# Patient Record
Sex: FEMALE | Race: WHITE | NOT HISPANIC OR LATINO
[De-identification: names, ages, dates, MRNs, and addresses within clinical notes are randomized per-mention and may not be internally consistent; named-entity substitution may affect disease eponyms.]

---

## 2017-01-05 ENCOUNTER — APPOINTMENT (OUTPATIENT)
Dept: PSYCHIATRY | Facility: CLINIC | Age: 62
End: 2017-01-05

## 2017-01-12 ENCOUNTER — APPOINTMENT (OUTPATIENT)
Dept: PSYCHIATRY | Facility: CLINIC | Age: 62
End: 2017-01-12

## 2017-01-19 ENCOUNTER — APPOINTMENT (OUTPATIENT)
Dept: PSYCHIATRY | Facility: CLINIC | Age: 62
End: 2017-01-19

## 2017-01-26 ENCOUNTER — APPOINTMENT (OUTPATIENT)
Dept: PSYCHIATRY | Facility: CLINIC | Age: 62
End: 2017-01-26

## 2017-02-01 ENCOUNTER — OUTPATIENT (OUTPATIENT)
Dept: OUTPATIENT SERVICES | Facility: HOSPITAL | Age: 62
LOS: 1 days | Discharge: HOME | End: 2017-02-01

## 2017-02-02 ENCOUNTER — APPOINTMENT (OUTPATIENT)
Dept: PSYCHIATRY | Facility: CLINIC | Age: 62
End: 2017-02-02

## 2017-02-09 ENCOUNTER — APPOINTMENT (OUTPATIENT)
Dept: PSYCHIATRY | Facility: CLINIC | Age: 62
End: 2017-02-09

## 2017-02-09 DIAGNOSIS — F60.3 BORDERLINE PERSONALITY DISORDER: ICD-10-CM

## 2017-02-13 ENCOUNTER — APPOINTMENT (OUTPATIENT)
Dept: PSYCHIATRY | Facility: CLINIC | Age: 62
End: 2017-02-13

## 2017-02-16 ENCOUNTER — APPOINTMENT (OUTPATIENT)
Dept: PSYCHIATRY | Facility: CLINIC | Age: 62
End: 2017-02-16

## 2017-02-23 ENCOUNTER — APPOINTMENT (OUTPATIENT)
Dept: PSYCHIATRY | Facility: CLINIC | Age: 62
End: 2017-02-23

## 2017-02-27 ENCOUNTER — APPOINTMENT (OUTPATIENT)
Dept: PSYCHIATRY | Facility: CLINIC | Age: 62
End: 2017-02-27

## 2017-03-02 ENCOUNTER — APPOINTMENT (OUTPATIENT)
Dept: PSYCHIATRY | Facility: CLINIC | Age: 62
End: 2017-03-02

## 2017-03-09 ENCOUNTER — APPOINTMENT (OUTPATIENT)
Dept: PSYCHIATRY | Facility: CLINIC | Age: 62
End: 2017-03-09

## 2017-03-13 ENCOUNTER — APPOINTMENT (OUTPATIENT)
Dept: PSYCHIATRY | Facility: CLINIC | Age: 62
End: 2017-03-13

## 2017-03-16 ENCOUNTER — APPOINTMENT (OUTPATIENT)
Dept: PSYCHIATRY | Facility: CLINIC | Age: 62
End: 2017-03-16

## 2017-03-23 ENCOUNTER — APPOINTMENT (OUTPATIENT)
Dept: PSYCHIATRY | Facility: CLINIC | Age: 62
End: 2017-03-23

## 2017-03-30 ENCOUNTER — APPOINTMENT (OUTPATIENT)
Dept: PSYCHIATRY | Facility: CLINIC | Age: 62
End: 2017-03-30

## 2017-04-06 ENCOUNTER — APPOINTMENT (OUTPATIENT)
Dept: PSYCHIATRY | Facility: CLINIC | Age: 62
End: 2017-04-06

## 2017-04-13 ENCOUNTER — APPOINTMENT (OUTPATIENT)
Dept: PSYCHIATRY | Facility: CLINIC | Age: 62
End: 2017-04-13

## 2017-04-17 ENCOUNTER — APPOINTMENT (OUTPATIENT)
Dept: PSYCHIATRY | Facility: CLINIC | Age: 62
End: 2017-04-17

## 2017-04-20 ENCOUNTER — APPOINTMENT (OUTPATIENT)
Dept: PSYCHIATRY | Facility: CLINIC | Age: 62
End: 2017-04-20

## 2017-04-27 ENCOUNTER — APPOINTMENT (OUTPATIENT)
Dept: PSYCHIATRY | Facility: CLINIC | Age: 62
End: 2017-04-27

## 2017-05-04 ENCOUNTER — APPOINTMENT (OUTPATIENT)
Dept: PSYCHIATRY | Facility: CLINIC | Age: 62
End: 2017-05-04

## 2017-05-11 ENCOUNTER — APPOINTMENT (OUTPATIENT)
Dept: PSYCHIATRY | Facility: CLINIC | Age: 62
End: 2017-05-11

## 2017-05-18 ENCOUNTER — APPOINTMENT (OUTPATIENT)
Dept: PSYCHIATRY | Facility: CLINIC | Age: 62
End: 2017-05-18

## 2017-05-25 ENCOUNTER — APPOINTMENT (OUTPATIENT)
Dept: PSYCHIATRY | Facility: CLINIC | Age: 62
End: 2017-05-25

## 2017-06-01 ENCOUNTER — APPOINTMENT (OUTPATIENT)
Dept: PSYCHIATRY | Facility: CLINIC | Age: 62
End: 2017-06-01

## 2017-06-05 ENCOUNTER — APPOINTMENT (OUTPATIENT)
Dept: PSYCHIATRY | Facility: CLINIC | Age: 62
End: 2017-06-05

## 2017-06-08 ENCOUNTER — APPOINTMENT (OUTPATIENT)
Dept: PSYCHIATRY | Facility: CLINIC | Age: 62
End: 2017-06-08

## 2017-06-15 ENCOUNTER — APPOINTMENT (OUTPATIENT)
Dept: PSYCHIATRY | Facility: CLINIC | Age: 62
End: 2017-06-15

## 2017-06-22 ENCOUNTER — APPOINTMENT (OUTPATIENT)
Dept: PSYCHIATRY | Facility: CLINIC | Age: 62
End: 2017-06-22

## 2017-06-29 ENCOUNTER — APPOINTMENT (OUTPATIENT)
Dept: PSYCHIATRY | Facility: CLINIC | Age: 62
End: 2017-06-29

## 2017-07-10 ENCOUNTER — APPOINTMENT (OUTPATIENT)
Dept: PSYCHIATRY | Facility: CLINIC | Age: 62
End: 2017-07-10
Payer: COMMERCIAL

## 2017-07-10 PROCEDURE — 99214 OFFICE O/P EST MOD 30 MIN: CPT

## 2017-07-13 ENCOUNTER — APPOINTMENT (OUTPATIENT)
Dept: PSYCHIATRY | Facility: CLINIC | Age: 62
End: 2017-07-13

## 2017-07-20 ENCOUNTER — APPOINTMENT (OUTPATIENT)
Dept: PSYCHIATRY | Facility: CLINIC | Age: 62
End: 2017-07-20

## 2017-07-27 ENCOUNTER — APPOINTMENT (OUTPATIENT)
Dept: PSYCHIATRY | Facility: CLINIC | Age: 62
End: 2017-07-27

## 2017-07-31 ENCOUNTER — APPOINTMENT (OUTPATIENT)
Dept: PSYCHIATRY | Facility: CLINIC | Age: 62
End: 2017-07-31

## 2017-08-01 ENCOUNTER — OUTPATIENT (OUTPATIENT)
Dept: OUTPATIENT SERVICES | Facility: HOSPITAL | Age: 62
LOS: 1 days | Discharge: HOME | End: 2017-08-01

## 2017-08-03 ENCOUNTER — APPOINTMENT (OUTPATIENT)
Dept: PSYCHIATRY | Facility: CLINIC | Age: 62
End: 2017-08-03

## 2017-08-07 ENCOUNTER — APPOINTMENT (OUTPATIENT)
Dept: PSYCHIATRY | Facility: CLINIC | Age: 62
End: 2017-08-07
Payer: COMMERCIAL

## 2017-08-07 PROCEDURE — 99214 OFFICE O/P EST MOD 30 MIN: CPT

## 2017-08-08 ENCOUNTER — APPOINTMENT (OUTPATIENT)
Dept: PSYCHIATRY | Facility: CLINIC | Age: 62
End: 2017-08-08

## 2017-08-08 DIAGNOSIS — F60.3 BORDERLINE PERSONALITY DISORDER: ICD-10-CM

## 2017-08-10 ENCOUNTER — APPOINTMENT (OUTPATIENT)
Dept: PSYCHIATRY | Facility: CLINIC | Age: 62
End: 2017-08-10

## 2017-08-14 ENCOUNTER — APPOINTMENT (OUTPATIENT)
Dept: PSYCHIATRY | Facility: CLINIC | Age: 62
End: 2017-08-14

## 2017-08-17 ENCOUNTER — APPOINTMENT (OUTPATIENT)
Dept: PSYCHIATRY | Facility: CLINIC | Age: 62
End: 2017-08-17

## 2017-08-21 ENCOUNTER — APPOINTMENT (OUTPATIENT)
Dept: PSYCHIATRY | Facility: CLINIC | Age: 62
End: 2017-08-21

## 2017-08-28 ENCOUNTER — APPOINTMENT (OUTPATIENT)
Dept: PSYCHIATRY | Facility: CLINIC | Age: 62
End: 2017-08-28

## 2017-09-05 ENCOUNTER — APPOINTMENT (OUTPATIENT)
Dept: PSYCHIATRY | Facility: CLINIC | Age: 62
End: 2017-09-05

## 2017-09-12 ENCOUNTER — APPOINTMENT (OUTPATIENT)
Dept: PSYCHIATRY | Facility: CLINIC | Age: 62
End: 2017-09-12

## 2017-09-18 ENCOUNTER — APPOINTMENT (OUTPATIENT)
Dept: PSYCHIATRY | Facility: CLINIC | Age: 62
End: 2017-09-18
Payer: COMMERCIAL

## 2017-09-18 PROCEDURE — 99214 OFFICE O/P EST MOD 30 MIN: CPT

## 2017-09-26 ENCOUNTER — APPOINTMENT (OUTPATIENT)
Dept: PSYCHIATRY | Facility: CLINIC | Age: 62
End: 2017-09-26

## 2017-10-03 ENCOUNTER — APPOINTMENT (OUTPATIENT)
Dept: PSYCHIATRY | Facility: CLINIC | Age: 62
End: 2017-10-03

## 2017-10-05 ENCOUNTER — APPOINTMENT (OUTPATIENT)
Dept: PSYCHIATRY | Facility: CLINIC | Age: 62
End: 2017-10-05

## 2017-10-10 ENCOUNTER — APPOINTMENT (OUTPATIENT)
Dept: PSYCHIATRY | Facility: CLINIC | Age: 62
End: 2017-10-10

## 2017-10-17 ENCOUNTER — APPOINTMENT (OUTPATIENT)
Dept: PSYCHIATRY | Facility: CLINIC | Age: 62
End: 2017-10-17

## 2017-10-24 ENCOUNTER — APPOINTMENT (OUTPATIENT)
Dept: PSYCHIATRY | Facility: CLINIC | Age: 62
End: 2017-10-24

## 2017-10-31 ENCOUNTER — APPOINTMENT (OUTPATIENT)
Dept: PSYCHIATRY | Facility: CLINIC | Age: 62
End: 2017-10-31

## 2017-11-07 ENCOUNTER — APPOINTMENT (OUTPATIENT)
Dept: PSYCHIATRY | Facility: CLINIC | Age: 62
End: 2017-11-07

## 2017-11-14 ENCOUNTER — APPOINTMENT (OUTPATIENT)
Dept: PSYCHIATRY | Facility: CLINIC | Age: 62
End: 2017-11-14

## 2017-11-21 ENCOUNTER — APPOINTMENT (OUTPATIENT)
Dept: PSYCHIATRY | Facility: CLINIC | Age: 62
End: 2017-11-21

## 2017-11-28 ENCOUNTER — APPOINTMENT (OUTPATIENT)
Dept: PSYCHIATRY | Facility: CLINIC | Age: 62
End: 2017-11-28

## 2017-12-05 ENCOUNTER — APPOINTMENT (OUTPATIENT)
Dept: PSYCHIATRY | Facility: CLINIC | Age: 62
End: 2017-12-05

## 2017-12-11 ENCOUNTER — APPOINTMENT (OUTPATIENT)
Dept: PSYCHIATRY | Facility: CLINIC | Age: 62
End: 2017-12-11
Payer: COMMERCIAL

## 2017-12-11 PROCEDURE — 99214 OFFICE O/P EST MOD 30 MIN: CPT

## 2017-12-12 ENCOUNTER — APPOINTMENT (OUTPATIENT)
Dept: PSYCHIATRY | Facility: CLINIC | Age: 62
End: 2017-12-12

## 2017-12-19 ENCOUNTER — APPOINTMENT (OUTPATIENT)
Dept: PSYCHIATRY | Facility: CLINIC | Age: 62
End: 2017-12-19

## 2017-12-26 ENCOUNTER — APPOINTMENT (OUTPATIENT)
Dept: PSYCHIATRY | Facility: CLINIC | Age: 62
End: 2017-12-26

## 2018-01-02 ENCOUNTER — APPOINTMENT (OUTPATIENT)
Dept: PSYCHIATRY | Facility: CLINIC | Age: 63
End: 2018-01-02

## 2018-01-08 ENCOUNTER — APPOINTMENT (OUTPATIENT)
Dept: PSYCHIATRY | Facility: CLINIC | Age: 63
End: 2018-01-08
Payer: COMMERCIAL

## 2018-01-08 PROCEDURE — 99214 OFFICE O/P EST MOD 30 MIN: CPT

## 2018-01-09 ENCOUNTER — APPOINTMENT (OUTPATIENT)
Dept: PSYCHIATRY | Facility: CLINIC | Age: 63
End: 2018-01-09

## 2018-01-16 ENCOUNTER — APPOINTMENT (OUTPATIENT)
Dept: PSYCHIATRY | Facility: CLINIC | Age: 63
End: 2018-01-16

## 2018-01-23 ENCOUNTER — APPOINTMENT (OUTPATIENT)
Dept: PSYCHIATRY | Facility: CLINIC | Age: 63
End: 2018-01-23

## 2018-01-29 ENCOUNTER — APPOINTMENT (OUTPATIENT)
Dept: PSYCHIATRY | Facility: CLINIC | Age: 63
End: 2018-01-29

## 2018-01-30 ENCOUNTER — APPOINTMENT (OUTPATIENT)
Dept: PSYCHIATRY | Facility: CLINIC | Age: 63
End: 2018-01-30

## 2018-02-01 ENCOUNTER — OUTPATIENT (OUTPATIENT)
Dept: OUTPATIENT SERVICES | Facility: HOSPITAL | Age: 63
LOS: 1 days | Discharge: HOME | End: 2018-02-01

## 2018-02-05 ENCOUNTER — APPOINTMENT (OUTPATIENT)
Dept: PSYCHIATRY | Facility: CLINIC | Age: 63
End: 2018-02-05

## 2018-02-06 ENCOUNTER — APPOINTMENT (OUTPATIENT)
Dept: PSYCHIATRY | Facility: CLINIC | Age: 63
End: 2018-02-06

## 2018-02-07 DIAGNOSIS — F60.3 BORDERLINE PERSONALITY DISORDER: ICD-10-CM

## 2018-02-12 ENCOUNTER — APPOINTMENT (OUTPATIENT)
Dept: PSYCHIATRY | Facility: CLINIC | Age: 63
End: 2018-02-12

## 2018-02-13 ENCOUNTER — APPOINTMENT (OUTPATIENT)
Dept: PSYCHIATRY | Facility: CLINIC | Age: 63
End: 2018-02-13

## 2018-02-20 ENCOUNTER — APPOINTMENT (OUTPATIENT)
Dept: PSYCHIATRY | Facility: CLINIC | Age: 63
End: 2018-02-20

## 2018-02-26 ENCOUNTER — APPOINTMENT (OUTPATIENT)
Dept: PSYCHIATRY | Facility: CLINIC | Age: 63
End: 2018-02-26

## 2018-02-27 ENCOUNTER — APPOINTMENT (OUTPATIENT)
Dept: PSYCHIATRY | Facility: CLINIC | Age: 63
End: 2018-02-27

## 2018-03-05 ENCOUNTER — APPOINTMENT (OUTPATIENT)
Dept: PSYCHIATRY | Facility: CLINIC | Age: 63
End: 2018-03-05

## 2018-03-06 ENCOUNTER — APPOINTMENT (OUTPATIENT)
Dept: PSYCHIATRY | Facility: CLINIC | Age: 63
End: 2018-03-06

## 2018-03-12 ENCOUNTER — APPOINTMENT (OUTPATIENT)
Dept: PSYCHIATRY | Facility: CLINIC | Age: 63
End: 2018-03-12

## 2018-03-13 ENCOUNTER — APPOINTMENT (OUTPATIENT)
Dept: PSYCHIATRY | Facility: CLINIC | Age: 63
End: 2018-03-13

## 2018-03-19 ENCOUNTER — APPOINTMENT (OUTPATIENT)
Dept: PSYCHIATRY | Facility: CLINIC | Age: 63
End: 2018-03-19

## 2018-03-19 ENCOUNTER — APPOINTMENT (OUTPATIENT)
Dept: PSYCHIATRY | Facility: CLINIC | Age: 63
End: 2018-03-19
Payer: COMMERCIAL

## 2018-03-19 PROCEDURE — 99214 OFFICE O/P EST MOD 30 MIN: CPT

## 2018-03-20 ENCOUNTER — APPOINTMENT (OUTPATIENT)
Dept: PSYCHIATRY | Facility: CLINIC | Age: 63
End: 2018-03-20

## 2018-03-29 ENCOUNTER — APPOINTMENT (OUTPATIENT)
Dept: PSYCHIATRY | Facility: CLINIC | Age: 63
End: 2018-03-29

## 2018-04-02 ENCOUNTER — APPOINTMENT (OUTPATIENT)
Dept: PSYCHIATRY | Facility: CLINIC | Age: 63
End: 2018-04-02

## 2018-04-09 ENCOUNTER — APPOINTMENT (OUTPATIENT)
Dept: PSYCHIATRY | Facility: CLINIC | Age: 63
End: 2018-04-09

## 2018-04-19 ENCOUNTER — APPOINTMENT (OUTPATIENT)
Dept: PSYCHIATRY | Facility: CLINIC | Age: 63
End: 2018-04-19

## 2018-04-23 ENCOUNTER — APPOINTMENT (OUTPATIENT)
Dept: PSYCHIATRY | Facility: CLINIC | Age: 63
End: 2018-04-23

## 2018-04-30 ENCOUNTER — APPOINTMENT (OUTPATIENT)
Dept: PSYCHIATRY | Facility: CLINIC | Age: 63
End: 2018-04-30

## 2018-05-03 ENCOUNTER — APPOINTMENT (OUTPATIENT)
Dept: PSYCHIATRY | Facility: CLINIC | Age: 63
End: 2018-05-03

## 2018-05-10 ENCOUNTER — APPOINTMENT (OUTPATIENT)
Dept: PSYCHIATRY | Facility: CLINIC | Age: 63
End: 2018-05-10

## 2018-05-14 ENCOUNTER — APPOINTMENT (OUTPATIENT)
Dept: PSYCHIATRY | Facility: CLINIC | Age: 63
End: 2018-05-14

## 2018-05-17 ENCOUNTER — APPOINTMENT (OUTPATIENT)
Dept: PSYCHIATRY | Facility: CLINIC | Age: 63
End: 2018-05-17

## 2018-05-21 ENCOUNTER — APPOINTMENT (OUTPATIENT)
Dept: PSYCHIATRY | Facility: CLINIC | Age: 63
End: 2018-05-21

## 2018-05-21 ENCOUNTER — APPOINTMENT (OUTPATIENT)
Dept: PSYCHIATRY | Facility: CLINIC | Age: 63
End: 2018-05-21
Payer: COMMERCIAL

## 2018-05-21 PROCEDURE — 99214 OFFICE O/P EST MOD 30 MIN: CPT

## 2018-05-24 ENCOUNTER — APPOINTMENT (OUTPATIENT)
Dept: PSYCHIATRY | Facility: CLINIC | Age: 63
End: 2018-05-24

## 2018-05-31 ENCOUNTER — APPOINTMENT (OUTPATIENT)
Dept: PSYCHIATRY | Facility: CLINIC | Age: 63
End: 2018-05-31

## 2018-06-04 ENCOUNTER — APPOINTMENT (OUTPATIENT)
Dept: PSYCHIATRY | Facility: CLINIC | Age: 63
End: 2018-06-04

## 2018-06-07 ENCOUNTER — APPOINTMENT (OUTPATIENT)
Dept: PSYCHIATRY | Facility: CLINIC | Age: 63
End: 2018-06-07

## 2018-06-11 ENCOUNTER — APPOINTMENT (OUTPATIENT)
Dept: PSYCHIATRY | Facility: CLINIC | Age: 63
End: 2018-06-11

## 2018-06-14 ENCOUNTER — APPOINTMENT (OUTPATIENT)
Dept: PSYCHIATRY | Facility: CLINIC | Age: 63
End: 2018-06-14

## 2018-06-18 ENCOUNTER — APPOINTMENT (OUTPATIENT)
Dept: PSYCHIATRY | Facility: CLINIC | Age: 63
End: 2018-06-18

## 2018-06-21 ENCOUNTER — APPOINTMENT (OUTPATIENT)
Dept: PSYCHIATRY | Facility: CLINIC | Age: 63
End: 2018-06-21

## 2018-06-25 ENCOUNTER — APPOINTMENT (OUTPATIENT)
Dept: PSYCHIATRY | Facility: CLINIC | Age: 63
End: 2018-06-25

## 2018-06-28 ENCOUNTER — APPOINTMENT (OUTPATIENT)
Dept: PSYCHIATRY | Facility: CLINIC | Age: 63
End: 2018-06-28

## 2018-07-02 ENCOUNTER — APPOINTMENT (OUTPATIENT)
Dept: PSYCHIATRY | Facility: CLINIC | Age: 63
End: 2018-07-02

## 2018-07-09 ENCOUNTER — APPOINTMENT (OUTPATIENT)
Dept: PSYCHIATRY | Facility: CLINIC | Age: 63
End: 2018-07-09

## 2018-07-16 ENCOUNTER — APPOINTMENT (OUTPATIENT)
Dept: PSYCHIATRY | Facility: CLINIC | Age: 63
End: 2018-07-16

## 2018-07-24 ENCOUNTER — APPOINTMENT (OUTPATIENT)
Dept: PSYCHIATRY | Facility: CLINIC | Age: 63
End: 2018-07-24
Payer: COMMERCIAL

## 2018-07-24 PROCEDURE — 99214 OFFICE O/P EST MOD 30 MIN: CPT

## 2018-07-25 ENCOUNTER — APPOINTMENT (OUTPATIENT)
Dept: PSYCHIATRY | Facility: CLINIC | Age: 63
End: 2018-07-25

## 2018-07-30 ENCOUNTER — APPOINTMENT (OUTPATIENT)
Dept: PSYCHIATRY | Facility: CLINIC | Age: 63
End: 2018-07-30

## 2018-08-07 ENCOUNTER — APPOINTMENT (OUTPATIENT)
Dept: PSYCHIATRY | Facility: CLINIC | Age: 63
End: 2018-08-07

## 2018-08-14 ENCOUNTER — APPOINTMENT (OUTPATIENT)
Dept: PSYCHIATRY | Facility: CLINIC | Age: 63
End: 2018-08-14

## 2018-08-15 ENCOUNTER — APPOINTMENT (OUTPATIENT)
Dept: PSYCHIATRY | Facility: CLINIC | Age: 63
End: 2018-08-15

## 2018-08-21 ENCOUNTER — APPOINTMENT (OUTPATIENT)
Dept: PSYCHIATRY | Facility: CLINIC | Age: 63
End: 2018-08-21

## 2018-08-28 ENCOUNTER — APPOINTMENT (OUTPATIENT)
Dept: PSYCHIATRY | Facility: CLINIC | Age: 63
End: 2018-08-28
Payer: COMMERCIAL

## 2018-08-28 PROCEDURE — 99214 OFFICE O/P EST MOD 30 MIN: CPT

## 2018-09-14 ENCOUNTER — APPOINTMENT (OUTPATIENT)
Dept: PSYCHIATRY | Facility: CLINIC | Age: 63
End: 2018-09-14

## 2018-09-21 ENCOUNTER — APPOINTMENT (OUTPATIENT)
Dept: PSYCHIATRY | Facility: CLINIC | Age: 63
End: 2018-09-21

## 2018-10-01 ENCOUNTER — APPOINTMENT (OUTPATIENT)
Dept: PSYCHIATRY | Facility: CLINIC | Age: 63
End: 2018-10-01
Payer: COMMERCIAL

## 2018-10-01 PROCEDURE — 99214 OFFICE O/P EST MOD 30 MIN: CPT

## 2018-10-19 ENCOUNTER — APPOINTMENT (OUTPATIENT)
Dept: PSYCHIATRY | Facility: CLINIC | Age: 63
End: 2018-10-19

## 2018-10-26 ENCOUNTER — APPOINTMENT (OUTPATIENT)
Dept: PSYCHIATRY | Facility: CLINIC | Age: 63
End: 2018-10-26

## 2018-10-29 ENCOUNTER — APPOINTMENT (OUTPATIENT)
Dept: PSYCHIATRY | Facility: CLINIC | Age: 63
End: 2018-10-29
Payer: COMMERCIAL

## 2018-10-29 PROCEDURE — 99214 OFFICE O/P EST MOD 30 MIN: CPT

## 2018-11-02 ENCOUNTER — APPOINTMENT (OUTPATIENT)
Dept: PSYCHIATRY | Facility: CLINIC | Age: 63
End: 2018-11-02

## 2018-11-09 ENCOUNTER — APPOINTMENT (OUTPATIENT)
Dept: PSYCHIATRY | Facility: CLINIC | Age: 63
End: 2018-11-09

## 2018-11-16 ENCOUNTER — APPOINTMENT (OUTPATIENT)
Dept: PSYCHIATRY | Facility: CLINIC | Age: 63
End: 2018-11-16

## 2018-11-23 ENCOUNTER — APPOINTMENT (OUTPATIENT)
Dept: PSYCHIATRY | Facility: CLINIC | Age: 63
End: 2018-11-23

## 2018-11-29 ENCOUNTER — APPOINTMENT (OUTPATIENT)
Dept: PSYCHIATRY | Facility: CLINIC | Age: 63
End: 2018-11-29

## 2018-11-30 ENCOUNTER — APPOINTMENT (OUTPATIENT)
Dept: PSYCHIATRY | Facility: CLINIC | Age: 63
End: 2018-11-30

## 2018-12-07 ENCOUNTER — APPOINTMENT (OUTPATIENT)
Dept: PSYCHIATRY | Facility: CLINIC | Age: 63
End: 2018-12-07

## 2018-12-14 ENCOUNTER — APPOINTMENT (OUTPATIENT)
Dept: PSYCHIATRY | Facility: CLINIC | Age: 63
End: 2018-12-14
Payer: MEDICARE

## 2018-12-14 ENCOUNTER — APPOINTMENT (OUTPATIENT)
Dept: PSYCHIATRY | Facility: CLINIC | Age: 63
End: 2018-12-14

## 2018-12-14 PROCEDURE — 99214 OFFICE O/P EST MOD 30 MIN: CPT

## 2018-12-21 ENCOUNTER — APPOINTMENT (OUTPATIENT)
Dept: PSYCHIATRY | Facility: CLINIC | Age: 63
End: 2018-12-21

## 2018-12-28 ENCOUNTER — APPOINTMENT (OUTPATIENT)
Dept: PSYCHIATRY | Facility: CLINIC | Age: 63
End: 2018-12-28

## 2019-01-04 ENCOUNTER — APPOINTMENT (OUTPATIENT)
Dept: PSYCHIATRY | Facility: CLINIC | Age: 64
End: 2019-01-04

## 2019-01-11 ENCOUNTER — APPOINTMENT (OUTPATIENT)
Dept: PSYCHIATRY | Facility: CLINIC | Age: 64
End: 2019-01-11

## 2019-01-11 ENCOUNTER — APPOINTMENT (OUTPATIENT)
Dept: PSYCHIATRY | Facility: CLINIC | Age: 64
End: 2019-01-11
Payer: MEDICARE

## 2019-01-11 PROCEDURE — 99214 OFFICE O/P EST MOD 30 MIN: CPT

## 2019-01-18 ENCOUNTER — APPOINTMENT (OUTPATIENT)
Dept: PSYCHIATRY | Facility: CLINIC | Age: 64
End: 2019-01-18

## 2019-01-25 ENCOUNTER — APPOINTMENT (OUTPATIENT)
Dept: PSYCHIATRY | Facility: CLINIC | Age: 64
End: 2019-01-25

## 2019-02-01 ENCOUNTER — OUTPATIENT (OUTPATIENT)
Dept: OUTPATIENT SERVICES | Facility: HOSPITAL | Age: 64
LOS: 1 days | End: 2019-02-01

## 2019-02-01 ENCOUNTER — APPOINTMENT (OUTPATIENT)
Dept: PSYCHIATRY | Facility: CLINIC | Age: 64
End: 2019-02-01

## 2019-02-08 ENCOUNTER — APPOINTMENT (OUTPATIENT)
Dept: PSYCHIATRY | Facility: CLINIC | Age: 64
End: 2019-02-08

## 2019-02-15 ENCOUNTER — APPOINTMENT (OUTPATIENT)
Dept: PSYCHIATRY | Facility: CLINIC | Age: 64
End: 2019-02-15

## 2019-02-22 ENCOUNTER — APPOINTMENT (OUTPATIENT)
Dept: PSYCHIATRY | Facility: CLINIC | Age: 64
End: 2019-02-22
Payer: MEDICARE

## 2019-02-22 ENCOUNTER — APPOINTMENT (OUTPATIENT)
Dept: PSYCHIATRY | Facility: CLINIC | Age: 64
End: 2019-02-22

## 2019-02-22 PROCEDURE — 99214 OFFICE O/P EST MOD 30 MIN: CPT

## 2019-03-01 ENCOUNTER — APPOINTMENT (OUTPATIENT)
Dept: PSYCHIATRY | Facility: CLINIC | Age: 64
End: 2019-03-01

## 2019-03-08 ENCOUNTER — APPOINTMENT (OUTPATIENT)
Dept: PSYCHIATRY | Facility: CLINIC | Age: 64
End: 2019-03-08

## 2019-03-15 ENCOUNTER — APPOINTMENT (OUTPATIENT)
Dept: PSYCHIATRY | Facility: CLINIC | Age: 64
End: 2019-03-15

## 2019-03-22 ENCOUNTER — APPOINTMENT (OUTPATIENT)
Dept: PSYCHIATRY | Facility: CLINIC | Age: 64
End: 2019-03-22
Payer: MEDICARE

## 2019-03-22 PROCEDURE — 99214 OFFICE O/P EST MOD 30 MIN: CPT

## 2019-03-29 ENCOUNTER — APPOINTMENT (OUTPATIENT)
Dept: PSYCHIATRY | Facility: CLINIC | Age: 64
End: 2019-03-29

## 2019-04-05 ENCOUNTER — APPOINTMENT (OUTPATIENT)
Dept: PSYCHIATRY | Facility: CLINIC | Age: 64
End: 2019-04-05

## 2019-04-09 ENCOUNTER — APPOINTMENT (OUTPATIENT)
Dept: PSYCHIATRY | Facility: CLINIC | Age: 64
End: 2019-04-09

## 2019-04-12 ENCOUNTER — APPOINTMENT (OUTPATIENT)
Dept: PSYCHIATRY | Facility: CLINIC | Age: 64
End: 2019-04-12

## 2019-04-16 ENCOUNTER — APPOINTMENT (OUTPATIENT)
Dept: PSYCHIATRY | Facility: CLINIC | Age: 64
End: 2019-04-16

## 2019-04-16 DIAGNOSIS — F60.3 BORDERLINE PERSONALITY DISORDER: ICD-10-CM

## 2019-04-19 ENCOUNTER — APPOINTMENT (OUTPATIENT)
Age: 64
End: 2019-04-19
Payer: MEDICARE

## 2019-04-19 PROCEDURE — 99214 OFFICE O/P EST MOD 30 MIN: CPT

## 2019-04-30 ENCOUNTER — APPOINTMENT (OUTPATIENT)
Dept: PSYCHIATRY | Facility: CLINIC | Age: 64
End: 2019-04-30

## 2019-05-03 ENCOUNTER — APPOINTMENT (OUTPATIENT)
Dept: PSYCHIATRY | Facility: CLINIC | Age: 64
End: 2019-05-03

## 2019-05-07 ENCOUNTER — APPOINTMENT (OUTPATIENT)
Dept: PSYCHIATRY | Facility: CLINIC | Age: 64
End: 2019-05-07

## 2019-05-10 ENCOUNTER — APPOINTMENT (OUTPATIENT)
Dept: PSYCHIATRY | Facility: CLINIC | Age: 64
End: 2019-05-10

## 2019-05-16 ENCOUNTER — APPOINTMENT (OUTPATIENT)
Dept: PSYCHIATRY | Facility: CLINIC | Age: 64
End: 2019-05-16

## 2019-05-24 ENCOUNTER — APPOINTMENT (OUTPATIENT)
Dept: PSYCHIATRY | Facility: CLINIC | Age: 64
End: 2019-05-24
Payer: MEDICARE

## 2019-05-24 PROCEDURE — 99214 OFFICE O/P EST MOD 30 MIN: CPT

## 2019-05-31 ENCOUNTER — APPOINTMENT (OUTPATIENT)
Dept: PSYCHIATRY | Facility: CLINIC | Age: 64
End: 2019-05-31

## 2019-06-07 ENCOUNTER — APPOINTMENT (OUTPATIENT)
Dept: PSYCHIATRY | Facility: CLINIC | Age: 64
End: 2019-06-07

## 2019-06-12 ENCOUNTER — APPOINTMENT (OUTPATIENT)
Dept: PSYCHIATRY | Facility: CLINIC | Age: 64
End: 2019-06-12

## 2019-06-21 ENCOUNTER — APPOINTMENT (OUTPATIENT)
Dept: PSYCHIATRY | Facility: CLINIC | Age: 64
End: 2019-06-21

## 2019-06-28 ENCOUNTER — APPOINTMENT (OUTPATIENT)
Dept: PSYCHIATRY | Facility: CLINIC | Age: 64
End: 2019-06-28

## 2019-07-09 ENCOUNTER — APPOINTMENT (OUTPATIENT)
Dept: PSYCHIATRY | Facility: CLINIC | Age: 64
End: 2019-07-09

## 2019-07-19 ENCOUNTER — APPOINTMENT (OUTPATIENT)
Dept: PSYCHIATRY | Facility: CLINIC | Age: 64
End: 2019-07-19
Payer: MEDICARE

## 2019-07-19 PROCEDURE — 99214 OFFICE O/P EST MOD 30 MIN: CPT

## 2019-07-25 ENCOUNTER — APPOINTMENT (OUTPATIENT)
Dept: PSYCHIATRY | Facility: CLINIC | Age: 64
End: 2019-07-25

## 2019-08-01 ENCOUNTER — OUTPATIENT (OUTPATIENT)
Dept: OUTPATIENT SERVICES | Facility: HOSPITAL | Age: 64
LOS: 1 days | Discharge: HOME | End: 2019-08-01

## 2019-08-01 DIAGNOSIS — F33.1 MAJOR DEPRESSIVE DISORDER, RECURRENT, MODERATE: ICD-10-CM

## 2019-08-02 ENCOUNTER — APPOINTMENT (OUTPATIENT)
Dept: PSYCHIATRY | Facility: CLINIC | Age: 64
End: 2019-08-02

## 2019-08-09 ENCOUNTER — APPOINTMENT (OUTPATIENT)
Dept: PSYCHIATRY | Facility: CLINIC | Age: 64
End: 2019-08-09

## 2019-08-15 ENCOUNTER — APPOINTMENT (OUTPATIENT)
Dept: PSYCHIATRY | Facility: CLINIC | Age: 64
End: 2019-08-15

## 2019-08-16 ENCOUNTER — APPOINTMENT (OUTPATIENT)
Dept: PSYCHIATRY | Facility: CLINIC | Age: 64
End: 2019-08-16

## 2019-08-22 ENCOUNTER — APPOINTMENT (OUTPATIENT)
Dept: PSYCHIATRY | Facility: CLINIC | Age: 64
End: 2019-08-22

## 2019-08-26 ENCOUNTER — APPOINTMENT (OUTPATIENT)
Dept: PSYCHIATRY | Facility: CLINIC | Age: 64
End: 2019-08-26
Payer: MEDICARE

## 2019-08-26 PROCEDURE — 99214 OFFICE O/P EST MOD 30 MIN: CPT

## 2019-08-29 ENCOUNTER — APPOINTMENT (OUTPATIENT)
Dept: PSYCHIATRY | Facility: CLINIC | Age: 64
End: 2019-08-29

## 2019-08-30 ENCOUNTER — APPOINTMENT (OUTPATIENT)
Dept: PSYCHIATRY | Facility: CLINIC | Age: 64
End: 2019-08-30

## 2019-09-05 ENCOUNTER — APPOINTMENT (OUTPATIENT)
Dept: PSYCHIATRY | Facility: CLINIC | Age: 64
End: 2019-09-05

## 2019-09-06 ENCOUNTER — APPOINTMENT (OUTPATIENT)
Dept: PSYCHIATRY | Facility: CLINIC | Age: 64
End: 2019-09-06

## 2019-09-12 ENCOUNTER — APPOINTMENT (OUTPATIENT)
Dept: PSYCHIATRY | Facility: CLINIC | Age: 64
End: 2019-09-12

## 2019-09-13 ENCOUNTER — APPOINTMENT (OUTPATIENT)
Dept: PSYCHIATRY | Facility: CLINIC | Age: 64
End: 2019-09-13

## 2019-09-20 ENCOUNTER — APPOINTMENT (OUTPATIENT)
Dept: PSYCHIATRY | Facility: CLINIC | Age: 64
End: 2019-09-20

## 2019-09-26 ENCOUNTER — APPOINTMENT (OUTPATIENT)
Dept: PSYCHIATRY | Facility: CLINIC | Age: 64
End: 2019-09-26
Payer: MEDICARE

## 2019-09-26 ENCOUNTER — APPOINTMENT (OUTPATIENT)
Dept: PSYCHIATRY | Facility: CLINIC | Age: 64
End: 2019-09-26

## 2019-09-26 PROCEDURE — 99214 OFFICE O/P EST MOD 30 MIN: CPT

## 2019-09-27 ENCOUNTER — APPOINTMENT (OUTPATIENT)
Dept: PSYCHIATRY | Facility: CLINIC | Age: 64
End: 2019-09-27

## 2019-10-03 ENCOUNTER — APPOINTMENT (OUTPATIENT)
Dept: PSYCHIATRY | Facility: CLINIC | Age: 64
End: 2019-10-03

## 2019-10-04 ENCOUNTER — APPOINTMENT (OUTPATIENT)
Dept: PSYCHIATRY | Facility: CLINIC | Age: 64
End: 2019-10-04

## 2019-10-10 ENCOUNTER — APPOINTMENT (OUTPATIENT)
Dept: PSYCHIATRY | Facility: CLINIC | Age: 64
End: 2019-10-10

## 2019-10-11 ENCOUNTER — APPOINTMENT (OUTPATIENT)
Dept: PSYCHIATRY | Facility: CLINIC | Age: 64
End: 2019-10-11

## 2019-10-17 ENCOUNTER — APPOINTMENT (OUTPATIENT)
Dept: PSYCHIATRY | Facility: CLINIC | Age: 64
End: 2019-10-17

## 2019-10-18 ENCOUNTER — APPOINTMENT (OUTPATIENT)
Dept: PSYCHIATRY | Facility: CLINIC | Age: 64
End: 2019-10-18

## 2019-10-24 ENCOUNTER — APPOINTMENT (OUTPATIENT)
Dept: PSYCHIATRY | Facility: CLINIC | Age: 64
End: 2019-10-24

## 2019-10-25 ENCOUNTER — APPOINTMENT (OUTPATIENT)
Dept: PSYCHIATRY | Facility: CLINIC | Age: 64
End: 2019-10-25

## 2019-10-31 ENCOUNTER — APPOINTMENT (OUTPATIENT)
Dept: PSYCHIATRY | Facility: CLINIC | Age: 64
End: 2019-10-31

## 2019-11-01 ENCOUNTER — APPOINTMENT (OUTPATIENT)
Dept: PSYCHIATRY | Facility: CLINIC | Age: 64
End: 2019-11-01

## 2019-11-07 ENCOUNTER — APPOINTMENT (OUTPATIENT)
Dept: PSYCHIATRY | Facility: CLINIC | Age: 64
End: 2019-11-07

## 2019-11-08 ENCOUNTER — APPOINTMENT (OUTPATIENT)
Dept: PSYCHIATRY | Facility: CLINIC | Age: 64
End: 2019-11-08

## 2019-11-14 ENCOUNTER — APPOINTMENT (OUTPATIENT)
Dept: PSYCHIATRY | Facility: CLINIC | Age: 64
End: 2019-11-14

## 2019-11-15 ENCOUNTER — APPOINTMENT (OUTPATIENT)
Dept: PSYCHIATRY | Facility: CLINIC | Age: 64
End: 2019-11-15

## 2019-11-18 ENCOUNTER — APPOINTMENT (OUTPATIENT)
Dept: PSYCHIATRY | Facility: CLINIC | Age: 64
End: 2019-11-18

## 2019-11-21 ENCOUNTER — APPOINTMENT (OUTPATIENT)
Dept: PSYCHIATRY | Facility: CLINIC | Age: 64
End: 2019-11-21

## 2019-11-22 ENCOUNTER — APPOINTMENT (OUTPATIENT)
Dept: PSYCHIATRY | Facility: CLINIC | Age: 64
End: 2019-11-22

## 2019-11-29 ENCOUNTER — APPOINTMENT (OUTPATIENT)
Dept: PSYCHIATRY | Facility: CLINIC | Age: 64
End: 2019-11-29

## 2019-12-02 ENCOUNTER — APPOINTMENT (OUTPATIENT)
Dept: PSYCHIATRY | Facility: CLINIC | Age: 64
End: 2019-12-02

## 2019-12-05 ENCOUNTER — APPOINTMENT (OUTPATIENT)
Dept: PSYCHIATRY | Facility: CLINIC | Age: 64
End: 2019-12-05

## 2019-12-06 ENCOUNTER — APPOINTMENT (OUTPATIENT)
Dept: PSYCHIATRY | Facility: CLINIC | Age: 64
End: 2019-12-06

## 2019-12-12 ENCOUNTER — APPOINTMENT (OUTPATIENT)
Dept: PSYCHIATRY | Facility: CLINIC | Age: 64
End: 2019-12-12

## 2019-12-12 ENCOUNTER — OUTPATIENT (OUTPATIENT)
Dept: OUTPATIENT SERVICES | Facility: HOSPITAL | Age: 64
LOS: 1 days | Discharge: HOME | End: 2019-12-12
Payer: MEDICARE

## 2019-12-13 ENCOUNTER — APPOINTMENT (OUTPATIENT)
Dept: PSYCHIATRY | Facility: CLINIC | Age: 64
End: 2019-12-13

## 2019-12-17 ENCOUNTER — APPOINTMENT (OUTPATIENT)
Dept: PSYCHIATRY | Facility: CLINIC | Age: 64
End: 2019-12-17

## 2019-12-19 ENCOUNTER — APPOINTMENT (OUTPATIENT)
Dept: PSYCHIATRY | Facility: CLINIC | Age: 64
End: 2019-12-19

## 2019-12-20 ENCOUNTER — APPOINTMENT (OUTPATIENT)
Dept: PSYCHIATRY | Facility: CLINIC | Age: 64
End: 2019-12-20

## 2019-12-26 ENCOUNTER — APPOINTMENT (OUTPATIENT)
Dept: PSYCHIATRY | Facility: CLINIC | Age: 64
End: 2019-12-26

## 2019-12-26 PROCEDURE — 99214 OFFICE O/P EST MOD 30 MIN: CPT

## 2020-01-02 ENCOUNTER — APPOINTMENT (OUTPATIENT)
Dept: PSYCHIATRY | Facility: CLINIC | Age: 65
End: 2020-01-02

## 2020-01-03 ENCOUNTER — FORM ENCOUNTER (OUTPATIENT)
Age: 65
End: 2020-01-03

## 2020-01-07 DIAGNOSIS — F33.1 MAJOR DEPRESSIVE DISORDER, RECURRENT, MODERATE: ICD-10-CM

## 2020-01-07 DIAGNOSIS — F60.9 PERSONALITY DISORDER, UNSPECIFIED: ICD-10-CM

## 2020-01-07 DIAGNOSIS — F50.2 BULIMIA NERVOSA: ICD-10-CM

## 2020-01-07 DIAGNOSIS — F41.0 PANIC DISORDER [EPISODIC PAROXYSMAL ANXIETY]: ICD-10-CM

## 2020-01-09 ENCOUNTER — APPOINTMENT (OUTPATIENT)
Dept: PSYCHIATRY | Facility: CLINIC | Age: 65
End: 2020-01-09

## 2020-01-17 ENCOUNTER — APPOINTMENT (OUTPATIENT)
Dept: PSYCHIATRY | Facility: CLINIC | Age: 65
End: 2020-01-17

## 2020-01-24 ENCOUNTER — APPOINTMENT (OUTPATIENT)
Dept: PSYCHIATRY | Facility: CLINIC | Age: 65
End: 2020-01-24

## 2020-01-30 ENCOUNTER — APPOINTMENT (OUTPATIENT)
Dept: PSYCHIATRY | Facility: CLINIC | Age: 65
End: 2020-01-30

## 2020-01-31 ENCOUNTER — APPOINTMENT (OUTPATIENT)
Dept: PSYCHIATRY | Facility: CLINIC | Age: 65
End: 2020-01-31

## 2020-02-06 ENCOUNTER — APPOINTMENT (OUTPATIENT)
Dept: PSYCHIATRY | Facility: CLINIC | Age: 65
End: 2020-02-06

## 2020-02-07 ENCOUNTER — APPOINTMENT (OUTPATIENT)
Dept: PSYCHIATRY | Facility: CLINIC | Age: 65
End: 2020-02-07

## 2020-02-13 ENCOUNTER — APPOINTMENT (OUTPATIENT)
Dept: PSYCHIATRY | Facility: CLINIC | Age: 65
End: 2020-02-13

## 2020-02-18 ENCOUNTER — APPOINTMENT (OUTPATIENT)
Dept: PSYCHIATRY | Facility: CLINIC | Age: 65
End: 2020-02-18

## 2020-02-18 PROCEDURE — 99214 OFFICE O/P EST MOD 30 MIN: CPT

## 2020-02-20 ENCOUNTER — APPOINTMENT (OUTPATIENT)
Dept: PSYCHIATRY | Facility: CLINIC | Age: 65
End: 2020-02-20

## 2020-02-27 ENCOUNTER — APPOINTMENT (OUTPATIENT)
Dept: PSYCHIATRY | Facility: CLINIC | Age: 65
End: 2020-02-27

## 2020-02-28 ENCOUNTER — APPOINTMENT (OUTPATIENT)
Dept: PSYCHIATRY | Facility: CLINIC | Age: 65
End: 2020-02-28

## 2020-03-05 ENCOUNTER — APPOINTMENT (OUTPATIENT)
Dept: PSYCHIATRY | Facility: CLINIC | Age: 65
End: 2020-03-05

## 2020-03-06 ENCOUNTER — APPOINTMENT (OUTPATIENT)
Dept: PSYCHIATRY | Facility: CLINIC | Age: 65
End: 2020-03-06

## 2020-03-12 ENCOUNTER — APPOINTMENT (OUTPATIENT)
Dept: PSYCHIATRY | Facility: CLINIC | Age: 65
End: 2020-03-12

## 2020-03-16 ENCOUNTER — FORM ENCOUNTER (OUTPATIENT)
Age: 65
End: 2020-03-16

## 2020-03-19 ENCOUNTER — APPOINTMENT (OUTPATIENT)
Dept: PSYCHIATRY | Facility: CLINIC | Age: 65
End: 2020-03-19

## 2020-03-19 ENCOUNTER — FORM ENCOUNTER (OUTPATIENT)
Age: 65
End: 2020-03-19

## 2020-03-20 ENCOUNTER — APPOINTMENT (OUTPATIENT)
Dept: PSYCHIATRY | Facility: CLINIC | Age: 65
End: 2020-03-20

## 2020-03-25 ENCOUNTER — FORM ENCOUNTER (OUTPATIENT)
Age: 65
End: 2020-03-25

## 2020-03-26 ENCOUNTER — APPOINTMENT (OUTPATIENT)
Dept: PSYCHIATRY | Facility: CLINIC | Age: 65
End: 2020-03-26

## 2020-03-26 ENCOUNTER — FORM ENCOUNTER (OUTPATIENT)
Age: 65
End: 2020-03-26

## 2020-03-26 PROCEDURE — 99214 OFFICE O/P EST MOD 30 MIN: CPT | Mod: 95

## 2020-03-27 ENCOUNTER — APPOINTMENT (OUTPATIENT)
Dept: PSYCHIATRY | Facility: CLINIC | Age: 65
End: 2020-03-27

## 2020-04-02 ENCOUNTER — APPOINTMENT (OUTPATIENT)
Dept: PSYCHIATRY | Facility: CLINIC | Age: 65
End: 2020-04-02

## 2020-04-02 ENCOUNTER — FORM ENCOUNTER (OUTPATIENT)
Age: 65
End: 2020-04-02

## 2020-04-03 ENCOUNTER — APPOINTMENT (OUTPATIENT)
Dept: PSYCHIATRY | Facility: CLINIC | Age: 65
End: 2020-04-03

## 2020-04-09 ENCOUNTER — FORM ENCOUNTER (OUTPATIENT)
Age: 65
End: 2020-04-09

## 2020-04-09 ENCOUNTER — APPOINTMENT (OUTPATIENT)
Dept: PSYCHIATRY | Facility: CLINIC | Age: 65
End: 2020-04-09

## 2020-04-10 ENCOUNTER — APPOINTMENT (OUTPATIENT)
Dept: PSYCHIATRY | Facility: CLINIC | Age: 65
End: 2020-04-10

## 2020-04-16 ENCOUNTER — FORM ENCOUNTER (OUTPATIENT)
Age: 65
End: 2020-04-16

## 2020-04-16 ENCOUNTER — APPOINTMENT (OUTPATIENT)
Dept: PSYCHIATRY | Facility: CLINIC | Age: 65
End: 2020-04-16

## 2020-04-17 ENCOUNTER — APPOINTMENT (OUTPATIENT)
Dept: PSYCHIATRY | Facility: CLINIC | Age: 65
End: 2020-04-17

## 2020-04-22 ENCOUNTER — FORM ENCOUNTER (OUTPATIENT)
Age: 65
End: 2020-04-22

## 2020-04-23 ENCOUNTER — APPOINTMENT (OUTPATIENT)
Dept: PSYCHIATRY | Facility: CLINIC | Age: 65
End: 2020-04-23

## 2020-04-23 ENCOUNTER — FORM ENCOUNTER (OUTPATIENT)
Age: 65
End: 2020-04-23

## 2020-04-23 PROCEDURE — 99214 OFFICE O/P EST MOD 30 MIN: CPT | Mod: 95

## 2020-04-24 ENCOUNTER — APPOINTMENT (OUTPATIENT)
Dept: PSYCHIATRY | Facility: CLINIC | Age: 65
End: 2020-04-24

## 2020-04-27 ENCOUNTER — FORM ENCOUNTER (OUTPATIENT)
Age: 65
End: 2020-04-27

## 2020-04-28 ENCOUNTER — APPOINTMENT (OUTPATIENT)
Dept: PSYCHIATRY | Facility: CLINIC | Age: 65
End: 2020-04-28

## 2020-04-30 ENCOUNTER — FORM ENCOUNTER (OUTPATIENT)
Age: 65
End: 2020-04-30

## 2020-04-30 ENCOUNTER — APPOINTMENT (OUTPATIENT)
Dept: PSYCHIATRY | Facility: CLINIC | Age: 65
End: 2020-04-30

## 2020-05-01 ENCOUNTER — OUTPATIENT (OUTPATIENT)
Dept: OUTPATIENT SERVICES | Facility: HOSPITAL | Age: 65
LOS: 1 days | Discharge: HOME | End: 2020-05-01
Payer: MEDICARE

## 2020-05-01 ENCOUNTER — APPOINTMENT (OUTPATIENT)
Dept: PSYCHIATRY | Facility: CLINIC | Age: 65
End: 2020-05-01

## 2020-05-04 ENCOUNTER — FORM ENCOUNTER (OUTPATIENT)
Age: 65
End: 2020-05-04

## 2020-05-05 ENCOUNTER — APPOINTMENT (OUTPATIENT)
Dept: PSYCHIATRY | Facility: CLINIC | Age: 65
End: 2020-05-05

## 2020-05-07 ENCOUNTER — APPOINTMENT (OUTPATIENT)
Dept: PSYCHIATRY | Facility: CLINIC | Age: 65
End: 2020-05-07

## 2020-05-07 ENCOUNTER — FORM ENCOUNTER (OUTPATIENT)
Age: 65
End: 2020-05-07

## 2020-05-08 ENCOUNTER — APPOINTMENT (OUTPATIENT)
Dept: PSYCHIATRY | Facility: CLINIC | Age: 65
End: 2020-05-08

## 2020-05-11 ENCOUNTER — FORM ENCOUNTER (OUTPATIENT)
Age: 65
End: 2020-05-11

## 2020-05-12 ENCOUNTER — APPOINTMENT (OUTPATIENT)
Dept: PSYCHIATRY | Facility: CLINIC | Age: 65
End: 2020-05-12

## 2020-05-14 ENCOUNTER — FORM ENCOUNTER (OUTPATIENT)
Age: 65
End: 2020-05-14

## 2020-05-14 ENCOUNTER — APPOINTMENT (OUTPATIENT)
Dept: PSYCHIATRY | Facility: CLINIC | Age: 65
End: 2020-05-14

## 2020-05-15 ENCOUNTER — APPOINTMENT (OUTPATIENT)
Dept: PSYCHIATRY | Facility: CLINIC | Age: 65
End: 2020-05-15

## 2020-05-18 ENCOUNTER — FORM ENCOUNTER (OUTPATIENT)
Age: 65
End: 2020-05-18

## 2020-05-19 ENCOUNTER — APPOINTMENT (OUTPATIENT)
Dept: PSYCHIATRY | Facility: CLINIC | Age: 65
End: 2020-05-19

## 2020-05-21 ENCOUNTER — FORM ENCOUNTER (OUTPATIENT)
Age: 65
End: 2020-05-21

## 2020-05-22 ENCOUNTER — APPOINTMENT (OUTPATIENT)
Dept: PSYCHIATRY | Facility: CLINIC | Age: 65
End: 2020-05-22

## 2020-05-22 DIAGNOSIS — F33.1 MAJOR DEPRESSIVE DISORDER, RECURRENT, MODERATE: ICD-10-CM

## 2020-05-25 ENCOUNTER — FORM ENCOUNTER (OUTPATIENT)
Age: 65
End: 2020-05-25

## 2020-05-26 ENCOUNTER — APPOINTMENT (OUTPATIENT)
Dept: PSYCHIATRY | Facility: CLINIC | Age: 65
End: 2020-05-26

## 2020-05-27 ENCOUNTER — FORM ENCOUNTER (OUTPATIENT)
Age: 65
End: 2020-05-27

## 2020-05-28 ENCOUNTER — APPOINTMENT (OUTPATIENT)
Dept: PSYCHIATRY | Facility: CLINIC | Age: 65
End: 2020-05-28

## 2020-05-28 PROCEDURE — 99443: CPT | Mod: 95

## 2020-05-29 ENCOUNTER — APPOINTMENT (OUTPATIENT)
Dept: PSYCHIATRY | Facility: CLINIC | Age: 65
End: 2020-05-29

## 2020-05-31 ENCOUNTER — FORM ENCOUNTER (OUTPATIENT)
Age: 65
End: 2020-05-31

## 2020-06-01 ENCOUNTER — FORM ENCOUNTER (OUTPATIENT)
Age: 65
End: 2020-06-01

## 2020-06-01 ENCOUNTER — APPOINTMENT (OUTPATIENT)
Dept: PSYCHIATRY | Facility: CLINIC | Age: 65
End: 2020-06-01

## 2020-06-02 ENCOUNTER — APPOINTMENT (OUTPATIENT)
Dept: PSYCHIATRY | Facility: CLINIC | Age: 65
End: 2020-06-02

## 2020-06-05 ENCOUNTER — APPOINTMENT (OUTPATIENT)
Dept: PSYCHIATRY | Facility: CLINIC | Age: 65
End: 2020-06-05

## 2020-06-07 ENCOUNTER — FORM ENCOUNTER (OUTPATIENT)
Age: 65
End: 2020-06-07

## 2020-06-08 ENCOUNTER — FORM ENCOUNTER (OUTPATIENT)
Age: 65
End: 2020-06-08

## 2020-06-08 ENCOUNTER — APPOINTMENT (OUTPATIENT)
Dept: PSYCHIATRY | Facility: CLINIC | Age: 65
End: 2020-06-08

## 2020-06-09 ENCOUNTER — APPOINTMENT (OUTPATIENT)
Dept: PSYCHIATRY | Facility: CLINIC | Age: 65
End: 2020-06-09

## 2020-06-12 ENCOUNTER — APPOINTMENT (OUTPATIENT)
Dept: PSYCHIATRY | Facility: CLINIC | Age: 65
End: 2020-06-12

## 2020-06-14 ENCOUNTER — FORM ENCOUNTER (OUTPATIENT)
Age: 65
End: 2020-06-14

## 2020-06-15 ENCOUNTER — APPOINTMENT (OUTPATIENT)
Dept: PSYCHIATRY | Facility: CLINIC | Age: 65
End: 2020-06-15

## 2020-06-15 ENCOUNTER — FORM ENCOUNTER (OUTPATIENT)
Age: 65
End: 2020-06-15

## 2020-06-16 ENCOUNTER — APPOINTMENT (OUTPATIENT)
Dept: PSYCHIATRY | Facility: CLINIC | Age: 65
End: 2020-06-16

## 2020-06-19 ENCOUNTER — APPOINTMENT (OUTPATIENT)
Dept: PSYCHIATRY | Facility: CLINIC | Age: 65
End: 2020-06-19

## 2020-06-21 ENCOUNTER — FORM ENCOUNTER (OUTPATIENT)
Age: 65
End: 2020-06-21

## 2020-06-22 ENCOUNTER — FORM ENCOUNTER (OUTPATIENT)
Age: 65
End: 2020-06-22

## 2020-06-22 ENCOUNTER — APPOINTMENT (OUTPATIENT)
Dept: PSYCHIATRY | Facility: CLINIC | Age: 65
End: 2020-06-22

## 2020-06-23 ENCOUNTER — APPOINTMENT (OUTPATIENT)
Dept: PSYCHIATRY | Facility: CLINIC | Age: 65
End: 2020-06-23

## 2020-06-26 ENCOUNTER — APPOINTMENT (OUTPATIENT)
Dept: PSYCHIATRY | Facility: CLINIC | Age: 65
End: 2020-06-26

## 2020-06-29 ENCOUNTER — APPOINTMENT (OUTPATIENT)
Dept: PSYCHIATRY | Facility: CLINIC | Age: 65
End: 2020-06-29

## 2020-06-29 ENCOUNTER — FORM ENCOUNTER (OUTPATIENT)
Age: 65
End: 2020-06-29

## 2020-06-30 ENCOUNTER — APPOINTMENT (OUTPATIENT)
Dept: PSYCHIATRY | Facility: CLINIC | Age: 65
End: 2020-06-30

## 2020-06-30 ENCOUNTER — FORM ENCOUNTER (OUTPATIENT)
Age: 65
End: 2020-06-30

## 2020-07-01 ENCOUNTER — APPOINTMENT (OUTPATIENT)
Dept: PSYCHIATRY | Facility: CLINIC | Age: 65
End: 2020-07-01

## 2020-07-01 PROCEDURE — 99214 OFFICE O/P EST MOD 30 MIN: CPT | Mod: 95

## 2020-07-05 ENCOUNTER — FORM ENCOUNTER (OUTPATIENT)
Age: 65
End: 2020-07-05

## 2020-07-06 ENCOUNTER — APPOINTMENT (OUTPATIENT)
Dept: PSYCHIATRY | Facility: CLINIC | Age: 65
End: 2020-07-06

## 2020-07-12 ENCOUNTER — FORM ENCOUNTER (OUTPATIENT)
Age: 65
End: 2020-07-12

## 2020-07-13 ENCOUNTER — APPOINTMENT (OUTPATIENT)
Dept: PSYCHIATRY | Facility: CLINIC | Age: 65
End: 2020-07-13

## 2020-07-19 ENCOUNTER — FORM ENCOUNTER (OUTPATIENT)
Age: 65
End: 2020-07-19

## 2020-07-20 ENCOUNTER — APPOINTMENT (OUTPATIENT)
Dept: PSYCHIATRY | Facility: CLINIC | Age: 65
End: 2020-07-20

## 2020-07-26 ENCOUNTER — FORM ENCOUNTER (OUTPATIENT)
Age: 65
End: 2020-07-26

## 2020-07-27 ENCOUNTER — APPOINTMENT (OUTPATIENT)
Dept: PSYCHIATRY | Facility: CLINIC | Age: 65
End: 2020-07-27

## 2020-08-02 ENCOUNTER — FORM ENCOUNTER (OUTPATIENT)
Age: 65
End: 2020-08-02

## 2020-08-03 ENCOUNTER — APPOINTMENT (OUTPATIENT)
Dept: PSYCHIATRY | Facility: CLINIC | Age: 65
End: 2020-08-03

## 2020-08-04 ENCOUNTER — FORM ENCOUNTER (OUTPATIENT)
Age: 65
End: 2020-08-04

## 2020-08-05 ENCOUNTER — APPOINTMENT (OUTPATIENT)
Dept: PSYCHIATRY | Facility: CLINIC | Age: 65
End: 2020-08-05

## 2020-08-23 ENCOUNTER — FORM ENCOUNTER (OUTPATIENT)
Age: 65
End: 2020-08-23

## 2020-08-24 ENCOUNTER — APPOINTMENT (OUTPATIENT)
Dept: PSYCHIATRY | Facility: CLINIC | Age: 65
End: 2020-08-24

## 2020-08-30 ENCOUNTER — FORM ENCOUNTER (OUTPATIENT)
Age: 65
End: 2020-08-30

## 2020-08-31 ENCOUNTER — APPOINTMENT (OUTPATIENT)
Dept: PSYCHIATRY | Facility: CLINIC | Age: 65
End: 2020-08-31

## 2020-09-01 ENCOUNTER — FORM ENCOUNTER (OUTPATIENT)
Age: 65
End: 2020-09-01

## 2020-09-02 ENCOUNTER — APPOINTMENT (OUTPATIENT)
Dept: PSYCHIATRY | Facility: CLINIC | Age: 65
End: 2020-09-02

## 2020-09-02 PROCEDURE — 99214 OFFICE O/P EST MOD 30 MIN: CPT | Mod: 95

## 2020-09-09 ENCOUNTER — FORM ENCOUNTER (OUTPATIENT)
Age: 65
End: 2020-09-09

## 2020-09-10 ENCOUNTER — APPOINTMENT (OUTPATIENT)
Dept: PSYCHIATRY | Facility: CLINIC | Age: 65
End: 2020-09-10

## 2020-09-13 ENCOUNTER — FORM ENCOUNTER (OUTPATIENT)
Age: 65
End: 2020-09-13

## 2020-09-14 ENCOUNTER — FORM ENCOUNTER (OUTPATIENT)
Age: 65
End: 2020-09-14

## 2020-09-14 ENCOUNTER — APPOINTMENT (OUTPATIENT)
Dept: PSYCHIATRY | Facility: CLINIC | Age: 65
End: 2020-09-14

## 2020-09-15 ENCOUNTER — APPOINTMENT (OUTPATIENT)
Dept: PSYCHIATRY | Facility: CLINIC | Age: 65
End: 2020-09-15

## 2020-09-16 ENCOUNTER — FORM ENCOUNTER (OUTPATIENT)
Age: 65
End: 2020-09-16

## 2020-09-17 ENCOUNTER — APPOINTMENT (OUTPATIENT)
Dept: PSYCHIATRY | Facility: CLINIC | Age: 65
End: 2020-09-17

## 2020-09-17 PROCEDURE — 90832 PSYTX W PT 30 MINUTES: CPT | Mod: 95

## 2020-09-20 ENCOUNTER — FORM ENCOUNTER (OUTPATIENT)
Age: 65
End: 2020-09-20

## 2020-09-21 ENCOUNTER — APPOINTMENT (OUTPATIENT)
Dept: PSYCHIATRY | Facility: CLINIC | Age: 65
End: 2020-09-21

## 2020-09-21 ENCOUNTER — FORM ENCOUNTER (OUTPATIENT)
Age: 65
End: 2020-09-21

## 2020-09-22 ENCOUNTER — APPOINTMENT (OUTPATIENT)
Dept: PSYCHIATRY | Facility: CLINIC | Age: 65
End: 2020-09-22

## 2020-09-28 ENCOUNTER — FORM ENCOUNTER (OUTPATIENT)
Age: 65
End: 2020-09-28

## 2020-09-28 ENCOUNTER — APPOINTMENT (OUTPATIENT)
Dept: PSYCHIATRY | Facility: CLINIC | Age: 65
End: 2020-09-28

## 2020-09-29 ENCOUNTER — APPOINTMENT (OUTPATIENT)
Dept: PSYCHIATRY | Facility: CLINIC | Age: 65
End: 2020-09-29

## 2020-10-04 ENCOUNTER — FORM ENCOUNTER (OUTPATIENT)
Age: 65
End: 2020-10-04

## 2020-10-05 ENCOUNTER — APPOINTMENT (OUTPATIENT)
Dept: PSYCHIATRY | Facility: CLINIC | Age: 65
End: 2020-10-05

## 2020-10-06 ENCOUNTER — FORM ENCOUNTER (OUTPATIENT)
Age: 65
End: 2020-10-06

## 2020-10-07 ENCOUNTER — FORM ENCOUNTER (OUTPATIENT)
Age: 65
End: 2020-10-07

## 2020-10-07 ENCOUNTER — APPOINTMENT (OUTPATIENT)
Dept: PSYCHIATRY | Facility: CLINIC | Age: 65
End: 2020-10-07

## 2020-10-08 ENCOUNTER — APPOINTMENT (OUTPATIENT)
Dept: PSYCHIATRY | Facility: CLINIC | Age: 65
End: 2020-10-08

## 2020-10-08 PROCEDURE — 99214 OFFICE O/P EST MOD 30 MIN: CPT | Mod: 95

## 2020-10-11 ENCOUNTER — FORM ENCOUNTER (OUTPATIENT)
Age: 65
End: 2020-10-11

## 2020-10-12 ENCOUNTER — APPOINTMENT (OUTPATIENT)
Dept: PSYCHIATRY | Facility: CLINIC | Age: 65
End: 2020-10-12

## 2020-10-13 ENCOUNTER — APPOINTMENT (OUTPATIENT)
Dept: PSYCHIATRY | Facility: CLINIC | Age: 65
End: 2020-10-13

## 2020-10-13 ENCOUNTER — FORM ENCOUNTER (OUTPATIENT)
Age: 65
End: 2020-10-13

## 2020-10-14 ENCOUNTER — APPOINTMENT (OUTPATIENT)
Dept: PSYCHIATRY | Facility: CLINIC | Age: 65
End: 2020-10-14

## 2020-10-19 ENCOUNTER — APPOINTMENT (OUTPATIENT)
Dept: PSYCHIATRY | Facility: CLINIC | Age: 65
End: 2020-10-19

## 2020-10-19 ENCOUNTER — FORM ENCOUNTER (OUTPATIENT)
Age: 65
End: 2020-10-19

## 2020-10-20 ENCOUNTER — APPOINTMENT (OUTPATIENT)
Dept: PSYCHIATRY | Facility: CLINIC | Age: 65
End: 2020-10-20
Payer: MEDICARE

## 2020-10-20 ENCOUNTER — APPOINTMENT (OUTPATIENT)
Dept: PSYCHIATRY | Facility: CLINIC | Age: 65
End: 2020-10-20

## 2020-10-20 PROCEDURE — 90834 PSYTX W PT 45 MINUTES: CPT | Mod: 95

## 2020-10-21 ENCOUNTER — APPOINTMENT (OUTPATIENT)
Dept: PSYCHIATRY | Facility: CLINIC | Age: 65
End: 2020-10-21

## 2020-10-26 ENCOUNTER — APPOINTMENT (OUTPATIENT)
Dept: PSYCHIATRY | Facility: CLINIC | Age: 65
End: 2020-10-26

## 2020-10-27 ENCOUNTER — APPOINTMENT (OUTPATIENT)
Dept: PSYCHIATRY | Facility: CLINIC | Age: 65
End: 2020-10-27

## 2020-10-28 ENCOUNTER — APPOINTMENT (OUTPATIENT)
Dept: PSYCHIATRY | Facility: CLINIC | Age: 65
End: 2020-10-28

## 2020-11-01 ENCOUNTER — FORM ENCOUNTER (OUTPATIENT)
Age: 65
End: 2020-11-01

## 2020-11-02 ENCOUNTER — OUTPATIENT (OUTPATIENT)
Dept: OUTPATIENT SERVICES | Facility: HOSPITAL | Age: 65
LOS: 1 days | Discharge: HOME | End: 2020-11-02
Payer: MEDICARE

## 2020-11-02 ENCOUNTER — APPOINTMENT (OUTPATIENT)
Dept: PSYCHIATRY | Facility: CLINIC | Age: 65
End: 2020-11-02

## 2020-11-03 ENCOUNTER — FORM ENCOUNTER (OUTPATIENT)
Age: 65
End: 2020-11-03

## 2020-11-03 ENCOUNTER — APPOINTMENT (OUTPATIENT)
Dept: PSYCHIATRY | Facility: CLINIC | Age: 65
End: 2020-11-03

## 2020-11-04 ENCOUNTER — FORM ENCOUNTER (OUTPATIENT)
Age: 65
End: 2020-11-04

## 2020-11-04 ENCOUNTER — APPOINTMENT (OUTPATIENT)
Dept: PSYCHIATRY | Facility: CLINIC | Age: 65
End: 2020-11-04
Payer: MEDICARE

## 2020-11-04 PROCEDURE — 90834 PSYTX W PT 45 MINUTES: CPT | Mod: 95

## 2020-11-05 ENCOUNTER — APPOINTMENT (OUTPATIENT)
Dept: PSYCHIATRY | Facility: CLINIC | Age: 65
End: 2020-11-05

## 2020-11-05 PROCEDURE — 99214 OFFICE O/P EST MOD 30 MIN: CPT | Mod: 95

## 2020-11-09 ENCOUNTER — APPOINTMENT (OUTPATIENT)
Dept: PSYCHIATRY | Facility: CLINIC | Age: 65
End: 2020-11-09

## 2020-11-10 ENCOUNTER — APPOINTMENT (OUTPATIENT)
Dept: PSYCHIATRY | Facility: CLINIC | Age: 65
End: 2020-11-10

## 2020-11-15 ENCOUNTER — FORM ENCOUNTER (OUTPATIENT)
Age: 65
End: 2020-11-15

## 2020-11-16 ENCOUNTER — FORM ENCOUNTER (OUTPATIENT)
Age: 65
End: 2020-11-16

## 2020-11-16 ENCOUNTER — APPOINTMENT (OUTPATIENT)
Dept: PSYCHIATRY | Facility: CLINIC | Age: 65
End: 2020-11-16

## 2020-11-17 ENCOUNTER — APPOINTMENT (OUTPATIENT)
Dept: PSYCHIATRY | Facility: CLINIC | Age: 65
End: 2020-11-17

## 2020-11-17 PROCEDURE — 90834 PSYTX W PT 45 MINUTES: CPT | Mod: 95

## 2020-11-22 ENCOUNTER — FORM ENCOUNTER (OUTPATIENT)
Age: 65
End: 2020-11-22

## 2020-11-23 ENCOUNTER — APPOINTMENT (OUTPATIENT)
Dept: PSYCHIATRY | Facility: CLINIC | Age: 65
End: 2020-11-23

## 2020-11-24 ENCOUNTER — APPOINTMENT (OUTPATIENT)
Dept: PSYCHIATRY | Facility: CLINIC | Age: 65
End: 2020-11-24

## 2020-11-24 ENCOUNTER — FORM ENCOUNTER (OUTPATIENT)
Age: 65
End: 2020-11-24

## 2020-11-25 ENCOUNTER — APPOINTMENT (OUTPATIENT)
Dept: PSYCHIATRY | Facility: CLINIC | Age: 65
End: 2020-11-25

## 2020-11-25 PROCEDURE — 90834 PSYTX W PT 45 MINUTES: CPT | Mod: 95

## 2020-11-29 ENCOUNTER — FORM ENCOUNTER (OUTPATIENT)
Age: 65
End: 2020-11-29

## 2020-11-30 ENCOUNTER — APPOINTMENT (OUTPATIENT)
Dept: PSYCHIATRY | Facility: CLINIC | Age: 65
End: 2020-11-30

## 2020-11-30 PROCEDURE — 90834 PSYTX W PT 45 MINUTES: CPT | Mod: 95

## 2020-12-01 ENCOUNTER — APPOINTMENT (OUTPATIENT)
Dept: PSYCHIATRY | Facility: CLINIC | Age: 65
End: 2020-12-01

## 2020-12-02 ENCOUNTER — FORM ENCOUNTER (OUTPATIENT)
Age: 65
End: 2020-12-02

## 2020-12-03 ENCOUNTER — APPOINTMENT (OUTPATIENT)
Dept: PSYCHIATRY | Facility: CLINIC | Age: 65
End: 2020-12-03

## 2020-12-03 PROCEDURE — 99214 OFFICE O/P EST MOD 30 MIN: CPT | Mod: 95

## 2020-12-06 ENCOUNTER — FORM ENCOUNTER (OUTPATIENT)
Age: 65
End: 2020-12-06

## 2020-12-07 ENCOUNTER — APPOINTMENT (OUTPATIENT)
Dept: PSYCHIATRY | Facility: CLINIC | Age: 65
End: 2020-12-07

## 2020-12-07 PROCEDURE — 90834 PSYTX W PT 45 MINUTES: CPT | Mod: 95

## 2020-12-08 ENCOUNTER — APPOINTMENT (OUTPATIENT)
Dept: PSYCHIATRY | Facility: CLINIC | Age: 65
End: 2020-12-08

## 2020-12-13 ENCOUNTER — FORM ENCOUNTER (OUTPATIENT)
Age: 65
End: 2020-12-13

## 2020-12-14 ENCOUNTER — APPOINTMENT (OUTPATIENT)
Dept: PSYCHIATRY | Facility: CLINIC | Age: 65
End: 2020-12-14

## 2020-12-14 PROCEDURE — 90834 PSYTX W PT 45 MINUTES: CPT | Mod: 95

## 2020-12-15 ENCOUNTER — APPOINTMENT (OUTPATIENT)
Dept: PSYCHIATRY | Facility: CLINIC | Age: 65
End: 2020-12-15

## 2020-12-15 DIAGNOSIS — F41.1 GENERALIZED ANXIETY DISORDER: ICD-10-CM

## 2020-12-20 ENCOUNTER — FORM ENCOUNTER (OUTPATIENT)
Age: 65
End: 2020-12-20

## 2020-12-21 ENCOUNTER — APPOINTMENT (OUTPATIENT)
Dept: PSYCHIATRY | Facility: CLINIC | Age: 65
End: 2020-12-21

## 2020-12-21 PROCEDURE — 90834 PSYTX W PT 45 MINUTES: CPT | Mod: 95

## 2020-12-22 ENCOUNTER — APPOINTMENT (OUTPATIENT)
Dept: PSYCHIATRY | Facility: CLINIC | Age: 65
End: 2020-12-22

## 2020-12-22 ENCOUNTER — FORM ENCOUNTER (OUTPATIENT)
Age: 65
End: 2020-12-22

## 2020-12-27 ENCOUNTER — FORM ENCOUNTER (OUTPATIENT)
Age: 65
End: 2020-12-27

## 2020-12-28 ENCOUNTER — APPOINTMENT (OUTPATIENT)
Dept: PSYCHIATRY | Facility: CLINIC | Age: 65
End: 2020-12-28

## 2020-12-28 PROCEDURE — 90834 PSYTX W PT 45 MINUTES: CPT | Mod: 95

## 2020-12-29 ENCOUNTER — FORM ENCOUNTER (OUTPATIENT)
Age: 65
End: 2020-12-29

## 2020-12-29 ENCOUNTER — APPOINTMENT (OUTPATIENT)
Dept: PSYCHIATRY | Facility: CLINIC | Age: 65
End: 2020-12-29

## 2020-12-30 ENCOUNTER — NON-APPOINTMENT (OUTPATIENT)
Age: 65
End: 2020-12-30

## 2020-12-30 DIAGNOSIS — M19.90 UNSPECIFIED OSTEOARTHRITIS, UNSPECIFIED SITE: ICD-10-CM

## 2020-12-30 DIAGNOSIS — Z78.9 OTHER SPECIFIED HEALTH STATUS: ICD-10-CM

## 2020-12-30 DIAGNOSIS — Z81.1 FAMILY HISTORY OF ALCOHOL ABUSE AND DEPENDENCE: ICD-10-CM

## 2020-12-30 DIAGNOSIS — Z87.81 PERSONAL HISTORY OF (HEALED) TRAUMATIC FRACTURE: ICD-10-CM

## 2021-01-03 ENCOUNTER — FORM ENCOUNTER (OUTPATIENT)
Age: 66
End: 2021-01-03

## 2021-01-04 ENCOUNTER — APPOINTMENT (OUTPATIENT)
Dept: PSYCHIATRY | Facility: CLINIC | Age: 66
End: 2021-01-04

## 2021-01-04 PROCEDURE — 90834 PSYTX W PT 45 MINUTES: CPT | Mod: 95

## 2021-01-04 PROCEDURE — 99214 OFFICE O/P EST MOD 30 MIN: CPT | Mod: 95

## 2021-01-07 ENCOUNTER — APPOINTMENT (OUTPATIENT)
Dept: PSYCHIATRY | Facility: CLINIC | Age: 66
End: 2021-01-07

## 2021-01-10 ENCOUNTER — FORM ENCOUNTER (OUTPATIENT)
Age: 66
End: 2021-01-10

## 2021-01-11 ENCOUNTER — APPOINTMENT (OUTPATIENT)
Dept: PSYCHIATRY | Facility: CLINIC | Age: 66
End: 2021-01-11

## 2021-01-11 PROCEDURE — 90834 PSYTX W PT 45 MINUTES: CPT | Mod: 95

## 2021-01-13 ENCOUNTER — FORM ENCOUNTER (OUTPATIENT)
Age: 66
End: 2021-01-13

## 2021-01-14 ENCOUNTER — APPOINTMENT (OUTPATIENT)
Dept: PSYCHIATRY | Facility: CLINIC | Age: 66
End: 2021-01-14

## 2021-01-14 PROCEDURE — 90853 GROUP PSYCHOTHERAPY: CPT | Mod: 95

## 2021-01-21 ENCOUNTER — APPOINTMENT (OUTPATIENT)
Dept: PSYCHIATRY | Facility: CLINIC | Age: 66
End: 2021-01-21
Payer: MEDICARE

## 2021-01-21 PROCEDURE — 90853 GROUP PSYCHOTHERAPY: CPT | Mod: 95

## 2021-01-25 ENCOUNTER — APPOINTMENT (OUTPATIENT)
Dept: PSYCHIATRY | Facility: CLINIC | Age: 66
End: 2021-01-25
Payer: MEDICARE

## 2021-01-25 PROCEDURE — 90834 PSYTX W PT 45 MINUTES: CPT | Mod: 95

## 2021-01-28 ENCOUNTER — APPOINTMENT (OUTPATIENT)
Dept: PSYCHIATRY | Facility: CLINIC | Age: 66
End: 2021-01-28
Payer: MEDICARE

## 2021-01-28 PROCEDURE — 90853 GROUP PSYCHOTHERAPY: CPT | Mod: 95

## 2021-02-01 ENCOUNTER — APPOINTMENT (OUTPATIENT)
Dept: PSYCHIATRY | Facility: CLINIC | Age: 66
End: 2021-02-01
Payer: MEDICARE

## 2021-02-01 DIAGNOSIS — Z87.898 PERSONAL HISTORY OF OTHER SPECIFIED CONDITIONS: ICD-10-CM

## 2021-02-01 DIAGNOSIS — F34.1 DYSTHYMIC DISORDER: ICD-10-CM

## 2021-02-01 PROCEDURE — 90833 PSYTX W PT W E/M 30 MIN: CPT | Mod: 95

## 2021-02-01 PROCEDURE — 99214 OFFICE O/P EST MOD 30 MIN: CPT | Mod: 95

## 2021-02-02 ENCOUNTER — TRANSCRIPTION ENCOUNTER (OUTPATIENT)
Age: 66
End: 2021-02-02

## 2021-02-03 ENCOUNTER — NON-APPOINTMENT (OUTPATIENT)
Age: 66
End: 2021-02-03

## 2021-02-04 ENCOUNTER — APPOINTMENT (OUTPATIENT)
Dept: PSYCHIATRY | Facility: CLINIC | Age: 66
End: 2021-02-04
Payer: MEDICARE

## 2021-02-04 PROCEDURE — 90853 GROUP PSYCHOTHERAPY: CPT | Mod: 95

## 2021-02-08 ENCOUNTER — APPOINTMENT (OUTPATIENT)
Dept: PSYCHIATRY | Facility: CLINIC | Age: 66
End: 2021-02-08
Payer: MEDICARE

## 2021-02-08 PROCEDURE — 90834 PSYTX W PT 45 MINUTES: CPT | Mod: 95

## 2021-02-11 ENCOUNTER — APPOINTMENT (OUTPATIENT)
Dept: PSYCHIATRY | Facility: CLINIC | Age: 66
End: 2021-02-11
Payer: MEDICARE

## 2021-02-11 PROCEDURE — 90853 GROUP PSYCHOTHERAPY: CPT | Mod: 95

## 2021-02-18 ENCOUNTER — APPOINTMENT (OUTPATIENT)
Dept: PSYCHIATRY | Facility: CLINIC | Age: 66
End: 2021-02-18
Payer: MEDICARE

## 2021-02-18 ENCOUNTER — NON-APPOINTMENT (OUTPATIENT)
Age: 66
End: 2021-02-18

## 2021-02-18 PROCEDURE — 90834 PSYTX W PT 45 MINUTES: CPT | Mod: 95

## 2021-02-18 PROCEDURE — 90853 GROUP PSYCHOTHERAPY: CPT | Mod: 95

## 2021-02-23 ENCOUNTER — NON-APPOINTMENT (OUTPATIENT)
Age: 66
End: 2021-02-23

## 2021-02-24 ENCOUNTER — NON-APPOINTMENT (OUTPATIENT)
Age: 66
End: 2021-02-24

## 2021-02-25 ENCOUNTER — APPOINTMENT (OUTPATIENT)
Dept: PSYCHIATRY | Facility: CLINIC | Age: 66
End: 2021-02-25
Payer: MEDICARE

## 2021-02-25 PROCEDURE — 90853 GROUP PSYCHOTHERAPY: CPT | Mod: 95

## 2021-02-25 PROCEDURE — 90834 PSYTX W PT 45 MINUTES: CPT | Mod: 95

## 2021-03-04 ENCOUNTER — APPOINTMENT (OUTPATIENT)
Dept: PSYCHIATRY | Facility: CLINIC | Age: 66
End: 2021-03-04
Payer: MEDICARE

## 2021-03-04 PROCEDURE — 90853 GROUP PSYCHOTHERAPY: CPT | Mod: 95

## 2021-03-04 PROCEDURE — 90847 FAMILY PSYTX W/PT 50 MIN: CPT | Mod: 95

## 2021-03-04 PROCEDURE — 90834 PSYTX W PT 45 MINUTES: CPT | Mod: 95

## 2021-03-10 ENCOUNTER — APPOINTMENT (OUTPATIENT)
Dept: PSYCHIATRY | Facility: CLINIC | Age: 66
End: 2021-03-10
Payer: MEDICARE

## 2021-03-10 PROCEDURE — 99214 OFFICE O/P EST MOD 30 MIN: CPT | Mod: 95

## 2021-03-11 ENCOUNTER — APPOINTMENT (OUTPATIENT)
Dept: PSYCHIATRY | Facility: CLINIC | Age: 66
End: 2021-03-11
Payer: MEDICARE

## 2021-03-11 PROCEDURE — 90853 GROUP PSYCHOTHERAPY: CPT | Mod: 95

## 2021-03-11 PROCEDURE — 90847 FAMILY PSYTX W/PT 50 MIN: CPT | Mod: 95

## 2021-03-11 PROCEDURE — 90834 PSYTX W PT 45 MINUTES: CPT | Mod: 95

## 2021-03-18 ENCOUNTER — APPOINTMENT (OUTPATIENT)
Dept: PSYCHIATRY | Facility: CLINIC | Age: 66
End: 2021-03-18
Payer: MEDICARE

## 2021-03-18 ENCOUNTER — APPOINTMENT (OUTPATIENT)
Dept: PSYCHIATRY | Facility: CLINIC | Age: 66
End: 2021-03-18

## 2021-03-18 PROCEDURE — 90853 GROUP PSYCHOTHERAPY: CPT | Mod: 95

## 2021-03-18 PROCEDURE — 90846 FAMILY PSYTX W/O PT 50 MIN: CPT | Mod: 95

## 2021-03-23 ENCOUNTER — APPOINTMENT (OUTPATIENT)
Dept: PSYCHIATRY | Facility: CLINIC | Age: 66
End: 2021-03-23

## 2021-03-25 ENCOUNTER — APPOINTMENT (OUTPATIENT)
Dept: PSYCHIATRY | Facility: CLINIC | Age: 66
End: 2021-03-25
Payer: MEDICARE

## 2021-03-25 PROCEDURE — 90853 GROUP PSYCHOTHERAPY: CPT | Mod: 95

## 2021-03-25 PROCEDURE — 90834 PSYTX W PT 45 MINUTES: CPT | Mod: 95

## 2021-03-30 ENCOUNTER — APPOINTMENT (OUTPATIENT)
Dept: PSYCHIATRY | Facility: CLINIC | Age: 66
End: 2021-03-30

## 2021-03-30 ENCOUNTER — APPOINTMENT (OUTPATIENT)
Dept: PSYCHIATRY | Facility: CLINIC | Age: 66
End: 2021-03-30
Payer: MEDICARE

## 2021-03-30 PROCEDURE — 90834 PSYTX W PT 45 MINUTES: CPT | Mod: 59,95

## 2021-03-30 PROCEDURE — 90834 PSYTX W PT 45 MINUTES: CPT | Mod: 95

## 2021-04-01 ENCOUNTER — APPOINTMENT (OUTPATIENT)
Dept: PSYCHIATRY | Facility: CLINIC | Age: 66
End: 2021-04-01

## 2021-04-06 ENCOUNTER — APPOINTMENT (OUTPATIENT)
Dept: PSYCHIATRY | Facility: CLINIC | Age: 66
End: 2021-04-06
Payer: MEDICARE

## 2021-04-06 PROCEDURE — 90834 PSYTX W PT 45 MINUTES: CPT | Mod: 95

## 2021-04-06 PROCEDURE — 99214 OFFICE O/P EST MOD 30 MIN: CPT | Mod: 95

## 2021-04-06 PROCEDURE — 90847 FAMILY PSYTX W/PT 50 MIN: CPT | Mod: 95

## 2021-04-08 ENCOUNTER — APPOINTMENT (OUTPATIENT)
Dept: PSYCHIATRY | Facility: CLINIC | Age: 66
End: 2021-04-08

## 2021-04-13 ENCOUNTER — APPOINTMENT (OUTPATIENT)
Dept: PSYCHIATRY | Facility: CLINIC | Age: 66
End: 2021-04-13
Payer: MEDICARE

## 2021-04-13 PROCEDURE — 90834 PSYTX W PT 45 MINUTES: CPT | Mod: 95

## 2021-04-13 PROCEDURE — 90847 FAMILY PSYTX W/PT 50 MIN: CPT | Mod: 95

## 2021-04-15 ENCOUNTER — APPOINTMENT (OUTPATIENT)
Dept: PSYCHIATRY | Facility: CLINIC | Age: 66
End: 2021-04-15

## 2021-04-20 ENCOUNTER — APPOINTMENT (OUTPATIENT)
Dept: PSYCHIATRY | Facility: CLINIC | Age: 66
End: 2021-04-20

## 2021-04-20 ENCOUNTER — APPOINTMENT (OUTPATIENT)
Dept: PSYCHIATRY | Facility: CLINIC | Age: 66
End: 2021-04-20
Payer: MEDICARE

## 2021-04-20 PROCEDURE — 90847 FAMILY PSYTX W/PT 50 MIN: CPT | Mod: 95

## 2021-04-22 ENCOUNTER — NON-APPOINTMENT (OUTPATIENT)
Age: 66
End: 2021-04-22

## 2021-04-22 ENCOUNTER — APPOINTMENT (OUTPATIENT)
Dept: PSYCHIATRY | Facility: CLINIC | Age: 66
End: 2021-04-22

## 2021-04-26 ENCOUNTER — APPOINTMENT (OUTPATIENT)
Dept: PSYCHIATRY | Facility: CLINIC | Age: 66
End: 2021-04-26
Payer: MEDICARE

## 2021-04-26 PROCEDURE — 90834 PSYTX W PT 45 MINUTES: CPT | Mod: 95

## 2021-04-27 ENCOUNTER — APPOINTMENT (OUTPATIENT)
Dept: PSYCHIATRY | Facility: CLINIC | Age: 66
End: 2021-04-27
Payer: MEDICARE

## 2021-04-27 ENCOUNTER — APPOINTMENT (OUTPATIENT)
Dept: PSYCHIATRY | Facility: CLINIC | Age: 66
End: 2021-04-27

## 2021-04-27 PROCEDURE — 90847 FAMILY PSYTX W/PT 50 MIN: CPT | Mod: 95

## 2021-04-29 ENCOUNTER — APPOINTMENT (OUTPATIENT)
Dept: PSYCHIATRY | Facility: CLINIC | Age: 66
End: 2021-04-29

## 2021-05-04 ENCOUNTER — APPOINTMENT (OUTPATIENT)
Dept: PSYCHIATRY | Facility: CLINIC | Age: 66
End: 2021-05-04

## 2021-05-05 ENCOUNTER — APPOINTMENT (OUTPATIENT)
Dept: PSYCHIATRY | Facility: CLINIC | Age: 66
End: 2021-05-05
Payer: MEDICARE

## 2021-05-05 PROCEDURE — 90834 PSYTX W PT 45 MINUTES: CPT | Mod: 95

## 2021-05-06 ENCOUNTER — APPOINTMENT (OUTPATIENT)
Dept: PSYCHIATRY | Facility: CLINIC | Age: 66
End: 2021-05-06

## 2021-05-11 ENCOUNTER — APPOINTMENT (OUTPATIENT)
Dept: PSYCHIATRY | Facility: CLINIC | Age: 66
End: 2021-05-11
Payer: MEDICARE

## 2021-05-11 PROCEDURE — 99214 OFFICE O/P EST MOD 30 MIN: CPT | Mod: 95

## 2021-05-11 PROCEDURE — 90834 PSYTX W PT 45 MINUTES: CPT | Mod: 95

## 2021-05-11 PROCEDURE — 90847 FAMILY PSYTX W/PT 50 MIN: CPT | Mod: 95

## 2021-05-13 ENCOUNTER — APPOINTMENT (OUTPATIENT)
Dept: PSYCHIATRY | Facility: CLINIC | Age: 66
End: 2021-05-13

## 2021-05-18 ENCOUNTER — APPOINTMENT (OUTPATIENT)
Dept: PSYCHIATRY | Facility: CLINIC | Age: 66
End: 2021-05-18
Payer: MEDICARE

## 2021-05-18 ENCOUNTER — NON-APPOINTMENT (OUTPATIENT)
Age: 66
End: 2021-05-18

## 2021-05-18 PROCEDURE — 90834 PSYTX W PT 45 MINUTES: CPT | Mod: 95

## 2021-05-20 ENCOUNTER — APPOINTMENT (OUTPATIENT)
Dept: PSYCHIATRY | Facility: CLINIC | Age: 66
End: 2021-05-20

## 2021-05-25 ENCOUNTER — APPOINTMENT (OUTPATIENT)
Dept: PSYCHIATRY | Facility: CLINIC | Age: 66
End: 2021-05-25

## 2021-05-27 ENCOUNTER — APPOINTMENT (OUTPATIENT)
Dept: PSYCHIATRY | Facility: CLINIC | Age: 66
End: 2021-05-27

## 2021-06-01 ENCOUNTER — APPOINTMENT (OUTPATIENT)
Dept: PSYCHIATRY | Facility: CLINIC | Age: 66
End: 2021-06-01
Payer: MEDICARE

## 2021-06-01 PROCEDURE — 90834 PSYTX W PT 45 MINUTES: CPT | Mod: 95

## 2021-06-03 ENCOUNTER — APPOINTMENT (OUTPATIENT)
Dept: PSYCHIATRY | Facility: CLINIC | Age: 66
End: 2021-06-03

## 2021-06-08 ENCOUNTER — APPOINTMENT (OUTPATIENT)
Dept: PSYCHIATRY | Facility: CLINIC | Age: 66
End: 2021-06-08
Payer: MEDICARE

## 2021-06-08 ENCOUNTER — APPOINTMENT (OUTPATIENT)
Dept: PSYCHIATRY | Facility: CLINIC | Age: 66
End: 2021-06-08

## 2021-06-08 PROCEDURE — 90847 FAMILY PSYTX W/PT 50 MIN: CPT | Mod: 95

## 2021-06-08 PROCEDURE — 90834 PSYTX W PT 45 MINUTES: CPT | Mod: 95

## 2021-06-10 ENCOUNTER — APPOINTMENT (OUTPATIENT)
Dept: PSYCHIATRY | Facility: CLINIC | Age: 66
End: 2021-06-10

## 2021-06-15 ENCOUNTER — APPOINTMENT (OUTPATIENT)
Dept: PSYCHIATRY | Facility: CLINIC | Age: 66
End: 2021-06-15
Payer: MEDICARE

## 2021-06-15 ENCOUNTER — APPOINTMENT (OUTPATIENT)
Dept: PSYCHIATRY | Facility: CLINIC | Age: 66
End: 2021-06-15

## 2021-06-15 PROCEDURE — 90834 PSYTX W PT 45 MINUTES: CPT | Mod: 95

## 2021-06-15 PROCEDURE — 90847 FAMILY PSYTX W/PT 50 MIN: CPT | Mod: 95

## 2021-06-17 ENCOUNTER — APPOINTMENT (OUTPATIENT)
Dept: PSYCHIATRY | Facility: CLINIC | Age: 66
End: 2021-06-17

## 2021-06-17 ENCOUNTER — APPOINTMENT (OUTPATIENT)
Dept: PSYCHIATRY | Facility: CLINIC | Age: 66
End: 2021-06-17
Payer: MEDICARE

## 2021-06-17 PROCEDURE — 99214 OFFICE O/P EST MOD 30 MIN: CPT | Mod: 95

## 2021-06-22 ENCOUNTER — APPOINTMENT (OUTPATIENT)
Dept: PSYCHIATRY | Facility: CLINIC | Age: 66
End: 2021-06-22
Payer: MEDICARE

## 2021-06-22 ENCOUNTER — APPOINTMENT (OUTPATIENT)
Dept: PSYCHIATRY | Facility: CLINIC | Age: 66
End: 2021-06-22

## 2021-06-22 PROCEDURE — 90847 FAMILY PSYTX W/PT 50 MIN: CPT | Mod: 95

## 2021-06-24 ENCOUNTER — APPOINTMENT (OUTPATIENT)
Dept: PSYCHIATRY | Facility: CLINIC | Age: 66
End: 2021-06-24

## 2021-06-29 ENCOUNTER — APPOINTMENT (OUTPATIENT)
Dept: PSYCHIATRY | Facility: CLINIC | Age: 66
End: 2021-06-29

## 2021-06-29 ENCOUNTER — APPOINTMENT (OUTPATIENT)
Dept: PSYCHIATRY | Facility: CLINIC | Age: 66
End: 2021-06-29
Payer: MEDICARE

## 2021-06-29 PROCEDURE — 90847 FAMILY PSYTX W/PT 50 MIN: CPT | Mod: 95

## 2021-06-29 PROCEDURE — 90834 PSYTX W PT 45 MINUTES: CPT | Mod: 95

## 2021-07-01 ENCOUNTER — APPOINTMENT (OUTPATIENT)
Dept: PSYCHIATRY | Facility: CLINIC | Age: 66
End: 2021-07-01

## 2021-07-06 ENCOUNTER — APPOINTMENT (OUTPATIENT)
Dept: PSYCHIATRY | Facility: CLINIC | Age: 66
End: 2021-07-06

## 2021-07-06 ENCOUNTER — APPOINTMENT (OUTPATIENT)
Dept: PSYCHIATRY | Facility: CLINIC | Age: 66
End: 2021-07-06
Payer: MEDICARE

## 2021-07-06 PROCEDURE — 90834 PSYTX W PT 45 MINUTES: CPT | Mod: 95

## 2021-07-08 ENCOUNTER — APPOINTMENT (OUTPATIENT)
Dept: PSYCHIATRY | Facility: CLINIC | Age: 66
End: 2021-07-08

## 2021-07-13 ENCOUNTER — APPOINTMENT (OUTPATIENT)
Dept: PSYCHIATRY | Facility: CLINIC | Age: 66
End: 2021-07-13
Payer: MEDICARE

## 2021-07-13 ENCOUNTER — APPOINTMENT (OUTPATIENT)
Dept: PSYCHIATRY | Facility: CLINIC | Age: 66
End: 2021-07-13

## 2021-07-13 PROCEDURE — 90847 FAMILY PSYTX W/PT 50 MIN: CPT | Mod: 95

## 2021-07-13 PROCEDURE — 90834 PSYTX W PT 45 MINUTES: CPT | Mod: 95

## 2021-07-13 PROCEDURE — 99214 OFFICE O/P EST MOD 30 MIN: CPT | Mod: 95

## 2021-07-15 ENCOUNTER — APPOINTMENT (OUTPATIENT)
Dept: PSYCHIATRY | Facility: CLINIC | Age: 66
End: 2021-07-15

## 2021-07-20 ENCOUNTER — APPOINTMENT (OUTPATIENT)
Dept: PSYCHIATRY | Facility: CLINIC | Age: 66
End: 2021-07-20
Payer: MEDICARE

## 2021-07-20 ENCOUNTER — APPOINTMENT (OUTPATIENT)
Dept: PSYCHIATRY | Facility: CLINIC | Age: 66
End: 2021-07-20

## 2021-07-20 PROCEDURE — 90847 FAMILY PSYTX W/PT 50 MIN: CPT | Mod: 95

## 2021-07-22 ENCOUNTER — APPOINTMENT (OUTPATIENT)
Dept: PSYCHIATRY | Facility: CLINIC | Age: 66
End: 2021-07-22

## 2021-07-23 ENCOUNTER — APPOINTMENT (OUTPATIENT)
Dept: PSYCHIATRY | Facility: CLINIC | Age: 66
End: 2021-07-23
Payer: MEDICARE

## 2021-07-23 ENCOUNTER — NON-APPOINTMENT (OUTPATIENT)
Age: 66
End: 2021-07-23

## 2021-07-23 PROCEDURE — 99215 OFFICE O/P EST HI 40 MIN: CPT | Mod: 95

## 2021-07-27 ENCOUNTER — APPOINTMENT (OUTPATIENT)
Dept: PSYCHIATRY | Facility: CLINIC | Age: 66
End: 2021-07-27
Payer: MEDICARE

## 2021-07-27 ENCOUNTER — APPOINTMENT (OUTPATIENT)
Dept: PSYCHIATRY | Facility: CLINIC | Age: 66
End: 2021-07-27

## 2021-07-27 PROCEDURE — 90834 PSYTX W PT 45 MINUTES: CPT | Mod: 95

## 2021-07-27 PROCEDURE — 90847 FAMILY PSYTX W/PT 50 MIN: CPT | Mod: 95

## 2021-07-29 ENCOUNTER — APPOINTMENT (OUTPATIENT)
Dept: PSYCHIATRY | Facility: CLINIC | Age: 66
End: 2021-07-29

## 2021-08-03 ENCOUNTER — APPOINTMENT (OUTPATIENT)
Dept: PSYCHIATRY | Facility: CLINIC | Age: 66
End: 2021-08-03
Payer: MEDICARE

## 2021-08-03 ENCOUNTER — APPOINTMENT (OUTPATIENT)
Dept: PSYCHIATRY | Facility: CLINIC | Age: 66
End: 2021-08-03

## 2021-08-03 PROCEDURE — 90847 FAMILY PSYTX W/PT 50 MIN: CPT | Mod: 95

## 2021-08-03 PROCEDURE — 90834 PSYTX W PT 45 MINUTES: CPT | Mod: 95

## 2021-08-10 ENCOUNTER — APPOINTMENT (OUTPATIENT)
Dept: PSYCHIATRY | Facility: CLINIC | Age: 66
End: 2021-08-10
Payer: MEDICARE

## 2021-08-10 ENCOUNTER — APPOINTMENT (OUTPATIENT)
Dept: PSYCHIATRY | Facility: CLINIC | Age: 66
End: 2021-08-10

## 2021-08-10 PROCEDURE — 99214 OFFICE O/P EST MOD 30 MIN: CPT | Mod: 95

## 2021-08-17 ENCOUNTER — APPOINTMENT (OUTPATIENT)
Dept: PSYCHIATRY | Facility: CLINIC | Age: 66
End: 2021-08-17
Payer: MEDICARE

## 2021-08-17 PROCEDURE — 90847 FAMILY PSYTX W/PT 50 MIN: CPT | Mod: 95

## 2021-08-17 PROCEDURE — 99214 OFFICE O/P EST MOD 30 MIN: CPT | Mod: 95

## 2021-08-24 ENCOUNTER — NON-APPOINTMENT (OUTPATIENT)
Age: 66
End: 2021-08-24

## 2021-08-24 ENCOUNTER — APPOINTMENT (OUTPATIENT)
Dept: PSYCHIATRY | Facility: CLINIC | Age: 66
End: 2021-08-24

## 2021-08-27 ENCOUNTER — NON-APPOINTMENT (OUTPATIENT)
Age: 66
End: 2021-08-27

## 2021-08-31 ENCOUNTER — APPOINTMENT (OUTPATIENT)
Dept: PSYCHIATRY | Facility: CLINIC | Age: 66
End: 2021-08-31
Payer: MEDICARE

## 2021-08-31 PROCEDURE — 90847 FAMILY PSYTX W/PT 50 MIN: CPT | Mod: 95

## 2021-09-02 ENCOUNTER — APPOINTMENT (OUTPATIENT)
Dept: PSYCHIATRY | Facility: CLINIC | Age: 66
End: 2021-09-02

## 2021-09-07 ENCOUNTER — APPOINTMENT (OUTPATIENT)
Dept: PSYCHIATRY | Facility: CLINIC | Age: 66
End: 2021-09-07
Payer: MEDICARE

## 2021-09-07 PROCEDURE — 90847 FAMILY PSYTX W/PT 50 MIN: CPT | Mod: 95

## 2021-09-09 ENCOUNTER — APPOINTMENT (OUTPATIENT)
Dept: PSYCHIATRY | Facility: CLINIC | Age: 66
End: 2021-09-09

## 2021-09-14 ENCOUNTER — APPOINTMENT (OUTPATIENT)
Dept: PSYCHIATRY | Facility: CLINIC | Age: 66
End: 2021-09-14
Payer: MEDICARE

## 2021-09-14 PROCEDURE — 99213 OFFICE O/P EST LOW 20 MIN: CPT | Mod: 95

## 2021-09-14 PROCEDURE — 90834 PSYTX W PT 45 MINUTES: CPT | Mod: 95

## 2021-09-16 ENCOUNTER — APPOINTMENT (OUTPATIENT)
Dept: PSYCHIATRY | Facility: CLINIC | Age: 66
End: 2021-09-16

## 2021-09-21 ENCOUNTER — APPOINTMENT (OUTPATIENT)
Dept: PSYCHIATRY | Facility: CLINIC | Age: 66
End: 2021-09-21
Payer: MEDICARE

## 2021-09-21 PROCEDURE — 90834 PSYTX W PT 45 MINUTES: CPT | Mod: 95

## 2021-09-23 ENCOUNTER — APPOINTMENT (OUTPATIENT)
Dept: PSYCHIATRY | Facility: CLINIC | Age: 66
End: 2021-09-23

## 2021-09-28 ENCOUNTER — APPOINTMENT (OUTPATIENT)
Dept: PSYCHIATRY | Facility: CLINIC | Age: 66
End: 2021-09-28

## 2021-09-28 ENCOUNTER — APPOINTMENT (OUTPATIENT)
Dept: PSYCHIATRY | Facility: CLINIC | Age: 66
End: 2021-09-28
Payer: MEDICARE

## 2021-09-28 PROCEDURE — 90847 FAMILY PSYTX W/PT 50 MIN: CPT | Mod: 95

## 2021-09-30 ENCOUNTER — APPOINTMENT (OUTPATIENT)
Dept: PSYCHIATRY | Facility: CLINIC | Age: 66
End: 2021-09-30

## 2021-10-05 ENCOUNTER — APPOINTMENT (OUTPATIENT)
Dept: PSYCHIATRY | Facility: CLINIC | Age: 66
End: 2021-10-05

## 2021-10-07 ENCOUNTER — APPOINTMENT (OUTPATIENT)
Dept: PSYCHIATRY | Facility: CLINIC | Age: 66
End: 2021-10-07

## 2021-10-14 ENCOUNTER — APPOINTMENT (OUTPATIENT)
Dept: PSYCHIATRY | Facility: CLINIC | Age: 66
End: 2021-10-14

## 2021-10-19 ENCOUNTER — APPOINTMENT (OUTPATIENT)
Dept: PSYCHIATRY | Facility: CLINIC | Age: 66
End: 2021-10-19
Payer: MEDICARE

## 2021-10-19 PROCEDURE — 99214 OFFICE O/P EST MOD 30 MIN: CPT | Mod: 95

## 2021-10-19 PROCEDURE — 90847 FAMILY PSYTX W/PT 50 MIN: CPT | Mod: 95

## 2021-10-19 RX ORDER — FLUOXETINE HYDROCHLORIDE 20 MG/1
20 CAPSULE ORAL
Qty: 30 | Refills: 1 | Status: DISCONTINUED | COMMUNITY
Start: 1900-01-01 | End: 2021-10-19

## 2021-10-19 RX ORDER — CLONAZEPAM 0.5 MG/1
0.5 TABLET ORAL
Qty: 30 | Refills: 0 | Status: DISCONTINUED | COMMUNITY
Start: 1900-01-01 | End: 2021-10-19

## 2021-10-21 ENCOUNTER — APPOINTMENT (OUTPATIENT)
Dept: PSYCHIATRY | Facility: CLINIC | Age: 66
End: 2021-10-21

## 2021-10-26 ENCOUNTER — APPOINTMENT (OUTPATIENT)
Dept: PSYCHIATRY | Facility: CLINIC | Age: 66
End: 2021-10-26

## 2021-10-26 ENCOUNTER — APPOINTMENT (OUTPATIENT)
Dept: PSYCHIATRY | Facility: CLINIC | Age: 66
End: 2021-10-26
Payer: MEDICARE

## 2021-10-26 PROCEDURE — 90834 PSYTX W PT 45 MINUTES: CPT | Mod: 95

## 2021-10-28 ENCOUNTER — APPOINTMENT (OUTPATIENT)
Dept: PSYCHIATRY | Facility: CLINIC | Age: 66
End: 2021-10-28

## 2021-11-02 ENCOUNTER — APPOINTMENT (OUTPATIENT)
Dept: PSYCHIATRY | Facility: CLINIC | Age: 66
End: 2021-11-02
Payer: MEDICARE

## 2021-11-02 ENCOUNTER — APPOINTMENT (OUTPATIENT)
Dept: PSYCHIATRY | Facility: CLINIC | Age: 66
End: 2021-11-02

## 2021-11-02 PROCEDURE — 90847 FAMILY PSYTX W/PT 50 MIN: CPT | Mod: 95

## 2021-11-04 ENCOUNTER — APPOINTMENT (OUTPATIENT)
Dept: PSYCHIATRY | Facility: CLINIC | Age: 66
End: 2021-11-04

## 2021-11-09 ENCOUNTER — APPOINTMENT (OUTPATIENT)
Dept: PSYCHIATRY | Facility: CLINIC | Age: 66
End: 2021-11-09

## 2021-11-09 ENCOUNTER — NON-APPOINTMENT (OUTPATIENT)
Age: 66
End: 2021-11-09

## 2021-11-11 ENCOUNTER — APPOINTMENT (OUTPATIENT)
Dept: PSYCHIATRY | Facility: CLINIC | Age: 66
End: 2021-11-11

## 2021-11-16 ENCOUNTER — APPOINTMENT (OUTPATIENT)
Dept: PSYCHIATRY | Facility: CLINIC | Age: 66
End: 2021-11-16

## 2021-11-16 ENCOUNTER — APPOINTMENT (OUTPATIENT)
Dept: PSYCHIATRY | Facility: CLINIC | Age: 66
End: 2021-11-16
Payer: MEDICARE

## 2021-11-16 PROCEDURE — 90847 FAMILY PSYTX W/PT 50 MIN: CPT | Mod: 95

## 2021-11-18 ENCOUNTER — APPOINTMENT (OUTPATIENT)
Dept: PSYCHIATRY | Facility: CLINIC | Age: 66
End: 2021-11-18

## 2021-11-23 ENCOUNTER — APPOINTMENT (OUTPATIENT)
Dept: PSYCHIATRY | Facility: CLINIC | Age: 66
End: 2021-11-23
Payer: MEDICARE

## 2021-11-23 ENCOUNTER — APPOINTMENT (OUTPATIENT)
Dept: PSYCHIATRY | Facility: CLINIC | Age: 66
End: 2021-11-23

## 2021-11-23 PROCEDURE — 90847 FAMILY PSYTX W/PT 50 MIN: CPT | Mod: 95

## 2021-11-30 ENCOUNTER — APPOINTMENT (OUTPATIENT)
Dept: PSYCHIATRY | Facility: CLINIC | Age: 66
End: 2021-11-30
Payer: MEDICARE

## 2021-11-30 ENCOUNTER — APPOINTMENT (OUTPATIENT)
Dept: PSYCHIATRY | Facility: CLINIC | Age: 66
End: 2021-11-30

## 2021-11-30 PROCEDURE — 99214 OFFICE O/P EST MOD 30 MIN: CPT | Mod: 95

## 2021-11-30 PROCEDURE — 90847 FAMILY PSYTX W/PT 50 MIN: CPT | Mod: 95

## 2021-11-30 RX ORDER — PROPRANOLOL HYDROCHLORIDE 20 MG/1
20 TABLET ORAL TWICE DAILY
Qty: 60 | Refills: 0 | Status: DISCONTINUED | COMMUNITY
Start: 2021-08-17 | End: 2021-11-30

## 2021-12-02 ENCOUNTER — APPOINTMENT (OUTPATIENT)
Dept: PSYCHIATRY | Facility: CLINIC | Age: 66
End: 2021-12-02

## 2021-12-07 ENCOUNTER — APPOINTMENT (OUTPATIENT)
Dept: PSYCHIATRY | Facility: CLINIC | Age: 66
End: 2021-12-07

## 2021-12-09 ENCOUNTER — APPOINTMENT (OUTPATIENT)
Dept: PSYCHIATRY | Facility: CLINIC | Age: 66
End: 2021-12-09

## 2021-12-14 ENCOUNTER — APPOINTMENT (OUTPATIENT)
Dept: PSYCHIATRY | Facility: CLINIC | Age: 66
End: 2021-12-14
Payer: MEDICARE

## 2021-12-14 ENCOUNTER — APPOINTMENT (OUTPATIENT)
Dept: PSYCHIATRY | Facility: CLINIC | Age: 66
End: 2021-12-14

## 2021-12-14 PROCEDURE — 90834 PSYTX W PT 45 MINUTES: CPT | Mod: 95

## 2021-12-16 ENCOUNTER — APPOINTMENT (OUTPATIENT)
Dept: PSYCHIATRY | Facility: CLINIC | Age: 66
End: 2021-12-16

## 2021-12-21 ENCOUNTER — APPOINTMENT (OUTPATIENT)
Dept: PSYCHIATRY | Facility: CLINIC | Age: 66
End: 2021-12-21

## 2021-12-23 ENCOUNTER — APPOINTMENT (OUTPATIENT)
Dept: PSYCHIATRY | Facility: CLINIC | Age: 66
End: 2021-12-23

## 2021-12-23 ENCOUNTER — NON-APPOINTMENT (OUTPATIENT)
Age: 66
End: 2021-12-23

## 2021-12-28 ENCOUNTER — APPOINTMENT (OUTPATIENT)
Dept: PSYCHIATRY | Facility: CLINIC | Age: 66
End: 2021-12-28
Payer: MEDICARE

## 2021-12-28 ENCOUNTER — APPOINTMENT (OUTPATIENT)
Dept: PSYCHIATRY | Facility: CLINIC | Age: 66
End: 2021-12-28

## 2021-12-28 PROCEDURE — 90847 FAMILY PSYTX W/PT 50 MIN: CPT | Mod: 95

## 2021-12-28 PROCEDURE — 99214 OFFICE O/P EST MOD 30 MIN: CPT | Mod: 95

## 2021-12-30 ENCOUNTER — APPOINTMENT (OUTPATIENT)
Dept: PSYCHIATRY | Facility: CLINIC | Age: 66
End: 2021-12-30

## 2022-01-04 ENCOUNTER — APPOINTMENT (OUTPATIENT)
Dept: PSYCHIATRY | Facility: CLINIC | Age: 67
End: 2022-01-04

## 2022-01-06 ENCOUNTER — APPOINTMENT (OUTPATIENT)
Dept: PSYCHIATRY | Facility: CLINIC | Age: 67
End: 2022-01-06

## 2022-01-11 ENCOUNTER — APPOINTMENT (OUTPATIENT)
Dept: PSYCHIATRY | Facility: CLINIC | Age: 67
End: 2022-01-11

## 2022-01-11 ENCOUNTER — APPOINTMENT (OUTPATIENT)
Dept: PSYCHIATRY | Facility: CLINIC | Age: 67
End: 2022-01-11
Payer: MEDICARE

## 2022-01-11 PROCEDURE — 90847 FAMILY PSYTX W/PT 50 MIN: CPT | Mod: 95

## 2022-01-13 ENCOUNTER — APPOINTMENT (OUTPATIENT)
Dept: PSYCHIATRY | Facility: CLINIC | Age: 67
End: 2022-01-13

## 2022-01-14 ENCOUNTER — NON-APPOINTMENT (OUTPATIENT)
Age: 67
End: 2022-01-14

## 2022-01-18 ENCOUNTER — APPOINTMENT (OUTPATIENT)
Dept: PSYCHIATRY | Facility: CLINIC | Age: 67
End: 2022-01-18
Payer: MEDICARE

## 2022-01-18 ENCOUNTER — APPOINTMENT (OUTPATIENT)
Dept: PSYCHIATRY | Facility: CLINIC | Age: 67
End: 2022-01-18

## 2022-01-18 PROCEDURE — 90847 FAMILY PSYTX W/PT 50 MIN: CPT | Mod: 95

## 2022-01-20 ENCOUNTER — APPOINTMENT (OUTPATIENT)
Dept: PSYCHIATRY | Facility: CLINIC | Age: 67
End: 2022-01-20

## 2022-01-21 ENCOUNTER — APPOINTMENT (OUTPATIENT)
Dept: PSYCHIATRY | Facility: CLINIC | Age: 67
End: 2022-01-21

## 2022-01-21 ENCOUNTER — NON-APPOINTMENT (OUTPATIENT)
Age: 67
End: 2022-01-21

## 2022-01-25 ENCOUNTER — APPOINTMENT (OUTPATIENT)
Dept: PSYCHIATRY | Facility: CLINIC | Age: 67
End: 2022-01-25
Payer: MEDICARE

## 2022-01-25 ENCOUNTER — APPOINTMENT (OUTPATIENT)
Dept: PSYCHIATRY | Facility: CLINIC | Age: 67
End: 2022-01-25

## 2022-01-25 PROCEDURE — 90834 PSYTX W PT 45 MINUTES: CPT | Mod: 95

## 2022-01-27 ENCOUNTER — APPOINTMENT (OUTPATIENT)
Dept: PSYCHIATRY | Facility: CLINIC | Age: 67
End: 2022-01-27

## 2022-01-28 ENCOUNTER — APPOINTMENT (OUTPATIENT)
Dept: PSYCHIATRY | Facility: CLINIC | Age: 67
End: 2022-01-28
Payer: MEDICARE

## 2022-01-28 PROCEDURE — 99214 OFFICE O/P EST MOD 30 MIN: CPT | Mod: 95

## 2022-01-28 RX ORDER — METHYLPHENIDATE HYDROCHLORIDE 18 MG/1
18 TABLET, EXTENDED RELEASE ORAL
Qty: 30 | Refills: 0 | Status: DISCONTINUED | COMMUNITY
Start: 1900-01-01 | End: 2022-01-28

## 2022-02-01 ENCOUNTER — APPOINTMENT (OUTPATIENT)
Dept: PSYCHIATRY | Facility: CLINIC | Age: 67
End: 2022-02-01
Payer: MEDICARE

## 2022-02-01 PROCEDURE — 90847 FAMILY PSYTX W/PT 50 MIN: CPT | Mod: 95

## 2022-02-03 ENCOUNTER — APPOINTMENT (OUTPATIENT)
Dept: PSYCHIATRY | Facility: CLINIC | Age: 67
End: 2022-02-03

## 2022-02-04 ENCOUNTER — APPOINTMENT (OUTPATIENT)
Dept: PSYCHIATRY | Facility: CLINIC | Age: 67
End: 2022-02-04

## 2022-02-08 ENCOUNTER — APPOINTMENT (OUTPATIENT)
Dept: PSYCHIATRY | Facility: CLINIC | Age: 67
End: 2022-02-08

## 2022-02-11 ENCOUNTER — APPOINTMENT (OUTPATIENT)
Dept: PSYCHIATRY | Facility: CLINIC | Age: 67
End: 2022-02-11

## 2022-02-15 ENCOUNTER — APPOINTMENT (OUTPATIENT)
Dept: PSYCHIATRY | Facility: CLINIC | Age: 67
End: 2022-02-15

## 2022-02-17 ENCOUNTER — APPOINTMENT (OUTPATIENT)
Dept: PSYCHIATRY | Facility: CLINIC | Age: 67
End: 2022-02-17
Payer: MEDICARE

## 2022-02-17 PROCEDURE — 90847 FAMILY PSYTX W/PT 50 MIN: CPT | Mod: 95

## 2022-02-18 ENCOUNTER — APPOINTMENT (OUTPATIENT)
Dept: PSYCHIATRY | Facility: CLINIC | Age: 67
End: 2022-02-18

## 2022-02-22 ENCOUNTER — APPOINTMENT (OUTPATIENT)
Dept: PSYCHIATRY | Facility: CLINIC | Age: 67
End: 2022-02-22

## 2022-02-23 ENCOUNTER — NON-APPOINTMENT (OUTPATIENT)
Age: 67
End: 2022-02-23

## 2022-02-25 ENCOUNTER — APPOINTMENT (OUTPATIENT)
Dept: PSYCHIATRY | Facility: CLINIC | Age: 67
End: 2022-02-25

## 2022-03-01 ENCOUNTER — APPOINTMENT (OUTPATIENT)
Dept: PSYCHIATRY | Facility: CLINIC | Age: 67
End: 2022-03-01
Payer: MEDICARE

## 2022-03-01 ENCOUNTER — APPOINTMENT (OUTPATIENT)
Dept: PSYCHIATRY | Facility: CLINIC | Age: 67
End: 2022-03-01

## 2022-03-01 PROCEDURE — 99214 OFFICE O/P EST MOD 30 MIN: CPT | Mod: 95

## 2022-03-01 PROCEDURE — 90847 FAMILY PSYTX W/PT 50 MIN: CPT | Mod: 95

## 2022-03-04 ENCOUNTER — APPOINTMENT (OUTPATIENT)
Dept: PSYCHIATRY | Facility: CLINIC | Age: 67
End: 2022-03-04

## 2022-03-08 ENCOUNTER — APPOINTMENT (OUTPATIENT)
Dept: PSYCHIATRY | Facility: CLINIC | Age: 67
End: 2022-03-08
Payer: MEDICARE

## 2022-03-08 PROCEDURE — 90847 FAMILY PSYTX W/PT 50 MIN: CPT | Mod: 95

## 2022-03-11 ENCOUNTER — APPOINTMENT (OUTPATIENT)
Dept: PSYCHIATRY | Facility: CLINIC | Age: 67
End: 2022-03-11

## 2022-03-11 ENCOUNTER — NON-APPOINTMENT (OUTPATIENT)
Age: 67
End: 2022-03-11

## 2022-03-15 ENCOUNTER — APPOINTMENT (OUTPATIENT)
Dept: PSYCHIATRY | Facility: CLINIC | Age: 67
End: 2022-03-15
Payer: MEDICARE

## 2022-03-15 PROCEDURE — 90847 FAMILY PSYTX W/PT 50 MIN: CPT | Mod: 95

## 2022-03-18 ENCOUNTER — APPOINTMENT (OUTPATIENT)
Dept: PSYCHIATRY | Facility: CLINIC | Age: 67
End: 2022-03-18

## 2022-03-18 ENCOUNTER — NON-APPOINTMENT (OUTPATIENT)
Age: 67
End: 2022-03-18

## 2022-03-22 ENCOUNTER — APPOINTMENT (OUTPATIENT)
Dept: PSYCHIATRY | Facility: CLINIC | Age: 67
End: 2022-03-22
Payer: MEDICARE

## 2022-03-22 PROCEDURE — 90847 FAMILY PSYTX W/PT 50 MIN: CPT | Mod: 95

## 2022-03-24 ENCOUNTER — NON-APPOINTMENT (OUTPATIENT)
Age: 67
End: 2022-03-24

## 2022-03-25 ENCOUNTER — APPOINTMENT (OUTPATIENT)
Dept: PSYCHIATRY | Facility: CLINIC | Age: 67
End: 2022-03-25

## 2022-03-29 ENCOUNTER — APPOINTMENT (OUTPATIENT)
Dept: PSYCHIATRY | Facility: CLINIC | Age: 67
End: 2022-03-29
Payer: MEDICARE

## 2022-03-29 PROCEDURE — 99214 OFFICE O/P EST MOD 30 MIN: CPT | Mod: 95

## 2022-03-29 PROCEDURE — 90847 FAMILY PSYTX W/PT 50 MIN: CPT | Mod: 95

## 2022-03-31 ENCOUNTER — APPOINTMENT (OUTPATIENT)
Dept: PSYCHIATRY | Facility: CLINIC | Age: 67
End: 2022-03-31

## 2022-04-01 ENCOUNTER — APPOINTMENT (OUTPATIENT)
Dept: PSYCHIATRY | Facility: CLINIC | Age: 67
End: 2022-04-01

## 2022-04-05 ENCOUNTER — APPOINTMENT (OUTPATIENT)
Dept: PSYCHIATRY | Facility: CLINIC | Age: 67
End: 2022-04-05
Payer: MEDICARE

## 2022-04-05 PROCEDURE — 90834 PSYTX W PT 45 MINUTES: CPT | Mod: 95

## 2022-04-07 ENCOUNTER — APPOINTMENT (OUTPATIENT)
Dept: PSYCHIATRY | Facility: CLINIC | Age: 67
End: 2022-04-07

## 2022-04-08 ENCOUNTER — APPOINTMENT (OUTPATIENT)
Dept: PSYCHIATRY | Facility: CLINIC | Age: 67
End: 2022-04-08

## 2022-04-12 ENCOUNTER — APPOINTMENT (OUTPATIENT)
Dept: PSYCHIATRY | Facility: CLINIC | Age: 67
End: 2022-04-12
Payer: MEDICARE

## 2022-04-12 PROCEDURE — 90847 FAMILY PSYTX W/PT 50 MIN: CPT | Mod: 95

## 2022-04-14 ENCOUNTER — APPOINTMENT (OUTPATIENT)
Dept: PSYCHIATRY | Facility: CLINIC | Age: 67
End: 2022-04-14

## 2022-04-15 ENCOUNTER — APPOINTMENT (OUTPATIENT)
Dept: PSYCHIATRY | Facility: CLINIC | Age: 67
End: 2022-04-15

## 2022-04-19 ENCOUNTER — APPOINTMENT (OUTPATIENT)
Dept: PSYCHIATRY | Facility: CLINIC | Age: 67
End: 2022-04-19

## 2022-04-22 ENCOUNTER — APPOINTMENT (OUTPATIENT)
Dept: PSYCHIATRY | Facility: CLINIC | Age: 67
End: 2022-04-22

## 2022-04-26 ENCOUNTER — APPOINTMENT (OUTPATIENT)
Dept: PSYCHIATRY | Facility: CLINIC | Age: 67
End: 2022-04-26
Payer: MEDICARE

## 2022-04-26 PROCEDURE — 90847 FAMILY PSYTX W/PT 50 MIN: CPT | Mod: 95

## 2022-04-28 ENCOUNTER — APPOINTMENT (OUTPATIENT)
Dept: PSYCHIATRY | Facility: CLINIC | Age: 67
End: 2022-04-28

## 2022-04-29 ENCOUNTER — APPOINTMENT (OUTPATIENT)
Dept: PSYCHIATRY | Facility: CLINIC | Age: 67
End: 2022-04-29

## 2022-05-03 ENCOUNTER — APPOINTMENT (OUTPATIENT)
Dept: PSYCHIATRY | Facility: CLINIC | Age: 67
End: 2022-05-03
Payer: MEDICARE

## 2022-05-03 PROCEDURE — 90847 FAMILY PSYTX W/PT 50 MIN: CPT | Mod: 95

## 2022-05-03 PROCEDURE — 99214 OFFICE O/P EST MOD 30 MIN: CPT | Mod: 95

## 2022-05-05 ENCOUNTER — APPOINTMENT (OUTPATIENT)
Dept: PSYCHIATRY | Facility: CLINIC | Age: 67
End: 2022-05-05

## 2022-05-10 ENCOUNTER — APPOINTMENT (OUTPATIENT)
Dept: PSYCHIATRY | Facility: CLINIC | Age: 67
End: 2022-05-10

## 2022-05-12 ENCOUNTER — APPOINTMENT (OUTPATIENT)
Dept: PSYCHIATRY | Facility: CLINIC | Age: 67
End: 2022-05-12

## 2022-05-16 ENCOUNTER — APPOINTMENT (OUTPATIENT)
Dept: PSYCHIATRY | Facility: CLINIC | Age: 67
End: 2022-05-16
Payer: MEDICARE

## 2022-05-16 PROCEDURE — 90834 PSYTX W PT 45 MINUTES: CPT

## 2022-05-17 ENCOUNTER — APPOINTMENT (OUTPATIENT)
Dept: PSYCHIATRY | Facility: CLINIC | Age: 67
End: 2022-05-17
Payer: MEDICARE

## 2022-05-17 PROCEDURE — 90847 FAMILY PSYTX W/PT 50 MIN: CPT | Mod: 95

## 2022-05-18 ENCOUNTER — APPOINTMENT (OUTPATIENT)
Dept: PSYCHIATRY | Facility: CLINIC | Age: 67
End: 2022-05-18
Payer: MEDICARE

## 2022-05-18 PROCEDURE — 99214 OFFICE O/P EST MOD 30 MIN: CPT | Mod: 95

## 2022-05-19 ENCOUNTER — APPOINTMENT (OUTPATIENT)
Dept: PSYCHIATRY | Facility: CLINIC | Age: 67
End: 2022-05-19

## 2022-05-23 ENCOUNTER — APPOINTMENT (OUTPATIENT)
Dept: PSYCHIATRY | Facility: CLINIC | Age: 67
End: 2022-05-23
Payer: MEDICARE

## 2022-05-23 PROCEDURE — 90834 PSYTX W PT 45 MINUTES: CPT | Mod: 95

## 2022-05-24 ENCOUNTER — APPOINTMENT (OUTPATIENT)
Dept: PSYCHIATRY | Facility: CLINIC | Age: 67
End: 2022-05-24
Payer: MEDICARE

## 2022-05-24 PROCEDURE — 90847 FAMILY PSYTX W/PT 50 MIN: CPT | Mod: 95

## 2022-05-26 ENCOUNTER — APPOINTMENT (OUTPATIENT)
Dept: PSYCHIATRY | Facility: CLINIC | Age: 67
End: 2022-05-26

## 2022-05-31 ENCOUNTER — APPOINTMENT (OUTPATIENT)
Dept: PSYCHIATRY | Facility: CLINIC | Age: 67
End: 2022-05-31
Payer: MEDICARE

## 2022-05-31 PROCEDURE — 90847 FAMILY PSYTX W/PT 50 MIN: CPT | Mod: 95

## 2022-06-02 ENCOUNTER — APPOINTMENT (OUTPATIENT)
Dept: PSYCHIATRY | Facility: CLINIC | Age: 67
End: 2022-06-02

## 2022-06-06 ENCOUNTER — APPOINTMENT (OUTPATIENT)
Dept: PSYCHIATRY | Facility: CLINIC | Age: 67
End: 2022-06-06
Payer: MEDICARE

## 2022-06-06 PROCEDURE — 90834 PSYTX W PT 45 MINUTES: CPT

## 2022-06-07 ENCOUNTER — APPOINTMENT (OUTPATIENT)
Dept: PSYCHIATRY | Facility: CLINIC | Age: 67
End: 2022-06-07
Payer: MEDICARE

## 2022-06-07 PROCEDURE — 90847 FAMILY PSYTX W/PT 50 MIN: CPT | Mod: 95

## 2022-06-09 ENCOUNTER — APPOINTMENT (OUTPATIENT)
Dept: PSYCHIATRY | Facility: CLINIC | Age: 67
End: 2022-06-09

## 2022-06-13 ENCOUNTER — APPOINTMENT (OUTPATIENT)
Dept: PSYCHIATRY | Facility: CLINIC | Age: 67
End: 2022-06-13

## 2022-06-14 ENCOUNTER — APPOINTMENT (OUTPATIENT)
Dept: PSYCHIATRY | Facility: CLINIC | Age: 67
End: 2022-06-14
Payer: MEDICARE

## 2022-06-14 PROCEDURE — 90834 PSYTX W PT 45 MINUTES: CPT | Mod: 95

## 2022-06-16 ENCOUNTER — APPOINTMENT (OUTPATIENT)
Dept: PSYCHIATRY | Facility: CLINIC | Age: 67
End: 2022-06-16

## 2022-06-20 ENCOUNTER — APPOINTMENT (OUTPATIENT)
Dept: PSYCHIATRY | Facility: CLINIC | Age: 67
End: 2022-06-20

## 2022-06-21 ENCOUNTER — APPOINTMENT (OUTPATIENT)
Dept: PSYCHIATRY | Facility: CLINIC | Age: 67
End: 2022-06-21

## 2022-06-21 ENCOUNTER — APPOINTMENT (OUTPATIENT)
Dept: PSYCHIATRY | Facility: CLINIC | Age: 67
End: 2022-06-21
Payer: MEDICARE

## 2022-06-21 PROCEDURE — 99214 OFFICE O/P EST MOD 30 MIN: CPT | Mod: 95

## 2022-06-22 ENCOUNTER — APPOINTMENT (OUTPATIENT)
Dept: PSYCHIATRY | Facility: CLINIC | Age: 67
End: 2022-06-22
Payer: MEDICARE

## 2022-06-22 PROCEDURE — 90834 PSYTX W PT 45 MINUTES: CPT | Mod: 95

## 2022-06-27 ENCOUNTER — APPOINTMENT (OUTPATIENT)
Dept: PSYCHIATRY | Facility: CLINIC | Age: 67
End: 2022-06-27

## 2022-06-28 ENCOUNTER — APPOINTMENT (OUTPATIENT)
Dept: PSYCHIATRY | Facility: CLINIC | Age: 67
End: 2022-06-28

## 2022-07-05 ENCOUNTER — APPOINTMENT (OUTPATIENT)
Dept: PSYCHIATRY | Facility: CLINIC | Age: 67
End: 2022-07-05

## 2022-07-05 PROCEDURE — 99214 OFFICE O/P EST MOD 30 MIN: CPT | Mod: 95

## 2022-07-05 RX ORDER — TRAZODONE HYDROCHLORIDE 50 MG/1
50 TABLET ORAL AT BEDTIME
Qty: 30 | Refills: 0 | Status: ACTIVE | COMMUNITY
Start: 2022-07-05 | End: 1900-01-01

## 2022-07-08 ENCOUNTER — APPOINTMENT (OUTPATIENT)
Dept: PSYCHIATRY | Facility: CLINIC | Age: 67
End: 2022-07-08

## 2022-07-08 PROCEDURE — 90834 PSYTX W PT 45 MINUTES: CPT | Mod: 95

## 2022-07-11 ENCOUNTER — APPOINTMENT (OUTPATIENT)
Dept: PSYCHIATRY | Facility: CLINIC | Age: 67
End: 2022-07-11

## 2022-07-12 ENCOUNTER — APPOINTMENT (OUTPATIENT)
Dept: PSYCHIATRY | Facility: CLINIC | Age: 67
End: 2022-07-12

## 2022-07-14 ENCOUNTER — APPOINTMENT (OUTPATIENT)
Dept: PSYCHIATRY | Facility: CLINIC | Age: 67
End: 2022-07-14

## 2022-07-14 PROCEDURE — 90832 PSYTX W PT 30 MINUTES: CPT | Mod: 95

## 2022-07-18 ENCOUNTER — APPOINTMENT (OUTPATIENT)
Dept: PSYCHIATRY | Facility: CLINIC | Age: 67
End: 2022-07-18

## 2022-07-19 ENCOUNTER — APPOINTMENT (OUTPATIENT)
Dept: PSYCHIATRY | Facility: CLINIC | Age: 67
End: 2022-07-19

## 2022-07-25 ENCOUNTER — APPOINTMENT (OUTPATIENT)
Dept: PSYCHIATRY | Facility: CLINIC | Age: 67
End: 2022-07-25

## 2022-07-28 ENCOUNTER — APPOINTMENT (OUTPATIENT)
Dept: PSYCHIATRY | Facility: CLINIC | Age: 67
End: 2022-07-28

## 2022-07-28 PROCEDURE — 90832 PSYTX W PT 30 MINUTES: CPT | Mod: 95

## 2022-08-01 ENCOUNTER — APPOINTMENT (OUTPATIENT)
Dept: PSYCHIATRY | Facility: CLINIC | Age: 67
End: 2022-08-01

## 2022-08-01 PROCEDURE — 90834 PSYTX W PT 45 MINUTES: CPT | Mod: 95

## 2022-08-08 ENCOUNTER — APPOINTMENT (OUTPATIENT)
Dept: PSYCHIATRY | Facility: CLINIC | Age: 67
End: 2022-08-08

## 2022-08-09 ENCOUNTER — APPOINTMENT (OUTPATIENT)
Dept: PSYCHIATRY | Facility: CLINIC | Age: 67
End: 2022-08-09

## 2022-08-15 ENCOUNTER — APPOINTMENT (OUTPATIENT)
Dept: PSYCHIATRY | Facility: CLINIC | Age: 67
End: 2022-08-15

## 2022-08-15 PROCEDURE — 90834 PSYTX W PT 45 MINUTES: CPT | Mod: 95

## 2022-08-23 ENCOUNTER — APPOINTMENT (OUTPATIENT)
Dept: PSYCHIATRY | Facility: CLINIC | Age: 67
End: 2022-08-23

## 2022-08-23 PROCEDURE — 99214 OFFICE O/P EST MOD 30 MIN: CPT | Mod: 95

## 2022-08-23 PROCEDURE — 90847 FAMILY PSYTX W/PT 50 MIN: CPT | Mod: 95

## 2022-08-24 ENCOUNTER — APPOINTMENT (OUTPATIENT)
Dept: PSYCHIATRY | Facility: CLINIC | Age: 67
End: 2022-08-24

## 2022-08-24 PROCEDURE — 90832 PSYTX W PT 30 MINUTES: CPT | Mod: 95

## 2022-08-29 ENCOUNTER — APPOINTMENT (OUTPATIENT)
Dept: PSYCHIATRY | Facility: CLINIC | Age: 67
End: 2022-08-29

## 2022-08-29 PROCEDURE — 90834 PSYTX W PT 45 MINUTES: CPT | Mod: 95

## 2022-09-06 ENCOUNTER — APPOINTMENT (OUTPATIENT)
Dept: PSYCHIATRY | Facility: CLINIC | Age: 67
End: 2022-09-06

## 2022-09-16 ENCOUNTER — APPOINTMENT (OUTPATIENT)
Dept: PSYCHIATRY | Facility: CLINIC | Age: 67
End: 2022-09-16

## 2022-09-16 PROCEDURE — 90834 PSYTX W PT 45 MINUTES: CPT | Mod: 95

## 2022-09-19 ENCOUNTER — APPOINTMENT (OUTPATIENT)
Dept: PSYCHIATRY | Facility: CLINIC | Age: 67
End: 2022-09-19

## 2022-09-19 PROCEDURE — 90834 PSYTX W PT 45 MINUTES: CPT | Mod: 59,95

## 2022-09-19 PROCEDURE — 90847 FAMILY PSYTX W/PT 50 MIN: CPT | Mod: 95

## 2022-09-19 NOTE — END OF VISIT
[Individual Psychotherapy for 38-52 minutes] : Individual Psychotherapy for 38 - 52 minutes [Duration of Psychotherapy Visit (minutes spent in synchronous communication): ____] : Duration of Psychotherapy Visit (minutes spent in synchronous communication): [unfilled] [Family Therapy With Client Present] : Family Therapy With Client Present  [Teletherapy Service Provided] : The services provided in this session were delivered via tele-therapy [FreeTextEntry3] : Home [FreeTextEntry5] : Home [FreeTextEntry6] :  was present and a participant in couples counseling

## 2022-09-19 NOTE — PLAN
[FreeTextEntry2] : Decrease anxiety [Supportive Therapy] : Supportive Therapy [Emotionally Focused Couples Therapy] : Emotionally Focused Couples Therapy [de-identified] : Patient and  reported that their 33th anniversary is tomorrow. Patient and  described feeling a mix of feli and difficult memories associated with the event. Patient had just experienced Worship trauma and  had just lost his brother a few weeks prior. Patient and  reported that the anniversary has triggered challenging emotions. Emotionally focused therapy interventions used throughout the session. Patient and  were engaged and receptive to interventions used and ended the session in good emotional and behavioral control.

## 2022-09-19 NOTE — PLAN
[FreeTextEntry2] : Decrease anxiety [Supportive Therapy] : Supportive Therapy [Emotionally Focused Couples Therapy] : Emotionally Focused Couples Therapy [de-identified] : Patient and  reported that their 33th anniversary is tomorrow. Patient and  described feeling a mix of feli and difficult memories associated with the event. Patient had just experienced Confucianism trauma and  had just lost his brother a few weeks prior. Patient and  reported that the anniversary has triggered challenging emotions. Emotionally focused therapy interventions used throughout the session. Patient and  were engaged and receptive to interventions used and ended the session in good emotional and behavioral control.

## 2022-09-19 NOTE — REASON FOR VISIT
[Home] : at home, [unfilled] , at the time of the visit. [Other Location: e.g. Home (Enter Location, City,State)___] : at [unfilled] [Self] : self [Patient] : Patient

## 2022-09-19 NOTE — PLAN
[Supportive Therapy] : Supportive Therapy [de-identified] : Regi presented with a euthymic affect. She discussed her employment at a  and feelings it stirred up for her. She reported that her sister is quite ill and how the family dynamics are impacting her emotional well being. I conveyed empathy and warmth as I normalized her feelings. She appeared to be receptive to my intervention and left the session in good emotional control.

## 2022-09-19 NOTE — END OF VISIT
[Duration of Psychotherapy Visit (minutes spent in synchronous communication): ____] : Duration of Psychotherapy Visit (minutes spent in synchronous communication): [unfilled] [Individual Psychotherapy for 38-52 minutes] : Individual Psychotherapy for 38 - 52 minutes [Teletherapy Service Provided] : The services provided in this session were delivered via tele-therapy [FreeTextEntry3] : home [FreeTextEntry5] : PIPER Nicholson

## 2022-09-19 NOTE — PLAN
[Supportive Therapy] : Supportive Therapy [de-identified] : Regi presented with a euthymic affect. She reported mood stability and intense thoughts/feelings about herself. I provided supportive therapy as evidenced by conveying empathy and validating her feelings. I connected her early childhood role in her family and the current dynamics in her relationship with her sisters currently. She appeared to be receptive to my intervention and left the session in good emotional control.

## 2022-09-19 NOTE — REASON FOR VISIT
[Patient] : Patient [Patient with collateral] : Patient with collateral  [Spouse] : spouse [TextBox_17] : James Del Rio [FreeTextEntry1] : Adjustment disorder with mixed anxiety and depressed mood, couples counseling

## 2022-09-20 ENCOUNTER — APPOINTMENT (OUTPATIENT)
Dept: PSYCHIATRY | Facility: CLINIC | Age: 67
End: 2022-09-20

## 2022-09-20 PROCEDURE — 99214 OFFICE O/P EST MOD 30 MIN: CPT | Mod: 95

## 2022-09-20 NOTE — SOCIAL HISTORY
[FreeTextEntry1] : Domiciled in [place] with  and the younger of two adopted daughters from [place] (older daughter is away at college).  Family history of alcohol use disorder.  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   [No Known Substance Use] : no known substance use

## 2022-09-20 NOTE — PHYSICAL EXAM
[Average] : average [Cooperative] : cooperative [Euthymic] : euthymic [Full] : full [Clear] : clear [Linear/Goal Directed] : linear/goal directed [WNL] : within normal limits
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
## 2022-09-20 NOTE — REASON FOR VISIT
[Home] : at home, [unfilled] , at the time of the visit. [Other Location: e.g. Home (Enter Location, City,State)___] : at [unfilled] [Patient] : Patient [FreeTextEntry1] : [facility]

## 2022-09-20 NOTE — HISTORY OF PRESENT ILLNESS
[Not Applicable] : Not applicable [FreeTextEntry1] : Denies change from baseline mood, anxiety, sleep. She denies SI/HI. [FreeTextEntry2] : See PPHx [FreeTextEntry3] : See PPHx [No] : no

## 2022-09-20 NOTE — PLAN
[No] : No [Medication education provided] : Medication education provided. [Rationale for medication choices, possible risks/precautions, benefits, alternative treatment choices, and consequences of non-treatment discussed] : Rationale for medication choices, possible risks/precautions, benefits, alternative treatment choices, and consequences of non-treatment discussed with patient/family/caregiver  [Order(s) for medication placed in [facility] EHR] : Medication [FreeTextEntry5] : \par #) Continue fluoxetine 80mg PO QD #) Continue propranolol 40mg PO BID PRN anxiety  #) Continue I/G therapy #) f/u in 1 month #) Call 911 or go to ER in case of emergency

## 2022-09-20 NOTE — RISK ASSESSMENT
[No, patient denies ideation or behavior] : No, patient denies ideation or behavior [FreeTextEntry9] : No acute risk.

## 2022-09-20 NOTE — DISCUSSION/SUMMARY
[FreeTextEntry1] : 66 year old , domiciled with family, employed  woman with history of Borderline Personality Disorder who continues to report affective instability, destabilization, tearfulness, and feelings of low self-worth in the setting of multiple psychosocial stressors.\par \par 30 mins spent: Psychoeducation, supportive counseling, prescribing meds, documentation. Discussed interpersonal challenges related to family gathering. Shared decision to continue fluoxetine and propranolol  at current doses given good tolerance and response. Explored past trauma related to . Patient is aware of risks vs benefits of medications and potential side effects. \par

## 2022-09-26 ENCOUNTER — APPOINTMENT (OUTPATIENT)
Dept: PSYCHIATRY | Facility: CLINIC | Age: 67
End: 2022-09-26

## 2022-10-03 ENCOUNTER — APPOINTMENT (OUTPATIENT)
Dept: PSYCHIATRY | Facility: CLINIC | Age: 67
End: 2022-10-03

## 2022-10-04 ENCOUNTER — APPOINTMENT (OUTPATIENT)
Dept: PSYCHIATRY | Facility: CLINIC | Age: 67
End: 2022-10-04

## 2022-10-04 PROCEDURE — 90847 FAMILY PSYTX W/PT 50 MIN: CPT | Mod: 95

## 2022-10-04 NOTE — END OF VISIT
[Duration of Psychotherapy Visit (minutes spent in synchronous communication): ____] : Duration of Psychotherapy Visit (minutes spent in synchronous communication): [unfilled] [Family Therapy With Client Present] : Family Therapy With Client Present  [Teletherapy Service Provided] : The services provided in this session were delivered via tele-therapy [FreeTextEntry3] : Home [FreeTextEntry5] : Home [FreeTextEntry6] :  was present and a participant in couples counseling

## 2022-10-04 NOTE — PLAN
[Emotionally Focused Couples Therapy] : Emotionally Focused Couples Therapy [de-identified] : Patient and  reported that they have connected more recently and have gone out together for the last two weekends. Patient and  enjoyed themselves and intend to do it more often. Patient and  discussed the medical and mental state of patient's sister and how it has affected them. Emotionally focused therapy interventions used throughout the session. Patient and  were engaged and receptive to interventions used and ended the session in good emotional and behavioral control.  [FreeTextEntry1] : Biweekly couples therapy

## 2022-10-04 NOTE — REASON FOR VISIT
[Patient with collateral] : Patient with collateral  [Spouse] : spouse [TextBox_17] : James Del Rio [FreeTextEntry1] : Adjustment disorder with mixed anxiety and depressed mood, couples counseling

## 2022-10-10 ENCOUNTER — APPOINTMENT (OUTPATIENT)
Dept: PSYCHIATRY | Facility: CLINIC | Age: 67
End: 2022-10-10

## 2022-10-10 PROCEDURE — 90834 PSYTX W PT 45 MINUTES: CPT | Mod: 95

## 2022-10-10 NOTE — PLAN
[Supportive Therapy] : Supportive Therapy [de-identified] : Regi presented with a euthymic affect. She reported mood stability over the past couple of weeks. She discussed her sister's illness and the support she's providing to her. I conveyed empathy about her sister's health and noted her strengths. She appeared to be receptive to my intervention and left the session in good emotional control.

## 2022-10-17 ENCOUNTER — APPOINTMENT (OUTPATIENT)
Dept: PSYCHIATRY | Facility: CLINIC | Age: 67
End: 2022-10-17

## 2022-10-17 PROCEDURE — 90834 PSYTX W PT 45 MINUTES: CPT | Mod: 95

## 2022-10-17 NOTE — PLAN
[Supportive Therapy] : Supportive Therapy [de-identified] : Regi presented with a euthymic affect. She discussed early feelings of shame and the impact on her current well being. I provided supportive therapy as evidenced conveying empathy and validating her feelings. She appeared to be receptive to my intervention and left the session in good emotional control.

## 2022-10-18 ENCOUNTER — APPOINTMENT (OUTPATIENT)
Dept: PSYCHIATRY | Facility: CLINIC | Age: 67
End: 2022-10-18

## 2022-10-18 PROCEDURE — 90834 PSYTX W PT 45 MINUTES: CPT | Mod: 95

## 2022-10-18 PROCEDURE — 99214 OFFICE O/P EST MOD 30 MIN: CPT | Mod: 95

## 2022-10-18 NOTE — SOCIAL HISTORY
[No Known Substance Use] : no known substance use [FreeTextEntry1] : Domiciled in [place] with  and the younger of two adopted daughters from [place] (older daughter is away at college).  Family history of alcohol use disorder.  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

## 2022-10-18 NOTE — HISTORY OF PRESENT ILLNESS
[Not Applicable] : Not applicable [No] : no [FreeTextEntry1] : Patient is seen and evaluated. Interval history reviewed. She recently had a nice visit with her daughter in [place]. She decided to postpone graduate studies.  One of her sister's is quite ill with advanced breast cancer. 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 She continues to engage in I/G therapy. Denies change from baseline mood, anxiety, sleep. She denies SI/HI. [FreeTextEntry2] : See PPHx [FreeTextEntry3] : See PPHx

## 2022-10-18 NOTE — PLAN
[No] : No [Medication education provided] : Medication education provided. [Rationale for medication choices, possible risks/precautions, benefits, alternative treatment choices, and consequences of non-treatment discussed] : Rationale for medication choices, possible risks/precautions, benefits, alternative treatment choices, and consequences of non-treatment discussed with patient/family/caregiver  [Order(s) for medication placed in [facility] EHR] : Medication [FreeTextEntry5] : #) Continue fluoxetine 80mg PO QD #) Continue propranolol 40mg PO BID PRN anxiety  #) Continue I/G therapy #) f/u in 1 month #) Call 911 or go to ER in case of emergency

## 2022-10-18 NOTE — PLAN
[Emotionally Focused Couples Therapy] : Emotionally Focused Couples Therapy [de-identified] : Patient and  reported that recently visited their daughter in [place] and had a great time. Patient and  described how they were able to help patient's sisters see things from a different perspective. Patient hopeful that they may be able to participate more in their other sister's care. Patient and  reported that they continue to go out more frequently together and have found that it allows them to connect with each other more easily. Emotionally focused therapy interventions used throughout the session. Patient and  were engaged and receptive to interventions used and ended the session in good emotional and behavioral control.  [FreeTextEntry1] : Biweekly couples therapy

## 2022-10-18 NOTE — DISCUSSION/SUMMARY
[FreeTextEntry1] : 66 year old , domiciled with family, employed  woman with history of Borderline Personality Disorder, JAMESON, who continues to report affective instability, destabilization, tearfulness, and feelings of low self-worth in the setting of multiple psychosocial stressors.\par \par 30 mins spent: Psychoeducation, supportive counseling, prescribing meds, documentation. Discussed interpersonal challenges, career plans. Shared decision to continue fluoxetine and propranolol  at current doses given good tolerance and response. Explored past trauma related to . Patient is aware of risks vs benefits of medications and potential side effects. \par

## 2022-10-18 NOTE — RISK ASSESSMENT
[No, patient denies ideation or behavior] : No, patient denies ideation or behavior [No] : No [FreeTextEntry9] : No acute risk. Protective factors include connection to tx, engagement with providers, family support, future orientation.

## 2022-10-18 NOTE — REASON FOR VISIT
[Home] : at home, [unfilled] , at the time of the visit. [Other Location: e.g. Home (Enter Location, City,State)___] : at [unfilled] [Patient] : the patient [FreeTextEntry1] : Psychiatric Follow Up

## 2022-10-24 ENCOUNTER — APPOINTMENT (OUTPATIENT)
Dept: PSYCHIATRY | Facility: CLINIC | Age: 67
End: 2022-10-24

## 2022-10-24 PROCEDURE — 90834 PSYTX W PT 45 MINUTES: CPT | Mod: 95

## 2022-10-24 NOTE — PLAN
[Supportive Therapy] : Supportive Therapy [de-identified] : Regi presented with some sadness today. She discussed conflict with her  and feelings it stirred up for her She made connections to early childhood and people who have dismissed her feelings/opinions. I provided supportive therapy and validated her feelings. She appeared to be receptive to my intervention and left the session in good emotional control.

## 2022-10-31 ENCOUNTER — APPOINTMENT (OUTPATIENT)
Dept: PSYCHIATRY | Facility: CLINIC | Age: 67
End: 2022-10-31

## 2022-11-01 ENCOUNTER — APPOINTMENT (OUTPATIENT)
Dept: PSYCHIATRY | Facility: CLINIC | Age: 67
End: 2022-11-01

## 2022-11-01 PROCEDURE — 90847 FAMILY PSYTX W/PT 50 MIN: CPT | Mod: 95

## 2022-11-01 NOTE — PLAN
[Emotionally Focused Couples Therapy] : Emotionally Focused Couples Therapy [de-identified] : Patient and  reported that feelings of insecurity and helplessness related to raising their daughter. Patent expressed uncertainty if she prepared her daughter enough for hardship.  pointed out the many ways patient has done a good job as a parent. Emotionally focused therapy interventions used throughout the session. Patient and  were engaged and receptive to interventions used and ended the session in good emotional and behavioral control.  [FreeTextEntry1] : Biweekly couples therapy

## 2022-11-07 ENCOUNTER — APPOINTMENT (OUTPATIENT)
Dept: PSYCHIATRY | Facility: CLINIC | Age: 67
End: 2022-11-07

## 2022-11-07 ENCOUNTER — NON-APPOINTMENT (OUTPATIENT)
Age: 67
End: 2022-11-07

## 2022-11-07 NOTE — DISCUSSION/SUMMARY
[FreeTextEntry1] : Regi cancelled today as she is working, we will try to reschedule for later this week.

## 2022-11-14 ENCOUNTER — APPOINTMENT (OUTPATIENT)
Dept: PSYCHIATRY | Facility: CLINIC | Age: 67
End: 2022-11-14

## 2022-11-14 ENCOUNTER — NON-APPOINTMENT (OUTPATIENT)
Age: 67
End: 2022-11-14

## 2022-11-15 ENCOUNTER — APPOINTMENT (OUTPATIENT)
Dept: PSYCHIATRY | Facility: CLINIC | Age: 67
End: 2022-11-15

## 2022-11-15 PROCEDURE — 90847 FAMILY PSYTX W/PT 50 MIN: CPT | Mod: 95

## 2022-11-15 NOTE — PLAN
[Emotionally Focused Couples Therapy] : Emotionally Focused Couples Therapy [de-identified] : Patient and  reported visiting patient's sister in [place]. Patient described feeling triggered when seeing her sister. Patient stated that  was very supportive when patient was feeling stressed. Patient and  reported going on walks together as a way of caring for themselves during this stressful visit. Emotionally focused therapy interventions used throughout the session. Patient and  were engaged and receptive to interventions used and ended the session in good emotional and behavioral control.  [FreeTextEntry1] : Biweekly couples therapy

## 2022-11-21 ENCOUNTER — APPOINTMENT (OUTPATIENT)
Dept: PSYCHIATRY | Facility: CLINIC | Age: 67
End: 2022-11-21

## 2022-11-28 ENCOUNTER — APPOINTMENT (OUTPATIENT)
Dept: PSYCHIATRY | Facility: CLINIC | Age: 67
End: 2022-11-28

## 2022-11-29 ENCOUNTER — APPOINTMENT (OUTPATIENT)
Dept: PSYCHIATRY | Facility: CLINIC | Age: 67
End: 2022-11-29

## 2022-12-02 ENCOUNTER — APPOINTMENT (OUTPATIENT)
Dept: PSYCHIATRY | Facility: CLINIC | Age: 67
End: 2022-12-02

## 2022-12-02 PROCEDURE — 90834 PSYTX W PT 45 MINUTES: CPT | Mod: 95

## 2022-12-05 ENCOUNTER — APPOINTMENT (OUTPATIENT)
Dept: PSYCHIATRY | Facility: CLINIC | Age: 67
End: 2022-12-05

## 2022-12-05 NOTE — PLAN
[Supportive Therapy] : Supportive Therapy [de-identified] : Regi presented with sadness. We discussed our absence from treatment due to her family related concerns and work. She reported mood stability over the past few weeks, but cried during our session. I probed deeper feelings and provided supportive therapy. She appeared to be receptive to my intervention and left the session in good emotional control.

## 2022-12-09 ENCOUNTER — APPOINTMENT (OUTPATIENT)
Dept: PSYCHIATRY | Facility: CLINIC | Age: 67
End: 2022-12-09

## 2022-12-09 PROCEDURE — 90834 PSYTX W PT 45 MINUTES: CPT | Mod: 95

## 2022-12-09 PROCEDURE — 99214 OFFICE O/P EST MOD 30 MIN: CPT | Mod: 95

## 2022-12-09 NOTE — HISTORY OF PRESENT ILLNESS
[Not Applicable] : Not applicable [FreeTextEntry1] : Patient is seen and evaluated. Interval history reviewed. 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 She also went to visit her ill sister in [place]. She is taking propranolol with good effect. She is nervous as the holidays approach given she experiences increased levels of anxiety this time of year. Denies change from baseline mood, anxiety, sleep. She denies SI/HI. [FreeTextEntry2] : See PPHx [FreeTextEntry3] : See PPHx

## 2022-12-09 NOTE — PLAN
[No] : No [Medication education provided] : Medication education provided. [Rationale for medication choices, possible risks/precautions, benefits, alternative treatment choices, and consequences of non-treatment discussed] : Rationale for medication choices, possible risks/precautions, benefits, alternative treatment choices, and consequences of non-treatment discussed with patient/family/caregiver  [Order(s) for medication placed in [facility] EHR] : Medication [FreeTextEntry5] : #) Continue fluoxetine 80mg PO QD #) Continue propranolol 40mg PO BID PRN anxiety  #) Continue I/G therapy #) f/u in 1 month #) Call 911 or go to ER in case of emergency

## 2022-12-09 NOTE — SOCIAL HISTORY
[No Known Substance Use] : no known substance use [FreeTextEntry1] : Domiciled in [place] with  and the younger of two adopted daughters from [place] (older daughter is away at college).  Family history of alcohol use disorder.  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

## 2022-12-09 NOTE — DISCUSSION/SUMMARY
[FreeTextEntry1] : 66 year old , domiciled with family, employed  woman with history of Borderline Personality Disorder, JAMESON, who continues to report affective instability, destabilization, tearfulness, and feelings of low self-worth in the setting of multiple psychosocial stressors.\par \par 30 mins spent: Psychoeducation, supportive counseling, prescribing meds, documentation. Discussed interpersonal challenges, career plans. Shared decision to continue fluoxetine and propranolol  at current doses given good tolerance and response. Patient is aware of risks vs benefits of medications and potential side effects. \par
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
## 2022-12-12 ENCOUNTER — APPOINTMENT (OUTPATIENT)
Dept: PSYCHIATRY | Facility: CLINIC | Age: 67
End: 2022-12-12

## 2022-12-12 NOTE — PLAN
[Supportive Therapy] : Supportive Therapy [de-identified] : Regi presented with sadness. She discussed her upcoming birthday and feelings about it. I provided supportive therapy and normalized her feelings. She appeared to be receptive to my intervention and left the session in good emotional control.

## 2022-12-13 ENCOUNTER — APPOINTMENT (OUTPATIENT)
Dept: PSYCHIATRY | Facility: CLINIC | Age: 67
End: 2022-12-13

## 2022-12-13 PROCEDURE — 90834 PSYTX W PT 45 MINUTES: CPT | Mod: 95

## 2022-12-15 NOTE — REASON FOR VISIT
[Patient with collateral] : Patient with collateral  [Home] : at home, [unfilled] , at the time of the visit. [Other Location: e.g. Home (Enter Location, City,State)___] : at [unfilled] [Spouse] : spouse [Patient] : the patient [FreeTextEntry2] : Regi De La Rosa [TextBox_17] : James Del Rio [FreeTextEntry1] : Adjustment disorder with mixed anxiety and depressed mood, couples counseling

## 2022-12-15 NOTE — PLAN
[Emotionally Focused Couples Therapy] : Emotionally Focused Couples Therapy [de-identified] : Patient and  described a dynamic where patient may criticize partner and partner will respond with an outsized reaction. Patient and partner explored what emotions they are experiencing in those moments and how they could approach arguments in a more productive way. Emotionally focused therapy interventions used throughout the session. Patient and  were engaged and receptive to interventions used and ended the session in good emotional and behavioral control.  [FreeTextEntry1] : Weekly couples therapy

## 2022-12-16 ENCOUNTER — APPOINTMENT (OUTPATIENT)
Dept: PSYCHIATRY | Facility: CLINIC | Age: 67
End: 2022-12-16

## 2022-12-16 PROCEDURE — 90834 PSYTX W PT 45 MINUTES: CPT | Mod: 95

## 2022-12-19 ENCOUNTER — APPOINTMENT (OUTPATIENT)
Dept: PSYCHIATRY | Facility: CLINIC | Age: 67
End: 2022-12-19

## 2022-12-20 ENCOUNTER — APPOINTMENT (OUTPATIENT)
Dept: PSYCHIATRY | Facility: CLINIC | Age: 67
End: 2022-12-20

## 2022-12-20 PROCEDURE — 90834 PSYTX W PT 45 MINUTES: CPT | Mod: 95

## 2022-12-20 NOTE — PLAN
[Supportive Therapy] : Supportive Therapy [de-identified] : Regi presented with a euthymic affect. She discussed continued interpersonal dynamics at work and the feelings it stirs up in her. I provided supportive therapy and validate her feelings. She appeared to be receptive to my intervention and left the session in good emotional control.

## 2022-12-20 NOTE — PLAN
[Supportive Therapy] : Supportive Therapy [de-identified] : Regi presented with a sad affect. She discussed her birthday and feelings about it. She also cried during some of the session due to interpersonal conflict with co-workers. I provided supportive therapy and validated her feelings. She appeared to be receptive to my interventions and left the session in good emotional control.

## 2022-12-20 NOTE — END OF VISIT
Dallas Homans is a 59 y.o. female presenting today for Wound Check  . HPI:  Dallas Homans presents to the office today for medial calf region wound check. Patient was seen in the practice on 10/2/2000 18 x 1 week ago. She has been self treating some wounds with peroxide and bandages. She was negative for fever and denied any injury. Her hemoglobin on 10/2/2018 was 10.4 and during the visit her metformin was increased to 750 mg twice daily and the Lantus was increased to 40 units daily. Patient was also referred to nutritionist, wound care, and vascular doctor. Patient has engorged varicose vein. Patient presents today for follow-up and reports that the wounds are improving. She was treated with Keflex antibiotic and reports she is taking it as prescribed. She is negative for fever and notes she has not heard from wound care or vascular. Review of Systems   Constitutional: Negative for chills and fever. Respiratory: Negative for cough. Cardiovascular: Negative for chest pain and palpitations. Allergies   Allergen Reactions    Clindamycin Hives    Sulfa (Sulfonamide Antibiotics) Unable to Obtain    Lisinopril Cough       Current Outpatient Prescriptions   Medication Sig Dispense Refill    amLODIPine (NORVASC) 5 mg tablet TAKE 1 TABLET BY MOUTH ONCE DAILY 30 Tab 1    cephALEXin (KEFLEX) 500 mg capsule Take 1 Cap by mouth four (4) times daily for 10 days.  40 Cap 0    metFORMIN ER (GLUCOPHAGE XR) 750 mg tablet Take 1 tablet by mouth twice daily 60 Tab 2    TRUE METRIX GLUCOSE TEST STRIP strip CHECK BLOOD SUGAR DAILY 50 Strip 1    glucose blood VI test strips (TRUE METRIX GLUCOSE TEST STRIP) strip Test Blood Glucose once daily; ICD 10 E11.9, Quantity 100 100 Strip 3    losartan-hydroCHLOROthiazide (HYZAAR) 100-12.5 mg per tablet TAKE 1 TABLET BY MOUTH ONCE DAILY 90 Tab 2    atorvastatin (LIPITOR) 40 mg tablet 1 tablet daily 90 Tab 3    glimepiride (AMARYL) 2 mg tablet Take 1 Tab [Duration of Psychotherapy Visit (minutes spent in synchronous communication): ____] : Duration of Psychotherapy Visit (minutes spent in synchronous communication): [unfilled] [Individual Psychotherapy for 38-52 minutes] : Individual Psychotherapy for 38 - 52 minutes by mouth two (2) times a day. 120 Tab 3    LANTUS SOLOSTAR U-100 INSULIN 100 unit/mL (3 mL) inpn INJECT 20 UNITS UNDER THE SKIN ONCE DAILY (Patient taking differently: INJECT 40 UNITS UNDER THE SKIN ONCE DAILY) 15 Adjustable Dose Pre-filled Pen Syringe 1    Blood-Glucose Meter (TRUE METRIX AIR GLUCOSE METER) monitoring kit Test Blood Glucose once daily; ICD 10 E11.9 1 Kit 0    Insulin Needles, Disposable, (PEN NEEDLE) 32 gauge x 5/32\" ndle For insulin shots 100 Pen Needle 1    Omeprazole delayed release (PRILOSEC D/R) 20 mg tablet Take 1 Tab by mouth daily. 30 Tab 1    guaiFENesin-codeine (ROBITUSSIN AC) 100-10 mg/5 mL solution Take 5 mL by mouth three (3) times daily as needed for Cough. Max Daily Amount: 15 mL. Do not drive if taking this medication 118 mL 0       Past Medical History:   Diagnosis Date    Essential hypertension, benign     Mixed hyperlipidemia     Type II or unspecified type diabetes mellitus without mention of complication, not stated as uncontrolled        Past Surgical History:   Procedure Laterality Date    HX HYSTERECTOMY  80s    X1       Social History     Social History    Marital status: SINGLE     Spouse name: N/A    Number of children: N/A    Years of education: N/A     Occupational History    Not on file. Social History Main Topics    Smoking status: Never Smoker    Smokeless tobacco: Never Used    Alcohol use No    Drug use: No    Sexual activity: Not Currently     Partners: Male     Other Topics Concern    Not on file     Social History Narrative       Patient does have an advanced directive on file    Vitals:    10/09/18 1054   BP: 102/68   Pulse: 85   Resp: 16   Temp: 98.2 °F (36.8 °C)   TempSrc: Tympanic   SpO2: 96%   Weight: 197 lb (89.4 kg)   Height: 5' 6\" (1.676 m)   PainSc:   8   PainLoc: Leg       Physical Exam   Cardiovascular: Normal rate, regular rhythm and normal heart sounds.     Pulmonary/Chest: Effort normal and breath sounds normal.   Skin: Skin [Teletherapy Service Provided] : The services provided in this session were delivered via tele-therapy is warm. There is erythema. Nursing note and vitals reviewed. Office Visit on 10/02/2018   Component Date Value Ref Range Status    Hemoglobin A1c (POC) 10/02/2018 10.4  % Final    Gram Stain 10/02/2018 No white blood cells seen. Final    Gram Stain 10/02/2018 Many gram negative diplococci. Final    Gram Stain 10/02/2018 Many gram positive cocci. Final    Gram Stain 10/02/2018 Moderate number of gram positive rods. Final    Aerobic culture 10/02/2018 *  Final                    Value:Staphylococcus aureus  Heavy growth      Comment: Methicillin resistant (MRSA)  Based on resistance to oxacillin this isolate would be resistant to  all currently available beta-lactam antimicrobial agents, with the  exception of the newer cephalosporins with anti-MRSA activity, such as  Ceftaroline      Aerobic culture 10/02/2018 *  Final                    Value:Beta hemolytic Streptococcus, group B  Moderate growth      Comment: Penicillin and ampicillin are drugs of choice for treatment of  beta-hemolytic streptococcal infections. Susceptibility testing of  penicillins and other beta-lactam agents approved by the FDA for  treatment of beta-hemolytic streptococcal infections need not be  performed routinely because nonsusceptible isolates are extremely  rare in any beta-hemolytic streptococcus and have not been reported  for Streptococcus pyogenes (group A). (CLSI 2011)         . No results found for any visits on 10/09/18. Assessment / Plan:      ICD-10-CM ICD-9-CM    1. Open leg wound, left, initial encounter S81.802A 891.0    2. Open wound of right lower extremity, initial encounter S81.801A 891.0    3. Uncontrolled type 2 diabetes mellitus with hyperglycemia (HCC) E11.65 250.02    4. Essential hypertension, benign I10 401.1      Bilateral open leg wounds-patient treated with Keflex prescription last week. Legs improving. Patient is scheduled to see wound care.   Encouraged to keep an appointment for [FreeTextEntry3] : home evaluation  Diabetes mellitus-no change to current regimen. Patient reports she has increased her medication as previously noted on her visit last week. Hypertension controlled blood pressure today 102/68. No change to current regimen. Follow-up in 1 week if has not been evaluated by wound care. Follow-up Disposition:  Return in about 1 week (around 10/16/2018), or if symptoms worsen or fail to improve. I asked the patient if she  had any questions and answered her  questions.   The patient stated that she understands the treatment plan and agrees with the treatment plan [FreeTextEntry5] : PIPER Nicholson

## 2022-12-23 ENCOUNTER — APPOINTMENT (OUTPATIENT)
Dept: PSYCHIATRY | Facility: CLINIC | Age: 67
End: 2022-12-23

## 2022-12-27 ENCOUNTER — APPOINTMENT (OUTPATIENT)
Dept: PSYCHIATRY | Facility: CLINIC | Age: 67
End: 2022-12-27

## 2022-12-27 PROCEDURE — 90847 FAMILY PSYTX W/PT 50 MIN: CPT | Mod: 95

## 2022-12-27 NOTE — PLAN
[Emotionally Focused Couples Therapy] : Emotionally Focused Couples Therapy [de-identified] : Patient reported feeling left out of conversations about their holiday plans. Patient expressed feeling triggered, especially due to the high stress environment of the holidays. Partner listened to patient and stated that he would try his best to consider her feelings and include her in any discussion of plans going forward. Emotionally focused therapy interventions used throughout the session. Patient and  were engaged and receptive to interventions used and ended the session in good emotional and behavioral control.  [FreeTextEntry1] : Weekly couples therapy

## 2022-12-27 NOTE — REASON FOR VISIT
[Home] : at home, [unfilled] , at the time of the visit. [Other Location: e.g. Home (Enter Location, City,State)___] : at [unfilled] [Patient] : the patient [FreeTextEntry2] : Regi De La Rosa [Patient with collateral] : Patient with collateral  [Spouse] : spouse [TextBox_17] : James Del Rio [FreeTextEntry1] : Adjustment disorder with mixed anxiety and depressed mood, couples counseling

## 2022-12-30 ENCOUNTER — APPOINTMENT (OUTPATIENT)
Dept: PSYCHIATRY | Facility: CLINIC | Age: 67
End: 2022-12-30

## 2023-01-03 ENCOUNTER — APPOINTMENT (OUTPATIENT)
Dept: PSYCHIATRY | Facility: CLINIC | Age: 68
End: 2023-01-03

## 2023-01-06 ENCOUNTER — APPOINTMENT (OUTPATIENT)
Dept: PSYCHIATRY | Facility: CLINIC | Age: 68
End: 2023-01-06
Payer: MEDICARE

## 2023-01-06 PROCEDURE — 90834 PSYTX W PT 45 MINUTES: CPT | Mod: 95

## 2023-01-09 ENCOUNTER — APPOINTMENT (OUTPATIENT)
Dept: PSYCHIATRY | Facility: CLINIC | Age: 68
End: 2023-01-09

## 2023-01-09 NOTE — PLAN
[Supportive Therapy] : Supportive Therapy [de-identified] : Regi presented with a euthymic affect. 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 I provided supportive therapy and validated her feelings. She appeared to be receptive to my intervention and left the session in good emotional control.

## 2023-01-13 ENCOUNTER — APPOINTMENT (OUTPATIENT)
Dept: PSYCHIATRY | Facility: CLINIC | Age: 68
End: 2023-01-13

## 2023-01-17 ENCOUNTER — APPOINTMENT (OUTPATIENT)
Dept: PSYCHIATRY | Facility: CLINIC | Age: 68
End: 2023-01-17
Payer: MEDICARE

## 2023-01-17 PROCEDURE — 90847 FAMILY PSYTX W/PT 50 MIN: CPT | Mod: 95

## 2023-01-18 NOTE — PLAN
[Emotionally Focused Couples Therapy] : Emotionally Focused Couples Therapy [de-identified] : Patient reported that she has been able to be more intimate recently. Partner stated that he really enjoyed the increased intimacy, but felt disappointed when it didn't progress further. Patient expressed a need to take things slowly, while also acknowledging partners needs for sexual gratification. Emotionally focused therapy interventions used throughout the session. Patient and  were engaged and receptive to interventions used and ended the session in good emotional and behavioral control.  [FreeTextEntry1] : Weekly couples therapy

## 2023-01-18 NOTE — REASON FOR VISIT
[Telehealth (audio & video) - Couple/Family] : This visit was provided via telehealth using real-time 2-way audio visual technology.  [Other Location: e.g. Home (Enter Location, City,State)___] : The provider was located at [unfilled]. [Home] : The patient, [unfilled], was located at home, [unfilled], at the time of the visit. [Verbal consent obtained from patient/other participant(s)] : Verbal consent for telehealth/telephonic services obtained from patient/other participant(s) [FreeTextEntry4] : 6:30pm [FreeTextEntry5] : 7:15pm [Patient with collateral] : Patient with collateral  [Spouse] : spouse

## 2023-01-20 ENCOUNTER — APPOINTMENT (OUTPATIENT)
Dept: PSYCHIATRY | Facility: CLINIC | Age: 68
End: 2023-01-20
Payer: MEDICARE

## 2023-01-20 PROCEDURE — 90834 PSYTX W PT 45 MINUTES: CPT | Mod: 95

## 2023-01-23 ENCOUNTER — APPOINTMENT (OUTPATIENT)
Dept: PSYCHIATRY | Facility: CLINIC | Age: 68
End: 2023-01-23

## 2023-01-24 ENCOUNTER — APPOINTMENT (OUTPATIENT)
Dept: PSYCHIATRY | Facility: CLINIC | Age: 68
End: 2023-01-24
Payer: MEDICARE

## 2023-01-24 PROCEDURE — 90847 FAMILY PSYTX W/PT 50 MIN: CPT | Mod: 95

## 2023-01-25 NOTE — PLAN
[Emotionally Focused Couples Therapy] : Emotionally Focused Couples Therapy [de-identified] : Patient reported feeling triggered when her  criticized her after she got startled. Patient stated that she feels like her feelings are being minimized.  admitted that he sometimes reacts that way because he feels like the patient's reactions are exaggerated. Patient and  explored ways that they can better communicated when they feels triggered. Emotionally focused therapy interventions used throughout the session. Patient and  were engaged and receptive to interventions used and ended the session in good emotional and behavioral control.  [FreeTextEntry1] : Weekly couples therapy

## 2023-01-27 ENCOUNTER — APPOINTMENT (OUTPATIENT)
Dept: PSYCHIATRY | Facility: CLINIC | Age: 68
End: 2023-01-27
Payer: MEDICARE

## 2023-01-27 PROCEDURE — 90834 PSYTX W PT 45 MINUTES: CPT | Mod: 95

## 2023-01-27 NOTE — PLAN
[Supportive Therapy] : Supportive Therapy [de-identified] : Regi presented with a euthymic affect. She discussed feelings about her place of employment and how people perceive her. I provided supportive therapy and probed early dynamics in childhood that impact her interpersonal dynamics. She appeared to be receptive to my intervention and left the session in good emotional control.

## 2023-01-31 ENCOUNTER — APPOINTMENT (OUTPATIENT)
Dept: PSYCHIATRY | Facility: CLINIC | Age: 68
End: 2023-01-31
Payer: MEDICARE

## 2023-01-31 PROCEDURE — 90847 FAMILY PSYTX W/PT 50 MIN: CPT | Mod: 95

## 2023-01-31 NOTE — PLAN
[Supportive Therapy] : Supportive Therapy [de-identified] : Regi presented with a euthymic affect. She discussed her feelings about a concerning work interaction. I provided supportive therapy and traced it back to her early childhood dynamics. She appeared to be receptive to my intervention and left the session in good emotional control.

## 2023-02-01 NOTE — PLAN
[Emotionally Focused Couples Therapy] : Emotionally Focused Couples Therapy [de-identified] : Patient and  reported that they have been able to communicate better and provided a specific example over the past week. Patient and  discussed plans to move redesign there bedrooms and workspaces. Patient and  discussed ways in which they feel more connected with each other. Emotionally focused therapy interventions used throughout the session. Patient and  were engaged and receptive to interventions used and ended the session in good emotional and behavioral control.  [FreeTextEntry1] : Weekly couples therapy

## 2023-02-03 ENCOUNTER — APPOINTMENT (OUTPATIENT)
Dept: PSYCHIATRY | Facility: CLINIC | Age: 68
End: 2023-02-03
Payer: MEDICARE

## 2023-02-03 PROCEDURE — 90834 PSYTX W PT 45 MINUTES: CPT | Mod: 95

## 2023-02-03 NOTE — PLAN
[Supportive Therapy] : Supportive Therapy [de-identified] : Regi presented with a euthymic affect. She cried during parts of the session as she described her relationship with her parents and a previous spiritual leader. I provided supportive therapy and validated her feelings. She appeared to be receptive to my intervention and left the session in good emotional control.

## 2023-02-06 ENCOUNTER — APPOINTMENT (OUTPATIENT)
Dept: PSYCHIATRY | Facility: CLINIC | Age: 68
End: 2023-02-06

## 2023-02-07 ENCOUNTER — APPOINTMENT (OUTPATIENT)
Dept: PSYCHIATRY | Facility: CLINIC | Age: 68
End: 2023-02-07

## 2023-02-10 ENCOUNTER — APPOINTMENT (OUTPATIENT)
Dept: PSYCHIATRY | Facility: CLINIC | Age: 68
End: 2023-02-10
Payer: MEDICARE

## 2023-02-10 PROCEDURE — 90834 PSYTX W PT 45 MINUTES: CPT | Mod: 95

## 2023-02-16 NOTE — PLAN
[Supportive Therapy] : Supportive Therapy [de-identified] : Regi presented with a euthymic affect. She discussed her childhood trauma as a parentified child and the impact on her current relationships. I provided supportive therapy and validated her feelings. She appeared to be receptive to my intervention and left the session in good emotional control.

## 2023-02-17 ENCOUNTER — APPOINTMENT (OUTPATIENT)
Dept: PSYCHIATRY | Facility: CLINIC | Age: 68
End: 2023-02-17
Payer: MEDICARE

## 2023-02-17 PROCEDURE — 90834 PSYTX W PT 45 MINUTES: CPT | Mod: 95

## 2023-02-21 ENCOUNTER — APPOINTMENT (OUTPATIENT)
Dept: PSYCHIATRY | Facility: CLINIC | Age: 68
End: 2023-02-21

## 2023-02-21 ENCOUNTER — APPOINTMENT (OUTPATIENT)
Dept: PSYCHIATRY | Facility: CLINIC | Age: 68
End: 2023-02-21
Payer: MEDICARE

## 2023-02-21 PROCEDURE — 90847 FAMILY PSYTX W/PT 50 MIN: CPT | Mod: 95

## 2023-02-21 PROCEDURE — 99214 OFFICE O/P EST MOD 30 MIN: CPT | Mod: 95

## 2023-02-21 NOTE — REASON FOR VISIT
[Patient preference] : as per patient preference [Continuing, patient not seen in-person within last 12 months (provide details below)] : Telehealth services are continuing, patient not seen in-person within last 12 months.  [Telehealth (audio & video) - Individual/Group] : This visit was provided via telehealth using real-time 2-way audio visual technology. [Medical Office: ([facility])___] : The provider was located at the medical office in [unfilled]. [Home] : The patient, [unfilled], was located at home, [unfilled], at the time of the visit. [Verbal consent obtained from patient/other participant(s)] : Verbal consent for telehealth/telephonic services obtained from patient/other participant(s) [FreeTextEntry4] : 11:00a [FreeTextEntry5] : 11:30a [Patient] : Patient [FreeTextEntry1] : Psychiatric Follow Up

## 2023-02-21 NOTE — PLAN
[No] : No [Medication education provided] : Medication education provided. [Rationale for medication choices, possible risks/precautions, benefits, alternative treatment choices, and consequences of non-treatment discussed] : Rationale for medication choices, possible risks/precautions, benefits, alternative treatment choices, and consequences of non-treatment discussed with patient/family/caregiver  [Order(s) for medication placed in [facility] EHR] : Medication [FreeTextEntry4] : Discussion of relationship dynamics that result in anxiety [FreeTextEntry5] : #) Continue fluoxetine 80mg PO QD #) Continue propranolol 40mg PO BID PRN anxiety  #) Continue I/G therapy #) f/u in 1 month #) Call 911 or go to ER in case of emergency

## 2023-02-21 NOTE — SOCIAL HISTORY
[FreeTextEntry1] : Domiciled in [place] with  and the younger of two adopted daughters from [place] (older daughter is away at college).  Family history of alcohol use disorder.  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   [No Known Substance Use] : no known substance use

## 2023-02-21 NOTE — DISCUSSION/SUMMARY
[FreeTextEntry1] : 67 year old , domiciled with family, employed  woman with history of Borderline Personality Disorder, JAMESON, who continues to report affective instability, destabilization, tearfulness, and feelings of low self-worth in the setting of multiple psychosocial stressors.\par \par 30 mins spent: Psychoeducation, supportive counseling, prescribing meds, documentation. Discussed interpersonal challenges, career plans. Shared decision to continue fluoxetine and propranolol  at current doses given good tolerance and response. Patient is aware of risks vs benefits of medications and potential side effects. \par

## 2023-02-22 NOTE — PLAN
[Supportive Therapy] : Supportive Therapy [de-identified] : Regi presented with a euthymic affect. She discussed her feelings about her place of employment and interpersonal dynamics. She was able to connect it back to her own childhood. I provided supportive therapy and validated her feelings. She appeared to be receptive to my intervention and left the session in good emotional control.

## 2023-02-22 NOTE — REASON FOR VISIT
[Telehealth (audio & video) - Couple/Family] : This visit was provided via telehealth using real-time 2-way audio visual technology.  [Other Location: e.g. Home (Enter Location, City,State)___] : The provider was located at [unfilled]. [Home] : The patient, [unfilled], was located at home, [unfilled], at the time of the visit. [Verbal consent obtained from patient/other participant(s)] : Verbal consent for telehealth/telephonic services obtained from patient/other participant(s) [Patient with collateral] : Patient with collateral  [Spouse] : spouse [FreeTextEntry4] : 6:30pm [FreeTextEntry5] : 7:15pm

## 2023-02-22 NOTE — PLAN
[Emotionally Focused Couples Therapy] : Emotionally Focused Couples Therapy [de-identified] : Patient and  reported that they have been getting along well recently. Patient and  reported some anxiety related to the challenges their daughter was facing. Patient and  discussed ways to support her while also providing her with enough space to grow. Emotionally focused therapy interventions used throughout the session. Patient and  were engaged and receptive to interventions used and ended the session in good emotional and behavioral control.  [FreeTextEntry1] : Biweekly couples therapy

## 2023-02-24 ENCOUNTER — APPOINTMENT (OUTPATIENT)
Dept: PSYCHIATRY | Facility: CLINIC | Age: 68
End: 2023-02-24

## 2023-02-28 ENCOUNTER — NON-APPOINTMENT (OUTPATIENT)
Age: 68
End: 2023-02-28

## 2023-03-03 ENCOUNTER — APPOINTMENT (OUTPATIENT)
Dept: PSYCHIATRY | Facility: CLINIC | Age: 68
End: 2023-03-03
Payer: MEDICARE

## 2023-03-03 PROCEDURE — 90834 PSYTX W PT 45 MINUTES: CPT | Mod: 95

## 2023-03-06 ENCOUNTER — APPOINTMENT (OUTPATIENT)
Dept: PSYCHIATRY | Facility: CLINIC | Age: 68
End: 2023-03-06

## 2023-03-06 NOTE — PLAN
[Supportive Therapy] : Supportive Therapy [de-identified] : Regi presented with sadness. She reported feeling anxiety about her place of employment and feeling unappreciated. I provided supportive therapy and probed early origins of these feelings. She appeared to be receptive to my interventions and left  the session in good emotional control.

## 2023-03-07 ENCOUNTER — APPOINTMENT (OUTPATIENT)
Dept: PSYCHIATRY | Facility: CLINIC | Age: 68
End: 2023-03-07
Payer: MEDICARE

## 2023-03-07 ENCOUNTER — APPOINTMENT (OUTPATIENT)
Dept: PSYCHIATRY | Facility: CLINIC | Age: 68
End: 2023-03-07

## 2023-03-07 PROCEDURE — 90847 FAMILY PSYTX W/PT 50 MIN: CPT | Mod: 95

## 2023-03-08 NOTE — PLAN
[Emotionally Focused Couples Therapy] : Emotionally Focused Couples Therapy [de-identified] : Patient and  discussed the patient's need to change their bedroom to a different room of their apartment. Patient discussed how their current room can sometimes trigger memories of her childhood.  was receptive. Emotionally focused therapy interventions used throughout the session. Patient and  were engaged and receptive to interventions used and ended the session in good emotional and behavioral control.  [FreeTextEntry1] : Biweekly couples therapy

## 2023-03-14 ENCOUNTER — APPOINTMENT (OUTPATIENT)
Dept: PSYCHIATRY | Facility: CLINIC | Age: 68
End: 2023-03-14
Payer: MEDICARE

## 2023-03-14 PROCEDURE — 90834 PSYTX W PT 45 MINUTES: CPT | Mod: 95

## 2023-03-16 NOTE — PLAN
[Supportive Therapy] : Supportive Therapy [de-identified] : Regi and I met today instead of last Friday due to a scheduling conflict. She presented with sadness and cried most of the session She stated she had a miserable weekend due to feeling abandoned by her . I provided supportive therapy and validated her feelings. She appeared to be receptive to my intervention and left the session in good emotional control.

## 2023-03-17 ENCOUNTER — APPOINTMENT (OUTPATIENT)
Dept: PSYCHIATRY | Facility: CLINIC | Age: 68
End: 2023-03-17
Payer: MEDICARE

## 2023-03-17 PROCEDURE — 90834 PSYTX W PT 45 MINUTES: CPT | Mod: 95

## 2023-03-20 ENCOUNTER — APPOINTMENT (OUTPATIENT)
Dept: PSYCHIATRY | Facility: CLINIC | Age: 68
End: 2023-03-20

## 2023-03-21 ENCOUNTER — APPOINTMENT (OUTPATIENT)
Dept: PSYCHIATRY | Facility: CLINIC | Age: 68
End: 2023-03-21

## 2023-03-24 ENCOUNTER — APPOINTMENT (OUTPATIENT)
Dept: PSYCHIATRY | Facility: CLINIC | Age: 68
End: 2023-03-24
Payer: MEDICARE

## 2023-03-24 PROCEDURE — 90834 PSYTX W PT 45 MINUTES: CPT | Mod: 95

## 2023-03-27 NOTE — PLAN
[Supportive Therapy] : Supportive Therapy [de-identified] : Regi presented with a euthymic affect. She discussed interpersonal conflict at work and the feelings it stirred up for her. I provided supportive therapy and validated her feelings. She appeared to be receptive to my interventions and left the session in good emotional control.

## 2023-03-31 ENCOUNTER — APPOINTMENT (OUTPATIENT)
Dept: PSYCHIATRY | Facility: CLINIC | Age: 68
End: 2023-03-31
Payer: MEDICARE

## 2023-03-31 PROCEDURE — 90834 PSYTX W PT 45 MINUTES: CPT | Mod: 95

## 2023-03-31 NOTE — PLAN
[Supportive Therapy] : Supportive Therapy [de-identified] : Regi presented with a euthymic affect. She is recovering from Covid this week. She discussed feelings of dependance and what it stirs up for her. I provided supportive therapy and validated her feelings. She appeared to be receptive to my intervention and left the session in good emotional control.

## 2023-04-04 ENCOUNTER — APPOINTMENT (OUTPATIENT)
Dept: PSYCHIATRY | Facility: CLINIC | Age: 68
End: 2023-04-04

## 2023-04-04 ENCOUNTER — APPOINTMENT (OUTPATIENT)
Dept: PSYCHIATRY | Facility: CLINIC | Age: 68
End: 2023-04-04
Payer: MEDICARE

## 2023-04-04 PROCEDURE — 90847 FAMILY PSYTX W/PT 50 MIN: CPT | Mod: 95

## 2023-04-04 NOTE — PLAN
[Supportive Therapy] : Supportive Therapy [de-identified] : Regi presented with a euthymic affect. She is recovering from COVID and had time off from work. We processed feelings stirred up for her at work. She appeared to be receptive to my intervention and left the session in good emotional control.

## 2023-04-05 NOTE — PLAN
[Emotionally Focused Couples Therapy] : Emotionally Focused Couples Therapy [de-identified] : Patient and  explored there individual and relationship needs. Patient described need for a few hours of connection on the weekend.  described need for alone time to create artwork. Patient and  discussed how to accommodate both needs. Emotionally focused therapy interventions used throughout the session. Patient and  were engaged and receptive to interventions used and ended the session in good emotional and behavioral control.  [FreeTextEntry1] : Biweekly couples therapy

## 2023-04-07 ENCOUNTER — APPOINTMENT (OUTPATIENT)
Dept: PSYCHIATRY | Facility: CLINIC | Age: 68
End: 2023-04-07

## 2023-04-11 ENCOUNTER — APPOINTMENT (OUTPATIENT)
Dept: PSYCHIATRY | Facility: CLINIC | Age: 68
End: 2023-04-11
Payer: MEDICARE

## 2023-04-11 PROCEDURE — 99214 OFFICE O/P EST MOD 30 MIN: CPT | Mod: 95

## 2023-04-11 NOTE — SOCIAL HISTORY
[No Known Substance Use] : no known substance use [FreeTextEntry1] : Domiciled in [place] with  and the younger of two adopted daughters from [place] (older daughter is away at college).  Family history of alcohol use disorder.  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

## 2023-04-11 NOTE — REASON FOR VISIT
[Patient preference] : as per patient preference [Continuing, patient not seen in-person within last 12 months (provide details below)] : Telehealth services are continuing, patient not seen in-person within last 12 months.  [Telehealth (audio & video) - Individual/Group] : This visit was provided via telehealth using real-time 2-way audio visual technology. [Medical Office: ([facility])___] : The provider was located at the medical office in [unfilled]. [Home] : The patient, [unfilled], was located at home, [unfilled], at the time of the visit. [Verbal consent obtained from patient/other participant(s)] : Verbal consent for telehealth/telephonic services obtained from patient/other participant(s) [Patient] : Patient [FreeTextEntry4] : 11:00a [FreeTextEntry5] : 11:30a [FreeTextEntry1] : Psychiatric Follow Up

## 2023-04-11 NOTE — PLAN
[No] : No [Medication education provided] : Medication education provided. [Rationale for medication choices, possible risks/precautions, benefits, alternative treatment choices, and consequences of non-treatment discussed] : Rationale for medication choices, possible risks/precautions, benefits, alternative treatment choices, and consequences of non-treatment discussed with patient/family/caregiver  [Order(s) for medication placed in [facility] EHR] : Medication [FreeTextEntry4] : Discussion of relationship dynamics that result in anxiety [FreeTextEntry5] : #) Continue fluoxetine 80mg PO QD #) Continue propranolol 40mg PO BID PRN anxiety  #) Continue I/G therapy #) f/u in 1 month #) Call 911 or go to ER in case of emergency

## 2023-04-11 NOTE — HISTORY OF PRESENT ILLNESS
[Not Applicable] : Not applicable [FreeTextEntry1] : Patient is seen and evaluated. Interval history reviewed. 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 The job will conclude at the end of May when the other teacher returns from maternity leave. Regi is uncertain about her plans for the Summer and Fall. She continues to struggle to manage emotions and distress since d/c of clonazepam over a year ago but is able to appreciate her progress. She recently went to [place] to visit her daughter - she worries about her daughter as she suspects she may be depressed and lonely and notes these are issues she has struggled with through her life.  [FreeTextEntry2] : See PPHx [FreeTextEntry3] : See PPHx

## 2023-04-11 NOTE — PHYSICAL EXAM
[Average] : average [Cooperative] : cooperative [Anxious] : anxious [Labile] : labile [Clear] : clear [Linear/Goal Directed] : linear/goal directed [WNL] : within normal limits [de-identified] : at times tearful

## 2023-04-14 ENCOUNTER — APPOINTMENT (OUTPATIENT)
Dept: PSYCHIATRY | Facility: CLINIC | Age: 68
End: 2023-04-14

## 2023-04-18 ENCOUNTER — APPOINTMENT (OUTPATIENT)
Dept: PSYCHIATRY | Facility: CLINIC | Age: 68
End: 2023-04-18
Payer: MEDICARE

## 2023-04-18 ENCOUNTER — APPOINTMENT (OUTPATIENT)
Dept: PSYCHIATRY | Facility: CLINIC | Age: 68
End: 2023-04-18

## 2023-04-18 PROCEDURE — 90847 FAMILY PSYTX W/PT 50 MIN: CPT | Mod: 95

## 2023-04-19 NOTE — REASON FOR VISIT
[Patient preference] : as per patient preference [Telehealth (audio & video) - Couple/Family] : This visit was provided via telehealth using real-time 2-way audio visual technology.  [Other Location: e.g. Home (Enter Location, City,State)___] : The provider was located at [unfilled]. [Home] : The patient, [unfilled], was located at home, [unfilled], at the time of the visit. [Verbal consent obtained from patient/other participant(s)] : Verbal consent for telehealth/telephonic services obtained from patient/other participant(s) [FreeTextEntry4] : 6:30pm [FreeTextEntry5] : 7:15pm [FreeTextEntry1] : James Del Rio [Patient with collateral] : Patient with collateral  [Spouse] : spouse

## 2023-04-19 NOTE — PLAN
[Emotionally Focused Couples Therapy] : Emotionally Focused Couples Therapy [de-identified] : Patient and  described a recent incident where they did not communicate their needs, which led to an escalation of conflict. Patient and  explored how they could handle it differently in the future. Emotionally focused therapy interventions used throughout the session. Patient and  were engaged and receptive to interventions used and ended the session in good emotional and behavioral control.  [FreeTextEntry1] : Biweekly couples therapy

## 2023-04-21 ENCOUNTER — APPOINTMENT (OUTPATIENT)
Dept: PSYCHIATRY | Facility: CLINIC | Age: 68
End: 2023-04-21
Payer: MEDICARE

## 2023-04-21 PROCEDURE — 90834 PSYTX W PT 45 MINUTES: CPT | Mod: 95

## 2023-04-26 NOTE — END OF VISIT
[Duration of Psychotherapy Visit (minutes spent in synchronous communication): ____] : Duration of Psychotherapy Visit (minutes spent in synchronous communication): [unfilled] [Individual Psychotherapy for 38-52 minutes] : Individual Psychotherapy for 38 - 52 minutes [Teletherapy Service Provided] : The services provided in this session were delivered via tele-therapy [FreeTextEntry3] : home [FreeTextEntry5] : PIPER Nichoslon

## 2023-04-26 NOTE — PLAN
[de-identified] : Regi presented with a euthymic affect. She discussed interpersonal conflict at work and feelings it stirred up for her. I provided supportive therapy and validated her feelings. She left the session in good emotional control.  [Supportive Therapy] : Supportive Therapy

## 2023-04-26 NOTE — REASON FOR VISIT
[Home] : at home, [unfilled] , at the time of the visit. [Other Location: e.g. Home (Enter Location, City,State)___] : at [unfilled] [Self] : self [Patient] : the patient

## 2023-04-28 ENCOUNTER — APPOINTMENT (OUTPATIENT)
Dept: PSYCHIATRY | Facility: CLINIC | Age: 68
End: 2023-04-28
Payer: MEDICARE

## 2023-04-28 PROCEDURE — 90834 PSYTX W PT 45 MINUTES: CPT | Mod: 95

## 2023-05-01 NOTE — REASON FOR VISIT
[Other Location: e.g. Home (Enter Location, City,State)___] : at [unfilled] [Home] : at home, [unfilled] , at the time of the visit. [Patient] : the patient [Self] : self

## 2023-05-01 NOTE — PLAN
[de-identified] : Regi presented with a euthymic affect. She discussed interpersonal conflict at work and feelings it stirred up for her. I provided supportive therapy and validated her feelings. We probed early relational dynamics with her mother and the impact on her attachment style. She left the session in good emotional control.  [Supportive Therapy] : Supportive Therapy

## 2023-05-02 ENCOUNTER — APPOINTMENT (OUTPATIENT)
Dept: PSYCHIATRY | Facility: CLINIC | Age: 68
End: 2023-05-02
Payer: MEDICARE

## 2023-05-02 ENCOUNTER — APPOINTMENT (OUTPATIENT)
Dept: PSYCHIATRY | Facility: CLINIC | Age: 68
End: 2023-05-02

## 2023-05-02 PROCEDURE — 90847 FAMILY PSYTX W/PT 50 MIN: CPT | Mod: 95

## 2023-05-03 NOTE — PLAN
[Emotionally Focused Couples Therapy] : Emotionally Focused Couples Therapy [de-identified] : Patient and  discussed their difficulty planning things. Patient and  worked together to schedule when they planning to move their daughter back into their apartment and switch bedrooms with her. Emotionally focused therapy interventions used throughout the session. Patient and  were engaged and receptive to interventions used and ended the session in good emotional and behavioral control.  [FreeTextEntry1] : Biweekly couples therapy

## 2023-05-03 NOTE — END OF VISIT
[Duration of Psychotherapy Visit (minutes spent in synchronous communication): ____] : Duration of Psychotherapy Visit (minutes spent in synchronous communication): [unfilled] [Family Therapy With Client Present] : Family Therapy With Client Present  [Teletherapy Service Provided] : The services provided in this session were delivered via tele-therapy [After Hours] : After Hours [FreeTextEntry3] : Home [FreeTextEntry5] : Home [FreeTextEntry6] :  was present and a participant in couples counseling

## 2023-05-03 NOTE — REASON FOR VISIT
[Patient preference] : as per patient preference [Telehealth (audio & video) - Couple/Family] : This visit was provided via telehealth using real-time 2-way audio visual technology.  [Other Location: e.g. Home (Enter Location, City,State)___] : The provider was located at [unfilled]. [Home] : The patient, [unfilled], was located at home, [unfilled], at the time of the visit. [Verbal consent obtained from patient/other participant(s)] : Verbal consent for telehealth/telephonic services obtained from patient/other participant(s) [Patient with collateral] : Patient with collateral  [Spouse] : spouse [FreeTextEntry4] : 6:30pm [FreeTextEntry5] : 7:15pm [FreeTextEntry1] : James Del Rio

## 2023-05-05 ENCOUNTER — APPOINTMENT (OUTPATIENT)
Dept: PSYCHIATRY | Facility: CLINIC | Age: 68
End: 2023-05-05

## 2023-05-12 ENCOUNTER — APPOINTMENT (OUTPATIENT)
Dept: PSYCHIATRY | Facility: CLINIC | Age: 68
End: 2023-05-12
Payer: MEDICARE

## 2023-05-12 PROCEDURE — 90834 PSYTX W PT 45 MINUTES: CPT | Mod: 95

## 2023-05-15 NOTE — PLAN
[Supportive Therapy] : Supportive Therapy [de-identified] : Regi presented with sadness. She discussed her feelings about her employment and what it stirs up for her. I provided supportive therapy and validated her feelings. She left the session in good emotional control.

## 2023-05-16 ENCOUNTER — APPOINTMENT (OUTPATIENT)
Dept: PSYCHIATRY | Facility: CLINIC | Age: 68
End: 2023-05-16

## 2023-05-19 ENCOUNTER — APPOINTMENT (OUTPATIENT)
Dept: PSYCHIATRY | Facility: CLINIC | Age: 68
End: 2023-05-19

## 2023-05-26 ENCOUNTER — APPOINTMENT (OUTPATIENT)
Dept: PSYCHIATRY | Facility: CLINIC | Age: 68
End: 2023-05-26
Payer: MEDICARE

## 2023-05-26 PROCEDURE — 90834 PSYTX W PT 45 MINUTES: CPT | Mod: 95

## 2023-05-30 ENCOUNTER — APPOINTMENT (OUTPATIENT)
Dept: PSYCHIATRY | Facility: CLINIC | Age: 68
End: 2023-05-30

## 2023-05-30 ENCOUNTER — APPOINTMENT (OUTPATIENT)
Dept: PSYCHIATRY | Facility: CLINIC | Age: 68
End: 2023-05-30
Payer: MEDICARE

## 2023-05-30 PROCEDURE — 90847 FAMILY PSYTX W/PT 50 MIN: CPT | Mod: 95

## 2023-05-30 PROCEDURE — 99214 OFFICE O/P EST MOD 30 MIN: CPT | Mod: 95

## 2023-05-30 NOTE — REASON FOR VISIT
[Patient preference] : as per patient preference [Continuing, patient not seen in-person within last 12 months (provide details below)] : Telehealth services are continuing, patient not seen in-person within last 12 months.  [Telehealth (audio & video) - Individual/Group] : This visit was provided via telehealth using real-time 2-way audio visual technology. [Medical Office: ([facility])___] : The provider was located at the medical office in [unfilled]. [Home] : The patient, [unfilled], was located at home, [unfilled], at the time of the visit. [Verbal consent obtained from patient/other participant(s)] : Verbal consent for telehealth/telephonic services obtained from patient/other participant(s) [FreeTextEntry4] : 11:00a [FreeTextEntry5] : 11:30a [Patient] : Patient [FreeTextEntry1] : Psychiatric Follow Up

## 2023-05-30 NOTE — SOCIAL HISTORY
[FreeTextEntry1] : Domiciled in [place] with  and the younger of two adopted daughters from [place] (older daughter is away at college).  Family history of alcohol use disorder.  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   [No Known Substance Use] : no known substance use

## 2023-05-30 NOTE — HISTORY OF PRESENT ILLNESS
[FreeTextEntry1] : Patient is seen and evaluated. Interval history reviewed. 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 Reports more recent memory struggles. She continues to struggle to manage emotions and distress since d/c of clonazepam over a year ago but is able to appreciate her progress.  [Not Applicable] : Not applicable [FreeTextEntry2] : See PPHx [FreeTextEntry3] : See PPHx

## 2023-05-30 NOTE — PHYSICAL EXAM
[Average] : average [Cooperative] : cooperative [Anxious] : anxious [Labile] : labile [Clear] : clear [Linear/Goal Directed] : linear/goal directed [WNL] : within normal limits [de-identified] : at times tearful

## 2023-05-30 NOTE — PLAN
[No] : No [Medication education provided] : Medication education provided. [Rationale for medication choices, possible risks/precautions, benefits, alternative treatment choices, and consequences of non-treatment discussed] : Rationale for medication choices, possible risks/precautions, benefits, alternative treatment choices, and consequences of non-treatment discussed with patient/family/caregiver  [Order(s) for medication placed in [facility] EHR] : Medication [FreeTextEntry4] : Discussion of relationship dynamics that result in anxiety [FreeTextEntry5] : #) Continue fluoxetine 80mg PO QDaily #) Continue propranolol 40mg PO BID PRN anxiety  #) Continue I/G therapy #) f/u in 1 month #) Call 911 or go to ER in case of emergency

## 2023-05-31 NOTE — PLAN
[Emotionally Focused Couples Therapy] : Emotionally Focused Couples Therapy [de-identified] : Patient and  discussed recent traumatic events that their daughter had experienced. Patient and  discussed ways to best support their daughter during this time. Emotionally focused therapy interventions used throughout the session. Patient and  were engaged and receptive to interventions used and ended the session in good emotional and behavioral control.  [FreeTextEntry1] : Biweekly couples therapy

## 2023-05-31 NOTE — PLAN
[Supportive Therapy] : Supportive Therapy [de-identified] : Regi presented with a euthymic affect. 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 I provided supportive therapy and validated her feelings. She left the session in good emotional control.

## 2023-06-01 ENCOUNTER — OUTPATIENT (OUTPATIENT)
Dept: OUTPATIENT SERVICES | Facility: HOSPITAL | Age: 68
LOS: 1 days | Discharge: HOME | End: 2023-06-01

## 2023-06-02 ENCOUNTER — APPOINTMENT (OUTPATIENT)
Dept: PSYCHIATRY | Facility: CLINIC | Age: 68
End: 2023-06-02
Payer: MEDICARE

## 2023-06-02 PROCEDURE — 90834 PSYTX W PT 45 MINUTES: CPT | Mod: 95

## 2023-06-09 ENCOUNTER — APPOINTMENT (OUTPATIENT)
Dept: PSYCHIATRY | Facility: CLINIC | Age: 68
End: 2023-06-09
Payer: MEDICARE

## 2023-06-09 PROCEDURE — 90834 PSYTX W PT 45 MINUTES: CPT | Mod: 95

## 2023-06-12 NOTE — PLAN
[Supportive Therapy] : Supportive Therapy [de-identified] : Regi presented with a euthymic affect. We discussed feelings about her previous place of employment and feelings it stirred up for her. We also discussed my departure from [facility]. I provided supportive therapy and validated her feelings. She left the session in good emotional control.

## 2023-06-13 ENCOUNTER — APPOINTMENT (OUTPATIENT)
Dept: PSYCHIATRY | Facility: CLINIC | Age: 68
End: 2023-06-13

## 2023-06-15 ENCOUNTER — APPOINTMENT (OUTPATIENT)
Dept: PSYCHIATRY | Facility: CLINIC | Age: 68
End: 2023-06-15
Payer: MEDICARE

## 2023-06-15 PROCEDURE — 90834 PSYTX W PT 45 MINUTES: CPT | Mod: 59,95

## 2023-06-15 PROCEDURE — 90847 FAMILY PSYTX W/PT 50 MIN: CPT | Mod: 95

## 2023-06-15 NOTE — PLAN
[Supportive Therapy] : Supportive Therapy [de-identified] : Regi presented with a euthymic affect. She discussed conflict at her former place of employment and what it stirred up for her. I provided supportive therapy and validated her feelings. She left the session in good emotional control.
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
## 2023-06-19 NOTE — PLAN
[Emotionally Focused Couples Therapy] : Emotionally Focused Couples Therapy [de-identified] : Patient and  discussed the stress related to rearranging their apartment. Emotionally focused therapy interventions used throughout the session. Patient and  were engaged and receptive to interventions used and ended the session in good emotional and behavioral control.  [FreeTextEntry1] : Biweekly couples therapy

## 2023-06-20 ENCOUNTER — APPOINTMENT (OUTPATIENT)
Dept: PSYCHIATRY | Facility: CLINIC | Age: 68
End: 2023-06-20
Payer: MEDICARE

## 2023-06-20 PROCEDURE — 90834 PSYTX W PT 45 MINUTES: CPT | Mod: 95

## 2023-06-20 NOTE — END OF VISIT
[Duration of Psychotherapy Visit (minutes spent in synchronous communication): ____] : Duration of Psychotherapy Visit (minutes spent in synchronous communication): [unfilled] [Individual Psychotherapy for 38-52 minutes] : Individual Psychotherapy for 38 - 52 minutes [Teletherapy Service Provided] : The services provided in this session were delivered via tele-therapy [FreeTextEntry5] : PIPER Nicholson [FreeTextEntry3] : home

## 2023-06-20 NOTE — PLAN
[Supportive Therapy] : Supportive Therapy [de-identified] : Regi presented with sadness. She discussed early childhood memories of invalidation and feelings it stirred up for her. We processed ending our work together. I validated her feelings and she left the session in good emotional control.

## 2023-06-23 ENCOUNTER — APPOINTMENT (OUTPATIENT)
Dept: PSYCHIATRY | Facility: CLINIC | Age: 68
End: 2023-06-23
Payer: MEDICARE

## 2023-06-23 PROCEDURE — 90834 PSYTX W PT 45 MINUTES: CPT | Mod: 95

## 2023-06-26 NOTE — PLAN
[Supportive Therapy] : Supportive Therapy [de-identified] : Regi presented with a euthymic affect. She discussed her feelings about a new therapist and experiences of past trauma. I provided supportive therapy and validated her feelings. She left the session in good emotional control.

## 2023-06-27 ENCOUNTER — APPOINTMENT (OUTPATIENT)
Dept: PSYCHIATRY | Facility: CLINIC | Age: 68
End: 2023-06-27

## 2023-06-27 ENCOUNTER — APPOINTMENT (OUTPATIENT)
Dept: PSYCHIATRY | Facility: CLINIC | Age: 68
End: 2023-06-27
Payer: MEDICARE

## 2023-06-27 PROCEDURE — 90846 FAMILY PSYTX W/O PT 50 MIN: CPT | Mod: 95

## 2023-06-28 NOTE — PLAN
[Emotionally Focused Couples Therapy] : Emotionally Focused Couples Therapy [de-identified] : Patient and  explored the challenges to their relationship dynamic brought on by renovating their apartment. Emotionally focused therapy interventions used throughout the session. Patient and  were engaged and receptive to interventions used and ended the session in good emotional and behavioral control.  [FreeTextEntry1] : Biweekly couples therapy

## 2023-06-30 ENCOUNTER — APPOINTMENT (OUTPATIENT)
Dept: PSYCHIATRY | Facility: CLINIC | Age: 68
End: 2023-06-30

## 2023-07-03 NOTE — PLAN
[Supportive Therapy] : Supportive Therapy [de-identified] : Regi presented with a euthymic affect. We continued to process my departure from [facility]. We discussed other losses in her life and feelings it stirs up for her. I provided supportive therapy and she left the session in good emotional control.

## 2023-07-06 ENCOUNTER — APPOINTMENT (OUTPATIENT)
Dept: PSYCHIATRY | Facility: CLINIC | Age: 68
End: 2023-07-06
Payer: MEDICARE

## 2023-07-06 PROCEDURE — 90834 PSYTX W PT 45 MINUTES: CPT | Mod: 95

## 2023-07-12 NOTE — PLAN
[Supportive Therapy] : Supportive Therapy [de-identified] : Oscar presented with sadness. She teared up as she discussed interpersonal conflict with her  and daughter. We processed the feelings it stirred up for her and made connections to early childhood. I normalized her reactions and conveyed empathy. She left the session in good emotional control.

## 2023-07-13 ENCOUNTER — APPOINTMENT (OUTPATIENT)
Dept: PSYCHIATRY | Facility: CLINIC | Age: 68
End: 2023-07-13
Payer: MEDICARE

## 2023-07-13 PROCEDURE — 90834 PSYTX W PT 45 MINUTES: CPT | Mod: 95
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
## 2023-07-14 NOTE — DISCUSSION/SUMMARY
[Plan Review] : Plan Review [Attempting to realize their potential] : Attempting to realize their potential [Motivated to participate in treatment] : motivated to participate in treatment [FreeTextEntry2] : 8/23 [FreeTextEntry5] : "Stop overthinking."  [Mental Health] : Mental Health [Continued - Progress made] : Continued - Progress made: [___ times a week] : [unfilled] times a week [every ___ weeks] : every [unfilled] weeks [Improvement in symptoms as evidenced by:] : Improvement in symptoms as evidenced by: [Yes] : Yes [FreeTextEntry1] : eRgi experiences excessive worry; particularly in interpersonal relationships [de-identified] : Regi will be able to identify two relationship dynamics that cause anxiety [de-identified] : 8/23 [de-identified] : This goal will continue as Regi is able to identify not receiving immediate feedback from someone as a cause to anxiety [de-identified] : Regi will be able to identify at least early childhood dynamics that lead to anxiety  [de-identified] : 8/23 [de-identified] : Regi is able to identify feeling invalidated by her mother as activating anxiety [de-identified] : Dr. Adams [de-identified] : Jocelynn Bahena, MICHAELW  [de-identified] : Couples with Kenn Palomino LCSW  [de-identified] : Decreased frequency of anxiety

## 2023-07-25 ENCOUNTER — APPOINTMENT (OUTPATIENT)
Dept: PSYCHIATRY | Facility: CLINIC | Age: 68
End: 2023-07-25
Payer: MEDICARE

## 2023-07-25 PROCEDURE — 90847 FAMILY PSYTX W/PT 50 MIN: CPT | Mod: 95

## 2023-07-26 NOTE — PLAN
[Emotionally Focused Couples Therapy] : Emotionally Focused Couples Therapy [de-identified] : Patient and  described a recent conflict which centered around paying for their daughter's education. Emotionally focused therapy interventions used throughout the session. Patient and  were engaged and receptive to interventions used and ended the session in good emotional and behavioral control.  [FreeTextEntry1] : Biweekly couples therapy

## 2023-08-01 ENCOUNTER — APPOINTMENT (OUTPATIENT)
Dept: PSYCHIATRY | Facility: CLINIC | Age: 68
End: 2023-08-01
Payer: MEDICARE

## 2023-08-01 PROCEDURE — 99214 OFFICE O/P EST MOD 30 MIN: CPT | Mod: 95

## 2023-08-02 ENCOUNTER — APPOINTMENT (OUTPATIENT)
Dept: PSYCHIATRY | Facility: CLINIC | Age: 68
End: 2023-08-02
Payer: MEDICARE

## 2023-08-02 PROCEDURE — 90853 GROUP PSYCHOTHERAPY: CPT | Mod: 95

## 2023-08-03 NOTE — REASON FOR VISIT
[Access issues (e.g., transportation, impaired mobility, etc.)] : due to patient's access issues [Telehealth (audio & video) - Individual/Group] : This visit was provided via telehealth using real-time 2-way audio visual technology. [Other Location: e.g. Home (Enter Location, City,State)___] : The provider was located at [unfilled]. [Home] : The patient, [unfilled], was located at home, [unfilled], at the time of the visit. [Verbal consent obtained from patient/other participant(s)] : Verbal consent for telehealth/telephonic services obtained from patient/other participant(s) [FreeTextEntry4] : 1:00PM [FreeTextEntry5] : 2:00 PM

## 2023-08-03 NOTE — DISCUSSION/SUMMARY
[Once a week] : once a week [60 minutes] : 60 minutes [de-identified] :  Step by Step Healing - STAIR Narrative Therapy for women who are survivors of trauma. [FreeTextEntry8] : Introductions, psychoeducation (trauma, STAIR model), reviewed focused breathing skill. [FreeTextEntry4] : Pt presented as engaged and motivated for tx. Was an active participant and demonstrated progress toward group goals (acquired focused breathing skill).

## 2023-08-03 NOTE — END OF VISIT
[Licensed Clinician] : Licensed Clinician [FreeTextEntry1] : Pt plans to attend weekly group psychotherapy sessions, with next session scheduled for next Wednesday at 1:00pm.

## 2023-08-08 ENCOUNTER — APPOINTMENT (OUTPATIENT)
Dept: PSYCHIATRY | Facility: CLINIC | Age: 68
End: 2023-08-08
Payer: MEDICARE

## 2023-08-08 PROCEDURE — 90847 FAMILY PSYTX W/PT 50 MIN: CPT | Mod: 95

## 2023-08-09 ENCOUNTER — APPOINTMENT (OUTPATIENT)
Dept: PSYCHIATRY | Facility: CLINIC | Age: 68
End: 2023-08-09
Payer: MEDICARE

## 2023-08-09 PROCEDURE — 90853 GROUP PSYCHOTHERAPY: CPT | Mod: 95

## 2023-08-09 NOTE — END OF VISIT
[FreeTextEntry1] : Pt plans to attend weekly group psychotherapy sessions, with next session scheduled for next Wednesday at 1:00pm. [Licensed Clinician] : Licensed Clinician

## 2023-08-09 NOTE — DISCUSSION/SUMMARY
[Once a week] : once a week [60 minutes] : 60 minutes [de-identified] :  Step by Step Healing - STAIR Narrative Therapy for women who are survivors of trauma. [FreeTextEntry8] : Skill practice review from last week (focused breathing), psychoeducation on emotion regulation, exploring pt's approach to emotional expression, and skill building (emotion monitoring).   [FreeTextEntry4] : Pt presented as engaged and motivated for tx. Was an active participant and demonstrated progress toward skill acquisition (emotion monitoring).

## 2023-08-11 DIAGNOSIS — F43.10 POST-TRAUMATIC STRESS DISORDER, UNSPECIFIED: ICD-10-CM

## 2023-08-14 NOTE — PLAN
[Emotionally Focused Couples Therapy] : Emotionally Focused Couples Therapy [de-identified] : Patient and  described obstacles they are facing trying to get a loan for their child's education. Emotionally focused therapy interventions used throughout the session. Patient and  were engaged and receptive to interventions used and ended the session in good emotional and behavioral control.  [FreeTextEntry1] : Biweekly couples therapy

## 2023-08-16 ENCOUNTER — APPOINTMENT (OUTPATIENT)
Dept: PSYCHIATRY | Facility: CLINIC | Age: 68
End: 2023-08-16
Payer: MEDICARE

## 2023-08-16 PROCEDURE — 90853 GROUP PSYCHOTHERAPY: CPT | Mod: 95

## 2023-08-16 NOTE — DISCUSSION/SUMMARY
[Once a week] : once a week [60 minutes] : 60 minutes [de-identified] :  Step by Step Healing - STAIR Narrative Therapy for women who are survivors of trauma. [FreeTextEntry8] : Skill practice review from last week (emotion logging), psychoeducation on emotion regulation, exploring pt's approach to emotional expression, and skill building (soothing the five senses).   [FreeTextEntry4] : Pt presented as engaged and motivated for tx. Was an active participant and demonstrated progress toward skill acquisition (soothing the five senses).

## 2023-08-22 ENCOUNTER — APPOINTMENT (OUTPATIENT)
Dept: PSYCHIATRY | Facility: CLINIC | Age: 68
End: 2023-08-22

## 2023-08-23 ENCOUNTER — APPOINTMENT (OUTPATIENT)
Dept: PSYCHIATRY | Facility: CLINIC | Age: 68
End: 2023-08-23
Payer: MEDICARE

## 2023-08-23 PROCEDURE — 90853 GROUP PSYCHOTHERAPY: CPT | Mod: 95

## 2023-08-24 NOTE — DISCUSSION/SUMMARY
[Once a week] : once a week [60 minutes] : 60 minutes [de-identified] :  Step by Step Healing - STAIR Narrative Therapy for women who are survivors of trauma. [FreeTextEntry8] : Skill practice review from last week (five senses), psychoeducation on body channel, exploring pt's approach to emotions held in the body, and skill building (body based breathing exercise).   [FreeTextEntry4] : Pt presented as engaged and motivated for tx. Was an active participant and demonstrated progress toward skill acquisition (body based breathing exercise).

## 2023-08-30 ENCOUNTER — APPOINTMENT (OUTPATIENT)
Dept: PSYCHIATRY | Facility: CLINIC | Age: 68
End: 2023-08-30

## 2023-09-05 ENCOUNTER — APPOINTMENT (OUTPATIENT)
Dept: PSYCHIATRY | Facility: CLINIC | Age: 68
End: 2023-09-05
Payer: MEDICARE

## 2023-09-05 PROCEDURE — 90847 FAMILY PSYTX W/PT 50 MIN: CPT | Mod: 95

## 2023-09-06 ENCOUNTER — APPOINTMENT (OUTPATIENT)
Dept: PSYCHIATRY | Facility: CLINIC | Age: 68
End: 2023-09-06
Payer: MEDICARE

## 2023-09-06 PROCEDURE — 90853 GROUP PSYCHOTHERAPY: CPT | Mod: 95

## 2023-09-06 NOTE — PLAN
[Emotionally Focused Couples Therapy] : Emotionally Focused Couples Therapy [de-identified] : Patient and  discussed a birthday party celebration the patient threw for her .  expressed anxiety related to retiring. Patient and  explored impact on their relationship . Emotionally focused therapy interventions used throughout the session. Patient and  were engaged and receptive to interventions used and ended the session in good emotional and behavioral control.  [FreeTextEntry1] : Biweekly couples therapy

## 2023-09-07 NOTE — DISCUSSION/SUMMARY
[Once a week] : once a week [60 minutes] : 60 minutes [de-identified] :  Step by Step Healing - STAIR Narrative Therapy for women who are survivors of trauma. [FreeTextEntry8] : Skill practice review from last week (emotion surfing), psychoeducation on distress tolerance, discussion on rationale for distress tolerance, and skill building (STOP skill).   [FreeTextEntry4] : Pt presented as engaged and motivated for tx. Was an active participant and demonstrated progress toward skill acquisition (STOP skill).

## 2023-09-13 ENCOUNTER — APPOINTMENT (OUTPATIENT)
Dept: PSYCHIATRY | Facility: CLINIC | Age: 68
End: 2023-09-13

## 2023-09-19 ENCOUNTER — APPOINTMENT (OUTPATIENT)
Dept: PSYCHIATRY | Facility: CLINIC | Age: 68
End: 2023-09-19
Payer: MEDICARE

## 2023-09-19 PROCEDURE — 90847 FAMILY PSYTX W/PT 50 MIN: CPT | Mod: 95

## 2023-09-20 ENCOUNTER — APPOINTMENT (OUTPATIENT)
Dept: PSYCHIATRY | Facility: CLINIC | Age: 68
End: 2023-09-20
Payer: MEDICARE

## 2023-09-20 PROCEDURE — 90853 GROUP PSYCHOTHERAPY: CPT | Mod: 95

## 2023-09-27 ENCOUNTER — APPOINTMENT (OUTPATIENT)
Dept: PSYCHIATRY | Facility: CLINIC | Age: 68
End: 2023-09-27
Payer: MEDICARE

## 2023-09-27 PROCEDURE — 90853 GROUP PSYCHOTHERAPY: CPT | Mod: 95

## 2023-10-03 ENCOUNTER — APPOINTMENT (OUTPATIENT)
Dept: PSYCHIATRY | Facility: CLINIC | Age: 68
End: 2023-10-03
Payer: MEDICARE

## 2023-10-03 PROCEDURE — 99214 OFFICE O/P EST MOD 30 MIN: CPT | Mod: 95

## 2023-10-04 ENCOUNTER — APPOINTMENT (OUTPATIENT)
Dept: PSYCHIATRY | Facility: CLINIC | Age: 68
End: 2023-10-04
Payer: MEDICARE

## 2023-10-04 ENCOUNTER — NON-APPOINTMENT (OUTPATIENT)
Age: 68
End: 2023-10-04

## 2023-10-04 PROCEDURE — 90853 GROUP PSYCHOTHERAPY: CPT | Mod: 95

## 2023-10-10 ENCOUNTER — APPOINTMENT (OUTPATIENT)
Dept: PSYCHIATRY | Facility: CLINIC | Age: 68
End: 2023-10-10
Payer: MEDICARE

## 2023-10-10 PROCEDURE — 90834 PSYTX W PT 45 MINUTES: CPT | Mod: 95

## 2023-10-11 ENCOUNTER — APPOINTMENT (OUTPATIENT)
Dept: PSYCHIATRY | Facility: CLINIC | Age: 68
End: 2023-10-11

## 2023-10-17 ENCOUNTER — APPOINTMENT (OUTPATIENT)
Dept: PSYCHIATRY | Facility: CLINIC | Age: 68
End: 2023-10-17
Payer: COMMERCIAL

## 2023-10-17 PROCEDURE — 90834 PSYTX W PT 45 MINUTES: CPT | Mod: 95

## 2023-10-17 PROCEDURE — 90847 FAMILY PSYTX W/PT 50 MIN: CPT | Mod: 95

## 2023-10-18 ENCOUNTER — APPOINTMENT (OUTPATIENT)
Dept: PSYCHIATRY | Facility: CLINIC | Age: 68
End: 2023-10-18
Payer: MEDICARE

## 2023-10-18 PROCEDURE — 90853 GROUP PSYCHOTHERAPY: CPT | Mod: 95

## 2023-10-24 ENCOUNTER — APPOINTMENT (OUTPATIENT)
Dept: PSYCHIATRY | Facility: CLINIC | Age: 68
End: 2023-10-24
Payer: MEDICARE

## 2023-10-24 PROCEDURE — 90834 PSYTX W PT 45 MINUTES: CPT | Mod: 95

## 2023-10-31 ENCOUNTER — APPOINTMENT (OUTPATIENT)
Dept: PSYCHIATRY | Facility: CLINIC | Age: 68
End: 2023-10-31
Payer: MEDICARE

## 2023-10-31 PROCEDURE — 90834 PSYTX W PT 45 MINUTES: CPT | Mod: 95

## 2023-10-31 PROCEDURE — 90847 FAMILY PSYTX W/PT 50 MIN: CPT | Mod: 95

## 2023-11-07 ENCOUNTER — APPOINTMENT (OUTPATIENT)
Dept: PSYCHIATRY | Facility: CLINIC | Age: 68
End: 2023-11-07
Payer: MEDICARE

## 2023-11-07 PROCEDURE — 90834 PSYTX W PT 45 MINUTES: CPT | Mod: 95

## 2023-11-07 PROCEDURE — 99214 OFFICE O/P EST MOD 30 MIN: CPT | Mod: 95

## 2023-11-07 RX ORDER — PROPRANOLOL HYDROCHLORIDE 40 MG/1
40 TABLET ORAL
Qty: 180 | Refills: 0 | Status: DISCONTINUED | COMMUNITY
Start: 2021-11-30 | End: 2023-11-07

## 2023-11-08 ENCOUNTER — APPOINTMENT (OUTPATIENT)
Dept: PSYCHIATRY | Facility: CLINIC | Age: 68
End: 2023-11-08

## 2023-11-14 ENCOUNTER — APPOINTMENT (OUTPATIENT)
Dept: PSYCHIATRY | Facility: CLINIC | Age: 68
End: 2023-11-14
Payer: COMMERCIAL

## 2023-11-14 PROCEDURE — 90834 PSYTX W PT 45 MINUTES: CPT | Mod: 95

## 2023-11-14 PROCEDURE — 90847 FAMILY PSYTX W/PT 50 MIN: CPT | Mod: 95

## 2023-11-15 ENCOUNTER — APPOINTMENT (OUTPATIENT)
Dept: PSYCHIATRY | Facility: CLINIC | Age: 68
End: 2023-11-15

## 2023-11-21 ENCOUNTER — APPOINTMENT (OUTPATIENT)
Dept: PSYCHIATRY | Facility: CLINIC | Age: 68
End: 2023-11-21
Payer: MEDICARE

## 2023-11-21 PROCEDURE — 90834 PSYTX W PT 45 MINUTES: CPT | Mod: 95

## 2023-11-22 ENCOUNTER — APPOINTMENT (OUTPATIENT)
Dept: PSYCHIATRY | Facility: CLINIC | Age: 68
End: 2023-11-22

## 2023-11-28 ENCOUNTER — APPOINTMENT (OUTPATIENT)
Dept: PSYCHIATRY | Facility: CLINIC | Age: 68
End: 2023-11-28
Payer: MEDICARE

## 2023-11-28 PROCEDURE — 90834 PSYTX W PT 45 MINUTES: CPT | Mod: 95

## 2023-11-28 PROCEDURE — 90847 FAMILY PSYTX W/PT 50 MIN: CPT | Mod: 95

## 2023-11-29 ENCOUNTER — APPOINTMENT (OUTPATIENT)
Dept: PSYCHIATRY | Facility: CLINIC | Age: 68
End: 2023-11-29

## 2023-12-05 ENCOUNTER — APPOINTMENT (OUTPATIENT)
Dept: PSYCHIATRY | Facility: CLINIC | Age: 68
End: 2023-12-05
Payer: MEDICARE

## 2023-12-05 PROCEDURE — 90834 PSYTX W PT 45 MINUTES: CPT | Mod: 95

## 2023-12-05 PROCEDURE — 99214 OFFICE O/P EST MOD 30 MIN: CPT | Mod: 95

## 2023-12-06 ENCOUNTER — APPOINTMENT (OUTPATIENT)
Dept: PSYCHIATRY | Facility: CLINIC | Age: 68
End: 2023-12-06

## 2023-12-12 ENCOUNTER — APPOINTMENT (OUTPATIENT)
Dept: PSYCHIATRY | Facility: CLINIC | Age: 68
End: 2023-12-12
Payer: MEDICARE

## 2023-12-12 PROCEDURE — 90847 FAMILY PSYTX W/PT 50 MIN: CPT | Mod: 95

## 2023-12-12 PROCEDURE — 90834 PSYTX W PT 45 MINUTES: CPT | Mod: 95

## 2023-12-13 ENCOUNTER — APPOINTMENT (OUTPATIENT)
Dept: PSYCHIATRY | Facility: CLINIC | Age: 68
End: 2023-12-13

## 2023-12-13 NOTE — PLAN
[Emotionally Focused Couples Therapy] : Emotionally Focused Couples Therapy [de-identified] : Patient and  revisited the debate of staying in [place] vs moving somewhere else. Patient and partner explored their individual needs. Emotionally focused therapy interventions used throughout the session. Patient and  were engaged and receptive to interventions used and ended the session in good emotional and behavioral control.  [FreeTextEntry1] : Biweekly couples therapy

## 2023-12-19 ENCOUNTER — APPOINTMENT (OUTPATIENT)
Dept: PSYCHIATRY | Facility: CLINIC | Age: 68
End: 2023-12-19
Payer: MEDICARE

## 2023-12-19 PROCEDURE — 90834 PSYTX W PT 45 MINUTES: CPT | Mod: 95
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
## 2023-12-19 NOTE — REASON FOR VISIT
[Continuity of care] : to ensure continuity of care [Telehealth (audio & video) - Individual/Group] : This visit was provided via telehealth using real-time 2-way audio visual technology. [Other Location: e.g. Home (Enter Location, City,State)___] : The provider was located at [unfilled]. [Home] : The patient, [unfilled], was located at home, [unfilled], at the time of the visit. [Verbal consent obtained from patient/other participant(s)] : Verbal consent for telehealth/telephonic services obtained from patient/other participant(s) [Patient] : Patient [FreeTextEntry4] : 10:00 am [FreeTextEntry5] : 10:45 am [FreeTextEntry1] : anxiety, sadness, trauma hx

## 2023-12-19 NOTE — PLAN
[Psychodynamic Therapy] : Psychodynamic Therapy  [Recommended Frequency of Visits: ____] : Recommended frequency of visits: [unfilled] [Return in ____ week(s)] : Return in [unfilled] week(s) [FreeTextEntry2] : 1. develop coping strategies for depressive and anxious symptoms 2. develop narrative to assist in processing traumatic memories  [de-identified] : Ms. De La Rosa arrived on time for a rescheduled individual session of remote psychotherapy. She reported continued positive response to Buspar. For a recent social event, she tried an increase in her gabapentin PRN and found it helped her manage her anxiety for the event but also made her a little dizzy. she was encouraged to discuss her symptoms with her psychiatrist. Ms. De La Rosa also explored her desire to return to learning more about Restoration[employer] and resume her interest in dance that she moved away from while raising her children. Feelings validated and insight-oriented interventions offered. Ms. De La Rosa ended session in good emotional and behavioral control. [FreeTextEntry1] : 1. Continue individual psychotherapy with Dr. Card. 2. Continue medication management with Dr. Adams. 3. Continue couples therapy with Mr. Palomino. 4. Continue trauma focused group work with Dr. Lopes. 5. Treatment plan and IAP to be revised in March 2024.

## 2023-12-19 NOTE — END OF VISIT
[Duration of Psychotherapy Visit (minutes spent in synchronous communication): ____] : Duration of Psychotherapy Visit (minutes spent in synchronous communication): [unfilled] [Individual Psychotherapy for 38-52 minutes] : Individual Psychotherapy for 38 - 52 minutes [Teletherapy Service Provided] : The services provided in this session were delivered via tele-therapy [Licensed Clinician] : Licensed Clinician [FreeTextEntry3] : Home ([place], [place]) [FreeTextEntry5] : Home ([place], [place])

## 2023-12-19 NOTE — PHYSICAL EXAM
[Average] : average [Cooperative] : cooperative [Depressed] : depressed [Anxious] : anxious [Full] : full [Clear] : clear [Linear/Goal Directed] : linear/goal directed [Preoccupations/Ruminations] : preoccupations/ruminations [Depressive] : depressive [None Reported] : none reported [WNL] : within normal limits [de-identified] : hypervigilant/mild paranoid response to news [de-identified] : concerned about memory and attention [de-identified] : psychological eval and neurological eval completed at [facility] in 2019

## 2023-12-20 ENCOUNTER — APPOINTMENT (OUTPATIENT)
Dept: PSYCHIATRY | Facility: CLINIC | Age: 68
End: 2023-12-20

## 2023-12-26 ENCOUNTER — APPOINTMENT (OUTPATIENT)
Dept: PSYCHIATRY | Facility: CLINIC | Age: 68
End: 2023-12-26

## 2023-12-27 ENCOUNTER — APPOINTMENT (OUTPATIENT)
Dept: PSYCHIATRY | Facility: CLINIC | Age: 68
End: 2023-12-27

## 2024-01-02 ENCOUNTER — APPOINTMENT (OUTPATIENT)
Dept: PSYCHIATRY | Facility: CLINIC | Age: 69
End: 2024-01-02
Payer: COMMERCIAL

## 2024-01-02 ENCOUNTER — APPOINTMENT (OUTPATIENT)
Dept: PSYCHIATRY | Facility: CLINIC | Age: 69
End: 2024-01-02

## 2024-01-02 PROCEDURE — 99214 OFFICE O/P EST MOD 30 MIN: CPT | Mod: 95

## 2024-01-02 NOTE — HISTORY OF PRESENT ILLNESS
[Not Applicable] : Not applicable [FreeTextEntry1] : Patient is seen and evaluated. Interval history reviewed. She reports diminished anxiety since starting buspirone. She typically struggles this time of year with the holidays, but notes having some productive conversations with her sister. Denies any other change frombaseline. Continues to enagge in I and couples therapy.  [FreeTextEntry2] : See PPHx [FreeTextEntry3] : See PPHx

## 2024-01-02 NOTE — RISK ASSESSMENT
[No, patient denies ideation or behavior] : No, patient denies ideation or behavior [Low acute suicide risk] : Low acute suicide risk [No] : No [Not clinically indicated] : Safety Plan completed/updated (for individuals at risk): Not clinically indicated [FreeTextEntry9] : No acute risk. Protective factors include connection to tx, engagement with providers, family support, future orientation.

## 2024-01-02 NOTE — REASON FOR VISIT
[Patient preference] : as per patient preference [Continuing, patient not seen in-person within last 12 months (provide details below)] : Telehealth services are continuing, patient not seen in-person within last 12 months.  [Telehealth (audio & video) - Individual/Group] : This visit was provided via telehealth using real-time 2-way audio visual technology. [Medical Office: ([facility])___] : The provider was located at the medical office in [unfilled]. [Home] : The patient, [unfilled], was located at home, [unfilled], at the time of the visit. [Verbal consent obtained from patient/other participant(s)] : Verbal consent for telehealth/telephonic services obtained from patient/other participant(s) [Patient] : Patient [FreeTextEntry4] : 1:00p [FreeTextEntry5] : 1:30p [FreeTextEntry1] : Psychiatric Follow Up

## 2024-01-02 NOTE — DISCUSSION/SUMMARY
[FreeTextEntry1] : 67 year old , domiciled with family, employed  woman with history of Borderline Personality Disorder, JAMESON, who continues to report affective instability, destabilization, tearfulness, and feelings of low self-worth in the setting of multiple psychosocial stressors.  30 mins spent: Psychoeducation, supportive counseling, prescribing meds, documentation. Discussed interpersonal challenges, career plans. Shared decision to increase dose of propranolol given increased anxiety. Shared decision to continue fluoxetine at current doses given good tolerance and response. Patient is aware of risks vs benefits of medications and potential side effects.

## 2024-01-02 NOTE — PHYSICAL EXAM
[Average] : average [Cooperative] : cooperative [Anxious] : anxious [Labile] : labile [Clear] : clear [Linear/Goal Directed] : linear/goal directed [WNL] : within normal limits [de-identified] : at times tearful

## 2024-01-02 NOTE — SOCIAL HISTORY
[No Known Substance Use] : no known substance use [FreeTextEntry1] : Domiciled in [place] with  and the younger of two adopted daughters from [place] (older daughter is away at college).  Family history of alcohol use disorder.  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

## 2024-01-02 NOTE — PLAN
[No] : No [Medication education provided] : Medication education provided. [Rationale for medication choices, possible risks/precautions, benefits, alternative treatment choices, and consequences of non-treatment discussed] : Rationale for medication choices, possible risks/precautions, benefits, alternative treatment choices, and consequences of non-treatment discussed with patient/family/caregiver  [Order(s) for medication placed in [facility] EHR] : Medication [FreeTextEntry4] : Discussion of relationship dynamics that result in anxiety [FreeTextEntry5] : #) Continue buspirone 10mg Po BID #) Continue fluoxetine 80mg PO QDaily #) Continue propranolol 60mg PO BID PRN anxiety (On 11/7) #) Continue I/G therapy #) f/u in 1 month #) Call 911 or go to ER in case of emergency

## 2024-01-03 ENCOUNTER — APPOINTMENT (OUTPATIENT)
Dept: PSYCHIATRY | Facility: CLINIC | Age: 69
End: 2024-01-03

## 2024-01-09 ENCOUNTER — APPOINTMENT (OUTPATIENT)
Dept: PSYCHIATRY | Facility: CLINIC | Age: 69
End: 2024-01-09
Payer: COMMERCIAL

## 2024-01-09 PROCEDURE — 90834 PSYTX W PT 45 MINUTES: CPT | Mod: 95

## 2024-01-09 PROCEDURE — 90847 FAMILY PSYTX W/PT 50 MIN: CPT | Mod: 95

## 2024-01-10 ENCOUNTER — APPOINTMENT (OUTPATIENT)
Dept: PSYCHIATRY | Facility: CLINIC | Age: 69
End: 2024-01-10

## 2024-01-10 NOTE — REASON FOR VISIT
[Continuity of care] : to ensure continuity of care [Telehealth (audio & video) - Individual/Group] : This visit was provided via telehealth using real-time 2-way audio visual technology. [Other Location: e.g. Home (Enter Location, City,State)___] : The provider was located at [unfilled]. [Home] : The patient, [unfilled], was located at home, [unfilled], at the time of the visit. [Verbal consent obtained from patient/other participant(s)] : Verbal consent for telehealth/telephonic services obtained from patient/other participant(s) [FreeTextEntry4] : 10:00 am [FreeTextEntry5] : 10:45 am [Patient] : Patient [FreeTextEntry1] : anxiety, sadness, trauma hx

## 2024-01-10 NOTE — PLAN
[FreeTextEntry2] : 1. develop coping strategies for depressive and anxious symptoms 2. develop narrative to assist in processing traumatic memories  [Psychodynamic Therapy] : Psychodynamic Therapy  [de-identified] : Ms. De La Rosa arrived on time for an individual session of remote psychotherapy. She reported continued positive response to Buspar in combination with increased dose of propanolol. She explored financial anxiety about 's upcoming [facility]. Feelings validated, couples therapy explored and insight-oriented interventions offered. Ms. De La Rosa ended session in good emotional and behavioral control. [Recommended Frequency of Visits: ____] : Recommended frequency of visits: [unfilled] [Return in ____ week(s)] : Return in [unfilled] week(s) [FreeTextEntry1] : 1. Continue individual psychotherapy with Dr. Card. 2. Continue medication management with Dr. Adams. 3. Continue couples therapy with Mr. Palomino. 4. Continue trauma focused group work with Dr. Lopes. 5. Treatment plan and IAP to be revised in March 2024.

## 2024-01-10 NOTE — PHYSICAL EXAM
[Average] : average [Cooperative] : cooperative [Depressed] : depressed [Anxious] : anxious [Full] : full [Clear] : clear [Linear/Goal Directed] : linear/goal directed [Preoccupations/Ruminations] : preoccupations/ruminations [Depressive] : depressive [None Reported] : none reported [WNL] : within normal limits [de-identified] : hypervigilant/mild paranoid response to news [de-identified] : concerned about memory and attention [de-identified] : psychological eval and neurological eval completed at [facility] in 2019

## 2024-01-10 NOTE — END OF VISIT
[Duration of Psychotherapy Visit (minutes spent in synchronous communication): ____] : Duration of Psychotherapy Visit (minutes spent in synchronous communication): [unfilled] [Individual Psychotherapy for 38-52 minutes] : Individual Psychotherapy for 38 - 52 minutes [Teletherapy Service Provided] : The services provided in this session were delivered via tele-therapy [FreeTextEntry3] : Home ([place], [place]) [FreeTextEntry5] : Home ([place], [place]) [Licensed Clinician] : Licensed Clinician

## 2024-01-10 NOTE — PLAN
[Emotionally Focused Couples Therapy] : Emotionally Focused Couples Therapy [de-identified] : Patient and  discussed their financials needs and the roles they play in decision for  to retire. Emotionally focused therapy interventions used throughout the session. Patient and  were engaged and receptive to interventions used and ended the session in good emotional and behavioral control.  [FreeTextEntry1] : Biweekly couples therapy
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
## 2024-01-16 ENCOUNTER — APPOINTMENT (OUTPATIENT)
Dept: PSYCHIATRY | Facility: CLINIC | Age: 69
End: 2024-01-16
Payer: COMMERCIAL

## 2024-01-16 PROCEDURE — 90834 PSYTX W PT 45 MINUTES: CPT | Mod: 95

## 2024-01-16 NOTE — END OF VISIT
[Duration of Psychotherapy Visit (minutes spent in synchronous communication): ____] : Duration of Psychotherapy Visit (minutes spent in synchronous communication): [unfilled] [Individual Psychotherapy for 38-52 minutes] : Individual Psychotherapy for 38 - 52 minutes [Teletherapy Service Provided] : The services provided in this session were delivered via tele-therapy [Licensed Clinician] : Licensed Clinician [FreeTextEntry3] : Home ([place]) [FreeTextEntry5] : Home ([place], [place])

## 2024-01-16 NOTE — PLAN
[Psychodynamic Therapy] : Psychodynamic Therapy  [Recommended Frequency of Visits: ____] : Recommended frequency of visits: [unfilled] [Return in ____ week(s)] : Return in [unfilled] week(s) [FreeTextEntry2] : 1. develop coping strategies for depressive and anxious symptoms 2. develop narrative to assist in processing traumatic memories  [de-identified] : Ms. De La Rosa arrived on time for an individual session of remote psychotherapy. 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 Feelings validated and insight-oriented interventions offered. Ms. De La Rosa ended session in good emotional and behavioral control. [FreeTextEntry1] : 1. Continue individual psychotherapy with Dr. Card. 2. Continue medication management with Dr. Adams. 3. Continue couples therapy with Mr. Palomino. 4. Continue trauma focused group work with Dr. Lopes. 5. Treatment plan and IAP to be revised in March 2024.

## 2024-01-16 NOTE — PHYSICAL EXAM
[Average] : average [Cooperative] : cooperative [Depressed] : depressed [Anxious] : anxious [Full] : full [Clear] : clear [Linear/Goal Directed] : linear/goal directed [Preoccupations/Ruminations] : preoccupations/ruminations [Depressive] : depressive [None Reported] : none reported [WNL] : within normal limits [de-identified] : hypervigilant/mild paranoid response to news [de-identified] : concerned about memory and attention [de-identified] : psychological eval and neurological eval completed at [facility] in 2019
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
## 2024-01-17 ENCOUNTER — APPOINTMENT (OUTPATIENT)
Dept: PSYCHIATRY | Facility: CLINIC | Age: 69
End: 2024-01-17

## 2024-01-23 ENCOUNTER — APPOINTMENT (OUTPATIENT)
Dept: PSYCHIATRY | Facility: CLINIC | Age: 69
End: 2024-01-23
Payer: MEDICARE

## 2024-01-23 PROCEDURE — 90834 PSYTX W PT 45 MINUTES: CPT | Mod: 95

## 2024-01-23 PROCEDURE — 90847 FAMILY PSYTX W/PT 50 MIN: CPT | Mod: 95

## 2024-01-23 NOTE — PLAN
[FreeTextEntry2] : 1. develop coping strategies for depressive and anxious symptoms 2. develop narrative to assist in processing traumatic memories  [Dialectical Behavior Therapy] : Dialectical Behavior Therapy  [Psychodynamic Therapy] : Psychodynamic Therapy  [Recommended Frequency of Visits: ____] : Recommended frequency of visits: [unfilled] [de-identified] : Ms. De La Rosa arrived 5 minutes late for an individual session of remote psychotherapy. She explored a letter she was hoping to send to her sisters to request a personal loan. She also explored her feelings regarding her current work environment and feelings judged by the owner and a staff member. Feelings validated and DBT interventions offered. Ms. De La Rosa ended session in good emotional and behavioral control. [Return in ____ week(s)] : Return in [unfilled] week(s) [FreeTextEntry1] : 1. Continue individual psychotherapy with Dr. Card. 2. Continue medication management with Dr. Adams. 3. Continue couples therapy with Mr. Palomino. 4. Continue trauma focused group work with Dr. Lopes. 5. Treatment plan and IAP to be revised in March 2024.

## 2024-01-23 NOTE — REASON FOR VISIT
[Continuity of care] : to ensure continuity of care [Telehealth (audio & video) - Individual/Group] : This visit was provided via telehealth using real-time 2-way audio visual technology. [Other Location: e.g. Home (Enter Location, City,State)___] : The provider was located at [unfilled]. [Home] : The patient, [unfilled], was located at home, [unfilled], at the time of the visit. [Verbal consent obtained from patient/other participant(s)] : Verbal consent for telehealth/telephonic services obtained from patient/other participant(s) [FreeTextEntry4] : 10:05 am [FreeTextEntry5] : 10:50 am [Patient] : Patient [FreeTextEntry1] : anxiety, sadness, trauma hx

## 2024-01-23 NOTE — END OF VISIT
[Individual Psychotherapy for 38-52 minutes] : Individual Psychotherapy for 38 - 52 minutes [Duration of Psychotherapy Visit (minutes spent in synchronous communication): ____] : Duration of Psychotherapy Visit (minutes spent in synchronous communication): [unfilled] [Teletherapy Service Provided] : The services provided in this session were delivered via tele-therapy [FreeTextEntry3] : Home ([place], [place]) [FreeTextEntry5] : Home ([place], [place]) [Licensed Clinician] : Licensed Clinician
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
## 2024-01-24 ENCOUNTER — APPOINTMENT (OUTPATIENT)
Dept: PSYCHIATRY | Facility: CLINIC | Age: 69
End: 2024-01-24

## 2024-01-24 NOTE — PLAN
[Emotionally Focused Couples Therapy] : Emotionally Focused Couples Therapy [de-identified] : Patient and  discussed concerns about their finances and the possibility of asking patient's family for a loan. Emotionally focused therapy interventions used throughout the session. Patient and  were engaged and receptive to interventions used and ended the session in good emotional and behavioral control.  [FreeTextEntry1] : Biweekly couples therapy

## 2024-01-24 NOTE — END OF VISIT
[Duration of Psychotherapy Visit (minutes spent in synchronous communication): ____] : Duration of Psychotherapy Visit (minutes spent in synchronous communication): [unfilled] [Teletherapy Service Provided] : The services provided in this session were delivered via tele-therapy [Family Therapy With Client Present] : Family Therapy With Client Present  [After Hours] : After Hours [FreeTextEntry3] : Home [FreeTextEntry5] : Home [FreeTextEntry6] :  was present and a participant in couples counseling

## 2024-01-30 ENCOUNTER — APPOINTMENT (OUTPATIENT)
Dept: PSYCHIATRY | Facility: CLINIC | Age: 69
End: 2024-01-30
Payer: MEDICARE

## 2024-01-30 PROCEDURE — 90834 PSYTX W PT 45 MINUTES: CPT | Mod: 95

## 2024-01-31 ENCOUNTER — APPOINTMENT (OUTPATIENT)
Dept: PSYCHIATRY | Facility: CLINIC | Age: 69
End: 2024-01-31

## 2024-01-31 NOTE — PLAN
[Dialectical Behavior Therapy] : Dialectical Behavior Therapy  [Psychodynamic Therapy] : Psychodynamic Therapy  [Recommended Frequency of Visits: ____] : Recommended frequency of visits: [unfilled] [Return in ____ week(s)] : Return in [unfilled] week(s) [FreeTextEntry2] : 1. develop coping strategies for depressive and anxious symptoms 2. develop narrative to assist in processing traumatic memories  [de-identified] : Ms. De La Rosa arrived on time for an individual session of remote psychotherapy. She explored a new job offer and her difficulties with self-esteem. Feelings validated and DBT interventions offered. Medication questions also explored and pt encouraged to speak with psychiatrist. Ms. De La Rosa ended session in good emotional and behavioral control. [FreeTextEntry1] : 1. Continue individual psychotherapy with Dr. Card. 2. Continue medication management with Dr. Adams. 3. Continue couples therapy with Mr. Palomino. 4. Continue trauma focused group work with Dr. Lopes. 5. Treatment plan and IAP to be revised in March 2024.
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
## 2024-01-31 NOTE — END OF VISIT
[Duration of Psychotherapy Visit (minutes spent in synchronous communication): ____] : Duration of Psychotherapy Visit (minutes spent in synchronous communication): [unfilled] [Individual Psychotherapy for 38-52 minutes] : Individual Psychotherapy for 38 - 52 minutes [Teletherapy Service Provided] : The services provided in this session were delivered via tele-therapy [Licensed Clinician] : Licensed Clinician [FreeTextEntry3] : Home ([place], [place]) [FreeTextEntry5] : Home ([place], [place])

## 2024-01-31 NOTE — PHYSICAL EXAM
[Average] : average [Cooperative] : cooperative [Depressed] : depressed [Anxious] : anxious [Full] : full [Clear] : clear [Linear/Goal Directed] : linear/goal directed [Preoccupations/Ruminations] : preoccupations/ruminations [Depressive] : depressive [None Reported] : none reported [WNL] : within normal limits [de-identified] : hypervigilant/mild paranoid response to news [de-identified] : concerned about memory and attention [de-identified] : psychological eval and neurological eval completed at [facility] in 2019

## 2024-02-01 ENCOUNTER — OUTPATIENT (OUTPATIENT)
Dept: OUTPATIENT SERVICES | Facility: HOSPITAL | Age: 69
LOS: 1 days | Discharge: HOME | End: 2024-02-01

## 2024-02-06 ENCOUNTER — APPOINTMENT (OUTPATIENT)
Dept: PSYCHIATRY | Facility: CLINIC | Age: 69
End: 2024-02-06
Payer: MEDICARE

## 2024-02-06 ENCOUNTER — APPOINTMENT (OUTPATIENT)
Dept: PSYCHIATRY | Facility: CLINIC | Age: 69
End: 2024-02-06

## 2024-02-06 PROCEDURE — 90847 FAMILY PSYTX W/PT 50 MIN: CPT | Mod: 95

## 2024-02-06 PROCEDURE — 90834 PSYTX W PT 45 MINUTES: CPT | Mod: 95

## 2024-02-07 NOTE — PLAN
[Emotionally Focused Couples Therapy] : Emotionally Focused Couples Therapy [de-identified] : Patient and  discussed there anxieties and fears related to their tight financial situation and what would happen if they needed money for an emergency. Patient and  explored idea of asking family for help. Emotionally focused therapy interventions used throughout the session. Patient and  were engaged and receptive to interventions used and ended the session in good emotional and behavioral control.  [FreeTextEntry1] : Biweekly couples therapy

## 2024-02-07 NOTE — REASON FOR VISIT
[Patient preference] : as per patient preference [Other Location: e.g. Home (Enter Location, City,State)___] : The provider was located at [unfilled]. [Telehealth (audio & video) - Couple/Family] : This visit was provided via telehealth using real-time 2-way audio visual technology.  [Home] : The patient, [unfilled], was located at home, [unfilled], at the time of the visit. [Verbal consent obtained from patient/other participant(s)] : Verbal consent for telehealth/telephonic services obtained from patient/other participant(s) [FreeTextEntry4] : 6:30pm [FreeTextEntry5] : 7:15pm [FreeTextEntry1] : James Del Rio [Patient with collateral] : Patient with collateral  [Spouse] : spouse

## 2024-02-13 ENCOUNTER — APPOINTMENT (OUTPATIENT)
Dept: PSYCHIATRY | Facility: CLINIC | Age: 69
End: 2024-02-13
Payer: MEDICARE

## 2024-02-13 PROCEDURE — 90834 PSYTX W PT 45 MINUTES: CPT | Mod: 95

## 2024-02-13 NOTE — PHYSICAL EXAM
[Average] : average [Cooperative] : cooperative [Depressed] : depressed [Anxious] : anxious [Full] : full [Clear] : clear [Linear/Goal Directed] : linear/goal directed [Preoccupations/Ruminations] : preoccupations/ruminations [Depressive] : depressive [None Reported] : none reported [WNL] : within normal limits [de-identified] : hypervigilant/mild paranoid response to news [de-identified] : concerned about memory and attention [de-identified] : psychological eval and neurological eval completed at [facility] in 2019

## 2024-02-13 NOTE — PLAN
[Dialectical Behavior Therapy] : Dialectical Behavior Therapy  [Psychodynamic Therapy] : Psychodynamic Therapy  [Recommended Frequency of Visits: ____] : Recommended frequency of visits: [unfilled] [Return in ____ week(s)] : Return in [unfilled] week(s) [FreeTextEntry2] : 1. develop coping strategies for depressive and anxious symptoms 2. develop narrative to assist in processing traumatic memories  [de-identified] : Ms. De La Rosa arrived 5 minutes late for an individual session of remote psychotherapy. 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 She also explored an influential  at her Mu-ism who combines Shinto and Rastafari teachings. Feelings validated and DBT radical acceptance interventions offered. Ms. De La Rosa ended session in good emotional and behavioral control. [FreeTextEntry1] : 1. Continue individual psychotherapy with Dr. Card. 2. Continue medication management with Dr. Adams. 3. Continue couples therapy with Mr. Palomino. 4. Continue trauma focused group work with Dr. Lopes. 5. Treatment plan and IAP to be revised in March 2024.
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
## 2024-02-13 NOTE — REASON FOR VISIT
[Continuity of care] : to ensure continuity of care [Telehealth (audio & video) - Individual/Group] : This visit was provided via telehealth using real-time 2-way audio visual technology. [Other Location: e.g. Home (Enter Location, City,State)___] : The provider was located at [unfilled]. [Home] : The patient, [unfilled], was located at home, [unfilled], at the time of the visit. [Verbal consent obtained from patient/other participant(s)] : Verbal consent for telehealth/telephonic services obtained from patient/other participant(s) [Patient] : Patient [FreeTextEntry4] : 4:05 pm [FreeTextEntry5] : 4:45 pm [FreeTextEntry1] : anxiety, sadness, trauma hx

## 2024-02-13 NOTE — END OF VISIT
[Duration of Psychotherapy Visit (minutes spent in synchronous communication): ____] : Duration of Psychotherapy Visit (minutes spent in synchronous communication): [unfilled] [Individual Psychotherapy for 38-52 minutes] : Individual Psychotherapy for 38 - 52 minutes [Teletherapy Service Provided] : The services provided in this session were delivered via tele-therapy [Licensed Clinician] : Licensed Clinician [FreeTextEntry3] : Home ([place], [place]) [FreeTextEntry5] : Home ([place], [place])

## 2024-02-20 ENCOUNTER — APPOINTMENT (OUTPATIENT)
Dept: PSYCHIATRY | Facility: CLINIC | Age: 69
End: 2024-02-20
Payer: MEDICARE

## 2024-02-20 PROCEDURE — 90834 PSYTX W PT 45 MINUTES: CPT | Mod: 95

## 2024-02-27 ENCOUNTER — APPOINTMENT (OUTPATIENT)
Dept: PSYCHIATRY | Facility: CLINIC | Age: 69
End: 2024-02-27
Payer: MEDICARE

## 2024-02-27 PROCEDURE — 90834 PSYTX W PT 45 MINUTES: CPT | Mod: 95

## 2024-02-28 NOTE — PHYSICAL EXAM
[Average] : average [Anxious] : anxious [Depressed] : depressed [Cooperative] : cooperative [Clear] : clear [Full] : full [Preoccupations/Ruminations] : preoccupations/ruminations [Linear/Goal Directed] : linear/goal directed [Depressive] : depressive [None Reported] : none reported [WNL] : within normal limits [de-identified] : hypervigilant/mild paranoid response to news [de-identified] : concerned about memory and attention [de-identified] : psychological eval and neurological eval completed at [facility] in 2019

## 2024-02-28 NOTE — END OF VISIT
[Individual Psychotherapy for 38-52 minutes] : Individual Psychotherapy for 38 - 52 minutes [Duration of Psychotherapy Visit (minutes spent in synchronous communication): ____] : Duration of Psychotherapy Visit (minutes spent in synchronous communication): [unfilled] [FreeTextEntry3] : Home ([place], [place]) [Teletherapy Service Provided] : The services provided in this session were delivered via tele-therapy [Licensed Clinician] : Licensed Clinician [FreeTextEntry5] : Home ([place], [place])
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
## 2024-02-28 NOTE — PLAN
[FreeTextEntry2] : 1. develop coping strategies for depressive and anxious symptoms 2. develop narrative to assist in processing traumatic memories  [Dialectical Behavior Therapy] : Dialectical Behavior Therapy  [Psychodynamic Therapy] : Psychodynamic Therapy  [Recommended Frequency of Visits: ____] : Recommended frequency of visits: [unfilled] [de-identified] : Ms. De La Rosa arrived on time for an individual session of remote psychotherapy. 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 Feelings validated and insight-oriented interventions offered. Ms. De La Rosa ended session in good emotional and behavioral control. [Return in ____ week(s)] : Return in [unfilled] week(s) [FreeTextEntry1] : 1. Continue individual psychotherapy with Dr. Card. 2. Continue medication management with Dr. Adams. 3. Continue couples therapy with Mr. Palomino. 4. Continue trauma focused group work with Dr. Lopes. 5. Treatment plan and IAP to be revised in March 2024.

## 2024-02-28 NOTE — REASON FOR VISIT
[Continuity of care] : to ensure continuity of care [Telehealth (audio & video) - Individual/Group] : This visit was provided via telehealth using real-time 2-way audio visual technology. [Home] : The patient, [unfilled], was located at home, [unfilled], at the time of the visit. [Other Location: e.g. Home (Enter Location, City,State)___] : The provider was located at [unfilled]. [FreeTextEntry4] : 4:00 pm [Verbal consent obtained from patient/other participant(s)] : Verbal consent for telehealth/telephonic services obtained from patient/other participant(s) [Patient] : Patient [FreeTextEntry5] : 4:45 pm [FreeTextEntry1] : anxiety, sadness, trauma hx

## 2024-03-05 ENCOUNTER — APPOINTMENT (OUTPATIENT)
Dept: PSYCHIATRY | Facility: CLINIC | Age: 69
End: 2024-03-05
Payer: MEDICARE

## 2024-03-05 ENCOUNTER — APPOINTMENT (OUTPATIENT)
Dept: PSYCHIATRY | Facility: CLINIC | Age: 69
End: 2024-03-05
Payer: COMMERCIAL

## 2024-03-05 ENCOUNTER — NON-APPOINTMENT (OUTPATIENT)
Age: 69
End: 2024-03-05

## 2024-03-05 PROCEDURE — 90847 FAMILY PSYTX W/PT 50 MIN: CPT | Mod: 95

## 2024-03-05 PROCEDURE — 90834 PSYTX W PT 45 MINUTES: CPT | Mod: 95

## 2024-03-05 NOTE — END OF VISIT
[Duration of Psychotherapy Visit (minutes spent in synchronous communication): ____] : Duration of Psychotherapy Visit (minutes spent in synchronous communication): [unfilled] [Individual Psychotherapy for 38-52 minutes] : Individual Psychotherapy for 38 - 52 minutes [Teletherapy Service Provided] : The services provided in this session were delivered via tele-therapy [FreeTextEntry3] : Home ([place], [place]) [FreeTextEntry5] : Home ([place], [place]) [Licensed Clinician] : Licensed Clinician

## 2024-03-05 NOTE — PHYSICAL EXAM
[Average] : average [Cooperative] : cooperative [Depressed] : depressed [Anxious] : anxious [Full] : full [Clear] : clear [Linear/Goal Directed] : linear/goal directed [Preoccupations/Ruminations] : preoccupations/ruminations [Depressive] : depressive [None Reported] : none reported [WNL] : within normal limits [de-identified] : hypervigilant/mild paranoid response to news [de-identified] : concerned about memory and attention [de-identified] : psychological eval and neurological eval completed at [facility] in 2019
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
## 2024-03-05 NOTE — PLAN
[FreeTextEntry2] : 1. develop coping strategies for depressive and anxious symptoms 2. develop narrative to assist in processing traumatic memories  [Dialectical Behavior Therapy] : Dialectical Behavior Therapy  [Psychodynamic Therapy] : Psychodynamic Therapy  [de-identified] : Ms. De La Rosa arrived on time for an individual session of remote psychotherapy. We completed treatment plan and health assessment. She expressed interested in DBT Skills group. 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 Feelings validated and insight-oriented interventions offered. Ms. De La Rosa ended session in good emotional and behavioral control. [Recommended Frequency of Visits: ____] : Recommended frequency of visits: [unfilled] [Return in ____ week(s)] : Return in [unfilled] week(s) [FreeTextEntry1] : 1. Continue individual psychotherapy with Dr. Card. 2. Continue medication management with Dr. Adams. 3. Continue couples therapy with Mr. Palomino. 4. Explore DBT Skills group work with Dr. Wilhelm. 5. Treatment plan and IAP to be revised in March 2024.

## 2024-03-06 NOTE — PHYSICAL EXAM
[Cooperative] : cooperative [Euthymic] : euthymic [Full] : full [Linear/Goal Directed] : linear/goal directed [Clear] : clear [Average] : average [WNL] : within normal limits

## 2024-03-06 NOTE — PLAN
[Emotionally Focused Couples Therapy] : Emotionally Focused Couples Therapy [de-identified] : Patient discussed her vacillating mood and feeling overwhelmed by work and feelings of isolation.  discussed difficulty expressing his feelings out of fear that they will cause more conflict and emotional dis-regulation. Emotionally focused therapy interventions used throughout the session. Patient and  were engaged and receptive to interventions used and ended the session in good emotional and behavioral control.  [FreeTextEntry1] : Biweekly couples therapy

## 2024-03-06 NOTE — REASON FOR VISIT
[Patient preference] : as per patient preference [Telehealth (audio & video) - Couple/Family] : This visit was provided via telehealth using real-time 2-way audio visual technology.  [Other Location: e.g. Home (Enter Location, City,State)___] : The provider was located at [unfilled]. [Home] : The patient, [unfilled], was located at home, [unfilled], at the time of the visit. [Verbal consent obtained from patient/other participant(s)] : Verbal consent for telehealth/telephonic services obtained from patient/other participant(s) [Patient with collateral] : Patient with collateral  [Spouse] : spouse [FreeTextEntry5] : 7:15pm [FreeTextEntry4] : 6:30pm [FreeTextEntry1] : James Del Rio

## 2024-03-08 DIAGNOSIS — F60.3 BORDERLINE PERSONALITY DISORDER: ICD-10-CM

## 2024-03-08 DIAGNOSIS — F34.1 DYSTHYMIC DISORDER: ICD-10-CM

## 2024-03-08 DIAGNOSIS — F43.23 ADJUSTMENT DISORDER WITH MIXED ANXIETY AND DEPRESSED MOOD: ICD-10-CM

## 2024-03-08 DIAGNOSIS — F41.1 GENERALIZED ANXIETY DISORDER: ICD-10-CM

## 2024-03-12 ENCOUNTER — APPOINTMENT (OUTPATIENT)
Dept: PSYCHIATRY | Facility: CLINIC | Age: 69
End: 2024-03-12
Payer: MEDICARE

## 2024-03-12 ENCOUNTER — APPOINTMENT (OUTPATIENT)
Dept: PSYCHIATRY | Facility: CLINIC | Age: 69
End: 2024-03-12
Payer: COMMERCIAL

## 2024-03-12 PROCEDURE — 90847 FAMILY PSYTX W/PT 50 MIN: CPT | Mod: 95

## 2024-03-12 PROCEDURE — 90834 PSYTX W PT 45 MINUTES: CPT | Mod: 95

## 2024-03-12 NOTE — PHYSICAL EXAM
[Average] : average [Depressed] : depressed [Cooperative] : cooperative [Clear] : clear [Anxious] : anxious [Full] : full [Preoccupations/Ruminations] : preoccupations/ruminations [Linear/Goal Directed] : linear/goal directed [Depressive] : depressive [None Reported] : none reported [WNL] : within normal limits [de-identified] : hypervigilant/mild paranoid response to news [de-identified] : concerned about memory and attention [de-identified] : psychological eval and neurological eval completed at [facility] in 2019

## 2024-03-12 NOTE — END OF VISIT
[Duration of Psychotherapy Visit (minutes spent in synchronous communication): ____] : Duration of Psychotherapy Visit (minutes spent in synchronous communication): [unfilled] [Individual Psychotherapy for 38-52 minutes] : Individual Psychotherapy for 38 - 52 minutes [Teletherapy Service Provided] : The services provided in this session were delivered via tele-therapy [Licensed Clinician] : Licensed Clinician [FreeTextEntry3] : Home ([place], [place]) [FreeTextEntry5] : Home ([place], [place])

## 2024-03-12 NOTE — REASON FOR VISIT
[Continuity of care] : to ensure continuity of care [Telehealth (audio & video) - Individual/Group] : This visit was provided via telehealth using real-time 2-way audio visual technology. [Home] : The patient, [unfilled], was located at home, [unfilled], at the time of the visit. [Other Location: e.g. Home (Enter Location, City,State)___] : The provider was located at [unfilled]. [Verbal consent obtained from patient/other participant(s)] : Verbal consent for telehealth/telephonic services obtained from patient/other participant(s) [Patient] : Patient [FreeTextEntry4] : 10:00 am [FreeTextEntry1] : anxiety, sadness, trauma hx [FreeTextEntry5] : 10:45 am

## 2024-03-12 NOTE — PLAN
[Psychodynamic Therapy] : Psychodynamic Therapy  [Dialectical Behavior Therapy] : Dialectical Behavior Therapy  [Recommended Frequency of Visits: ____] : Recommended frequency of visits: [unfilled] [Return in ____ week(s)] : Return in [unfilled] week(s) [de-identified] : Ms. De La Rosa arrived on time for an individual session of remote psychotherapy. 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 She found she enjoyed being with the children more without the presence of her boss. We also explored some events from her childhood and teenage years. Feelings validated and insight-oriented interventions offered. Ms. De La Rosa ended session in good emotional and behavioral control. [FreeTextEntry2] : 1. develop coping strategies for depressive and anxious symptoms 2. develop narrative to assist in processing traumatic memories Once weekly psychodynamic treatment (with elements of DBT as necessary) with Dr. Card  [FreeTextEntry1] : 1. Continue individual psychotherapy with Dr. Card. 2. Continue medication management with Dr. Adams. 3. Continue couples therapy with Mr. Palomino. 4. Explore DBT Skills group work with Dr. Wilhelm. 5. Treatment plan and IAP to be revised in August 2024.
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
## 2024-03-18 NOTE — PLAN
[Emotionally Focused Couples Therapy] : Emotionally Focused Couples Therapy [de-identified] : Patient and partner explored difficulty expressing their needs in a way that the other person can hear. Patient and partner discussed startegies that would help them communicate more effectively. Emotionally focused therapy interventions used throughout the session. Patient and  were engaged and receptive to interventions used and ended the session in good emotional and behavioral control.  [FreeTextEntry1] : Biweekly couples therapy

## 2024-03-18 NOTE — END OF VISIT
[Duration of Psychotherapy Visit (minutes spent in synchronous communication): ____] : Duration of Psychotherapy Visit (minutes spent in synchronous communication): [unfilled] [Family Therapy With Client Present] : Family Therapy With Client Present  [Teletherapy Service Provided] : The services provided in this session were delivered via tele-therapy [After Hours] : After Hours [FreeTextEntry5] : Home [FreeTextEntry3] : Home [FreeTextEntry6] :  was present and a participant in couples counseling

## 2024-03-19 ENCOUNTER — APPOINTMENT (OUTPATIENT)
Dept: PSYCHIATRY | Facility: CLINIC | Age: 69
End: 2024-03-19
Payer: COMMERCIAL

## 2024-03-19 PROCEDURE — 90832 PSYTX W PT 30 MINUTES: CPT | Mod: 95

## 2024-03-19 PROCEDURE — 99214 OFFICE O/P EST MOD 30 MIN: CPT | Mod: 95

## 2024-03-19 NOTE — HISTORY OF PRESENT ILLNESS
[Not Applicable] : Not applicable [FreeTextEntry1] : Patient is seen and evaluated. Interval history reviewed. She reports diminished anxiety since starting buspirone. She is making good use of therapy with new therapist, Dr. Card. She takes propranolol daily with occasional extra dose for break through anxiety. She has been taking buspirone as a PRN - educated about taking this medication daily and not as a PRN.   [FreeTextEntry2] : See PPHx [FreeTextEntry3] : See PPHx

## 2024-03-19 NOTE — PHYSICAL EXAM
[Average] : average [Cooperative] : cooperative [Anxious] : anxious [Labile] : labile [Clear] : clear [Linear/Goal Directed] : linear/goal directed [WNL] : within normal limits [de-identified] : at times tearful
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
## 2024-03-19 NOTE — PLAN
[No] : No [Medication education provided] : Medication education provided. [Rationale for medication choices, possible risks/precautions, benefits, alternative treatment choices, and consequences of non-treatment discussed] : Rationale for medication choices, possible risks/precautions, benefits, alternative treatment choices, and consequences of non-treatment discussed with patient/family/caregiver  [Order(s) for medication placed in [facility] EHR] : Medication [FreeTextEntry5] : #) Continue buspirone 10mg PO BID #) Continue fluoxetine 80mg PO QDaily #) Continue propranolol 60mg PO BID PRN anxiety (On 11/7) #) Continue I/G therapy #) f/u in 1 month #) Call 911 or go to ER in case of emergency [FreeTextEntry4] : Discussion of relationship dynamics that result in anxiety

## 2024-03-19 NOTE — REASON FOR VISIT
[Continuity of care] : to ensure continuity of care [Telehealth (audio & video) - Individual/Group] : This visit was provided via telehealth using real-time 2-way audio visual technology. [Other Location: e.g. Home (Enter Location, City,State)___] : The provider was located at [unfilled]. [Home] : The patient, [unfilled], was located at home, [unfilled], at the time of the visit. [Verbal consent obtained from patient/other participant(s)] : Verbal consent for telehealth/telephonic services obtained from patient/other participant(s) [FreeTextEntry4] : 10:02 am [FreeTextEntry5] : 10:33 am [Patient] : Patient [FreeTextEntry1] : anxiety, sadness, trauma hx

## 2024-03-19 NOTE — END OF VISIT
[Duration of Psychotherapy Visit (minutes spent in synchronous communication): ____] : Duration of Psychotherapy Visit (minutes spent in synchronous communication): [unfilled] [Individual Psychotherapy for 16-37 minutes] : Individual Psychotherapy for 16-37 minutes [Teletherapy Service Provided] : The services provided in this session were delivered via tele-therapy [FreeTextEntry3] : Home ([place], [place]) [Licensed Clinician] : Licensed Clinician [FreeTextEntry5] : Home ([place], [place])

## 2024-03-19 NOTE — SOCIAL HISTORY
[No Known Substance Use] : no known substance use [FreeTextEntry1] : Domiciled in [place] with  and the younger of two adopted daughters from [place] (older daughter is away at college).  Family history of alcohol use disorder.  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

## 2024-03-19 NOTE — REASON FOR VISIT
[Patient preference] : as per patient preference [Continuing, patient not seen in-person within last 12 months (provide details below)] : Telehealth services are continuing, patient not seen in-person within last 12 months.  [Telehealth (audio & video) - Individual/Group] : This visit was provided via telehealth using real-time 2-way audio visual technology. [Medical Office: ([facility])___] : The provider was located at the medical office in [unfilled]. [Home] : The patient, [unfilled], was located at home, [unfilled], at the time of the visit. [Verbal consent obtained from patient/other participant(s)] : Verbal consent for telehealth/telephonic services obtained from patient/other participant(s) [Patient] : Patient [FreeTextEntry5] : 11:00a [FreeTextEntry4] : 10:30a [FreeTextEntry1] : Psychiatric Follow Up

## 2024-03-19 NOTE — PLAN
[FreeTextEntry2] : 1. develop coping strategies for depressive and anxious symptoms 2. develop narrative to assist in processing traumatic memories Once weekly psychodynamic treatment (with elements of DBT as necessary) with Dr. Card  [Dialectical Behavior Therapy] : Dialectical Behavior Therapy  [Psychodynamic Therapy] : Psychodynamic Therapy  [de-identified] : Ms. De La Rosa arrived on time for an individual session of remote psychotherapy. She explored ways her teenage and college experiences impacted her current sense of self. Exploration of how she used dance, substance and eating as coping mechanisms. Feelings validated and insight-oriented interventions offered. Ms. De La Rosa ended session in good emotional and behavioral control. [Recommended Frequency of Visits: ____] : Recommended frequency of visits: [unfilled] [Return in ____ week(s)] : Return in [unfilled] week(s) [FreeTextEntry1] : 1. Continue individual psychotherapy with Dr. Card. 2. Continue medication management with Dr. Adams. 3. Continue couples therapy with Mr. Palomino. 4. Explore DBT Skills group work with Dr. Wilhelm. 5. Treatment plan and IAP to be revised in August 2024.

## 2024-03-19 NOTE — PHYSICAL EXAM
[Average] : average [Cooperative] : cooperative [Depressed] : depressed [Anxious] : anxious [Full] : full [Clear] : clear [Linear/Goal Directed] : linear/goal directed [Preoccupations/Ruminations] : preoccupations/ruminations [Depressive] : depressive [None Reported] : none reported [WNL] : within normal limits [de-identified] : hypervigilant/mild paranoid response to news [de-identified] : psychological eval and neurological eval completed at [facility] in 2019 [de-identified] : concerned about memory and attention

## 2024-03-19 NOTE — DISCUSSION/SUMMARY
[FreeTextEntry1] : 68 year old , domiciled with family, employed  woman with history of Borderline Personality Disorder, JAMESON, who continues to report affective instability, destabilization, tearfulness, and feelings of low self-worth in the setting of multiple psychosocial stressors.  30 mins spent: Psychoeducation, supportive counseling, prescribing meds, documentation. Discussed interpersonal challenges, career plans. Shared decision to continue meds at current doses. Pt only take half tablet of propranolol and buspirone teice daily. Shared decision to continue fluoxetine at current doses given good tolerance and response. Patient is aware of risks vs benefits of medications and potential side effects.

## 2024-03-26 ENCOUNTER — APPOINTMENT (OUTPATIENT)
Dept: PSYCHIATRY | Facility: CLINIC | Age: 69
End: 2024-03-26

## 2024-03-26 ENCOUNTER — APPOINTMENT (OUTPATIENT)
Dept: PSYCHIATRY | Facility: CLINIC | Age: 69
End: 2024-03-26
Payer: COMMERCIAL

## 2024-03-26 PROCEDURE — 90834 PSYTX W PT 45 MINUTES: CPT | Mod: 95
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
## 2024-03-26 NOTE — PHYSICAL EXAM
[Average] : average [Cooperative] : cooperative [Depressed] : depressed [Anxious] : anxious [Full] : full [Clear] : clear [Linear/Goal Directed] : linear/goal directed [Preoccupations/Ruminations] : preoccupations/ruminations [None Reported] : none reported [Depressive] : depressive [de-identified] : hypervigilant/mild paranoid response to news [WNL] : within normal limits [de-identified] : concerned about memory and attention [de-identified] : psychological eval and neurological eval completed at [facility] in 2019

## 2024-03-26 NOTE — REASON FOR VISIT
[Continuity of care] : to ensure continuity of care [Telehealth (audio & video) - Individual/Group] : This visit was provided via telehealth using real-time 2-way audio visual technology. [Other Location: e.g. Home (Enter Location, City,State)___] : The provider was located at [unfilled]. [Home] : The patient, [unfilled], was located at home, [unfilled], at the time of the visit. [Verbal consent obtained from patient/other participant(s)] : Verbal consent for telehealth/telephonic services obtained from patient/other participant(s) [FreeTextEntry4] : 10:00 am [FreeTextEntry5] : 10:45 am [FreeTextEntry1] : anxiety, sadness, trauma hx [Patient] : Patient

## 2024-03-26 NOTE — END OF VISIT
[Duration of Psychotherapy Visit (minutes spent in synchronous communication): ____] : Duration of Psychotherapy Visit (minutes spent in synchronous communication): [unfilled] [Individual Psychotherapy for 38-52 minutes] : Individual Psychotherapy for 38 - 52 minutes [Teletherapy Service Provided] : The services provided in this session were delivered via tele-therapy [FreeTextEntry5] : Home ([place], [place]) [FreeTextEntry3] : Home ([place], [place]) [Licensed Clinician] : Licensed Clinician

## 2024-03-26 NOTE — PLAN
[FreeTextEntry2] : 1. develop coping strategies for depressive and anxious symptoms 2. develop narrative to assist in processing traumatic memories Once weekly psychodynamic treatment (with elements of DBT as necessary) with Dr. Card  [Dialectical Behavior Therapy] : Dialectical Behavior Therapy  [Psychodynamic Therapy] : Psychodynamic Therapy  [de-identified] : Ms. De La Rosa arrived on time for an individual session of remote psychotherapy. She had fallen ill with COVID-19 and initially felt the symptoms were not bad in past few days had experienced significant symptoms. Writer encouraged her to check in with her PCP. 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 The lessons were very successful but Ms. De La Rosa felt dismissed since her efforts were not acknowledged. 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 Feelings validated and DBT pros/cons skill used to work on seeing the dialectic of leaving and staying. Ms. De La Rosa ended session in good emotional and behavioral control. [Return in ____ week(s)] : Return in [unfilled] week(s) [Recommended Frequency of Visits: ____] : Recommended frequency of visits: [unfilled] [FreeTextEntry1] : 1. Continue individual psychotherapy with Dr. Card. 2. Continue medication management with Dr. Adams. 3. Continue couples therapy with Mr. Palomino. 4. Explore DBT Skills group work with Dr. Wilhelm. 5. Treatment plan and IAP to be revised in August 2024.

## 2024-04-02 ENCOUNTER — APPOINTMENT (OUTPATIENT)
Dept: PSYCHIATRY | Facility: CLINIC | Age: 69
End: 2024-04-02
Payer: COMMERCIAL

## 2024-04-02 PROCEDURE — 90834 PSYTX W PT 45 MINUTES: CPT | Mod: 95

## 2024-04-02 NOTE — RISK ASSESSMENT
[Yes] : Yes [Eating disorder] : eating disorder [Alcohol/Substance Use disorders] : alcohol/substance use disorders [Cluster B Personality disorder/traits] : cluster B personality disorder/traits [None known] : None known [Depressed mood/Anhedonia] : depressed mood/anhedonia [Low acute suicide risk] : Low acute suicide risk [No] : No [Not clinically indicated] : Safety Plan completed/updated (for individuals at risk): Not clinically indicated [Identifies reasons for living] : identifies reasons for living [Supportive social network of family or friends] : supportive social network of family or friends [Positive therapeutic relationships] : positive therapeutic relationships [Responsibility to children, family, or others] : responsibility to children, family, or others [Engaged in work or school] : engaged in work or school [FreeTextEntry8] : C-SSRS = 1, passive suicidal ideation but no active ideation, intent or plan [TextBox_32] : Pt endorses passive SI but denies active ideation, plan or intent.

## 2024-04-02 NOTE — PHYSICAL EXAM
[Average] : average [Cooperative] : cooperative [Depressed] : depressed [Anxious] : anxious [Full] : full [Clear] : clear [Linear/Goal Directed] : linear/goal directed [Preoccupations/Ruminations] : preoccupations/ruminations [Depressive] : depressive [None Reported] : none reported [WNL] : within normal limits [de-identified] : hypervigilant/mild paranoid response to news [de-identified] : concerned about memory and attention [de-identified] : psychological eval and neurological eval completed at [facility] in 2019
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
## 2024-04-02 NOTE — REASON FOR VISIT
[Telehealth (audio & video) - Individual/Group] : This visit was provided via telehealth using real-time 2-way audio visual technology. [Continuity of care] : to ensure continuity of care [Other Location: e.g. Home (Enter Location, City,State)___] : The provider was located at [unfilled]. [Home] : The patient, [unfilled], was located at home, [unfilled], at the time of the visit. [Verbal consent obtained from patient/other participant(s)] : Verbal consent for telehealth/telephonic services obtained from patient/other participant(s) [FreeTextEntry4] : 10:00 am [FreeTextEntry5] : 10:45 am [Patient] : Patient [FreeTextEntry1] : anxiety, sadness, trauma hx

## 2024-04-02 NOTE — PHYSICAL EXAM
[No] : No [0 - Never] : 0 - Never [5] : 5 [4] : 4 [3] : 3 [2] : 2 [] : No [Individual reports tobacco use during the last 30 days?] : Individual reports tobacco use during the last 30 days? No [FreeTextEntry1] : 0 [SchwartzScore] : 30

## 2024-04-02 NOTE — DISCUSSION/SUMMARY
[Denied] : Denied [No] : No [Yes: Details:___] : Yes: [unfilled] [Completed, copy requested] : Completed, copy requested [Not indicated at this time] : Not indicated at this time [Not clinically indicated] : Not clinically indicated [Patient has been tested for HIV?] : Patient has been tested for HIV [Patient has been tested for Hepatitis?] : Patient has been tested for hepatitis [Negative] : Negative [Current or past COVID-19 diagnosis?] : Patient had a present or past COVID-19 diagnosis [Vaccinated?] : is vaccinated [Education provided about COVID-19?] : Education provided about COVID-19 [Plan Revision] : Plan Revision [Articulate] : articulate [Attempting to realize their potential] : Attempting to realize their potential [Insightful] : insightful [Creative] : creative [Intelligent] : intelligent [Motivated to participate in treatment] : motivated to participate in treatment [Housing stability] : housing stability [Mental Health] : Mental Health [Initial] : Initial [every ___ weeks] : every [unfilled] weeks [Improvement in symptoms as evidenced by:] : Improvement in symptoms as evidenced by: [Psychiatric Provider/Prescriber] : Psychiatric Provider/Prescriber [Therapist] : Therapist [Other: ____] : [unfilled] [Yes] : Yes [Does patient use tobacco products?] : Patient does not use tobacco products [Does patient use medical marijuana?] : Patient does not use medical marijuana. [Patient would like to be tested/re-tested?] : patient would not like to be tested/re-tested [de-identified] : Dr. Rodney Wilhelm [FreeTextEntry2] : August 2024 [FreeTextEntry3] : 6/19/2008 [FreeTextEntry9] : raised in [employer] family [Continued - Progress made] : Continued - Progress made: [FreeTextEntry1] : mood stability [FreeTextEntry4] : Explore material around my parents, leaving home at 16 and not forgiving myself, feeling alone since then, easily feeling judged, difficulty adusting to lack of klonopin when upset [de-identified] : increase coping skills for distress tolerance including self-validation [de-identified] : March 2024 [de-identified] : develop narrative to help process significant life experiences [de-identified] : March 2024 [de-identified] : Dr. Adams [FreeTextEntry5] : individual therapy integrating psychodynamic and DBT approaches medication management consider group therapy for DBT Skills couples therapy with Kenn - going well, James and I have been going through difficult [de-identified] : Dr. Card [de-identified] : Couples with Kenn Palomino LCSW  [de-identified] : Decreased frequency of anxiety

## 2024-04-02 NOTE — RISK ASSESSMENT
[Yes] : Yes [Eating disorder] : eating disorder [Cluster B Personality disorder/traits] : cluster B personality disorder/traits [Alcohol/Substance Use disorders] : alcohol/substance use disorders [Depressed mood/Anhedonia] : depressed mood/anhedonia [None known] : None known [Low acute suicide risk] : Low acute suicide risk [No] : No [Not clinically indicated] : Safety Plan completed/updated (for individuals at risk): Not clinically indicated [Identifies reasons for living] : identifies reasons for living [Supportive social network of family or friends] : supportive social network of family or friends [Positive therapeutic relationships] : positive therapeutic relationships [Responsibility to children, family, or others] : responsibility to children, family, or others [Engaged in work or school] : engaged in work or school [FreeTextEntry8] : C-SSRS = 1, passive suicidal ideation but no active ideation, intent or plan [TextBox_32] : Pt endorses passive SI but denies active ideation, plan or intent.

## 2024-04-02 NOTE — END OF VISIT
[Duration of Psychotherapy Visit (minutes spent in synchronous communication): ____] : Duration of Psychotherapy Visit (minutes spent in synchronous communication): [unfilled] [Teletherapy Service Provided] : The services provided in this session were delivered via tele-therapy [Individual Psychotherapy for 38-52 minutes] : Individual Psychotherapy for 38 - 52 minutes [FreeTextEntry3] : Home ([place], [place]) [Licensed Clinician] : Licensed Clinician [FreeTextEntry5] : Home ([place], [place])
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
## 2024-04-02 NOTE — DISCUSSION/SUMMARY
[Denied] : Denied [No] : No [Yes: Details:___] : Yes: [unfilled] [Completed, copy requested] : Completed, copy requested [Not indicated at this time] : Not indicated at this time [Not clinically indicated] : Not clinically indicated [Patient has been tested for HIV?] : Patient has been tested for HIV [Patient has been tested for Hepatitis?] : Patient has been tested for hepatitis [Negative] : Negative [Current or past COVID-19 diagnosis?] : Patient had a present or past COVID-19 diagnosis [Vaccinated?] : is vaccinated [Education provided about COVID-19?] : Education provided about COVID-19 [Plan Revision] : Plan Revision [Articulate] : articulate [Attempting to realize their potential] : Attempting to realize their potential [Insightful] : insightful [Creative] : creative [Intelligent] : intelligent [Motivated to participate in treatment] : motivated to participate in treatment [Housing stability] : housing stability [Mental Health] : Mental Health [Initial] : Initial [every ___ weeks] : every [unfilled] weeks [Improvement in symptoms as evidenced by:] : Improvement in symptoms as evidenced by: [Psychiatric Provider/Prescriber] : Psychiatric Provider/Prescriber [Therapist] : Therapist [Other: ____] : [unfilled] [Yes] : Yes [Does patient use tobacco products?] : Patient does not use tobacco products [Does patient use medical marijuana?] : Patient does not use medical marijuana. [Patient would like to be tested/re-tested?] : patient would not like to be tested/re-tested [de-identified] : Dr. Rodney Wilhelm [FreeTextEntry2] : August 2024 [FreeTextEntry3] : 6/19/2008 [FreeTextEntry9] : raised in [employer] family [Continued - Progress made] : Continued - Progress made: [FreeTextEntry1] : mood stability [FreeTextEntry4] : Explore material around my parents, leaving home at 16 and not forgiving myself, feeling alone since then, easily feeling judged, difficulty adusting to lack of klonopin when upset [de-identified] : increase coping skills for distress tolerance including self-validation [de-identified] : March 2024 [de-identified] : develop narrative to help process significant life experiences [de-identified] : March 2024 [de-identified] : Dr. Adams [FreeTextEntry5] : individual therapy integrating psychodynamic and DBT approaches medication management consider group therapy for DBT Skills couples therapy with Kenn - going well, James and I have been going through difficult [de-identified] : Dr. Card [de-identified] : Couples with Kenn Palomino LCSW  [de-identified] : Decreased frequency of anxiety

## 2024-04-09 ENCOUNTER — APPOINTMENT (OUTPATIENT)
Dept: PSYCHIATRY | Facility: CLINIC | Age: 69
End: 2024-04-09
Payer: COMMERCIAL

## 2024-04-09 PROCEDURE — 90834 PSYTX W PT 45 MINUTES: CPT | Mod: 95

## 2024-04-09 PROCEDURE — 90847 FAMILY PSYTX W/PT 50 MIN: CPT | Mod: 95
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
## 2024-04-09 NOTE — END OF VISIT
[Duration of Psychotherapy Visit (minutes spent in synchronous communication): ____] : Duration of Psychotherapy Visit (minutes spent in synchronous communication): [unfilled] [Individual Psychotherapy for 38-52 minutes] : Individual Psychotherapy for 38 - 52 minutes [Teletherapy Service Provided] : The services provided in this session were delivered via tele-therapy [FreeTextEntry3] : Home ([place], [place]) [FreeTextEntry5] : Home ([place], [place]) [Licensed Clinician] : Licensed Clinician
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
## 2024-04-09 NOTE — PHYSICAL EXAM
[Average] : average [Cooperative] : cooperative [Depressed] : depressed [Anxious] : anxious [Full] : full [Clear] : clear [Linear/Goal Directed] : linear/goal directed [Preoccupations/Ruminations] : preoccupations/ruminations [Depressive] : depressive [None Reported] : none reported [WNL] : within normal limits [de-identified] : hypervigilant/mild paranoid response to news [de-identified] : concerned about memory and attention [de-identified] : psychological eval and neurological eval completed at [facility] in 2019

## 2024-04-11 NOTE — PLAN
[Emotionally Focused Couples Therapy] : Emotionally Focused Couples Therapy [de-identified] : Patient and partner discussed recent trip to [place], [place] and how the time away together made them feel more connected. Patient and partner explored new and alternative ways to feel connected with each other. Emotionally focused therapy interventions used throughout the session. Patient and  were engaged and receptive to interventions used and ended the session in good emotional and behavioral control.  [FreeTextEntry1] : Biweekly couples therapy

## 2024-04-16 ENCOUNTER — APPOINTMENT (OUTPATIENT)
Dept: PSYCHIATRY | Facility: CLINIC | Age: 69
End: 2024-04-16
Payer: COMMERCIAL

## 2024-04-16 PROCEDURE — 90834 PSYTX W PT 45 MINUTES: CPT | Mod: 95

## 2024-04-16 NOTE — END OF VISIT
[Duration of Psychotherapy Visit (minutes spent in synchronous communication): ____] : Duration of Psychotherapy Visit (minutes spent in synchronous communication): [unfilled] [Individual Psychotherapy for 38-52 minutes] : Individual Psychotherapy for 38 - 52 minutes [Teletherapy Service Provided] : The services provided in this session were delivered via tele-therapy [Licensed Clinician] : Licensed Clinician [FreeTextEntry3] : Home ([place], [place]) [FreeTextEntry5] : Home ([place], [place])
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
## 2024-04-16 NOTE — PHYSICAL EXAM
[Average] : average [Cooperative] : cooperative [Depressed] : depressed [Anxious] : anxious [Full] : full [Clear] : clear [Linear/Goal Directed] : linear/goal directed [Preoccupations/Ruminations] : preoccupations/ruminations [Depressive] : depressive [None Reported] : none reported [WNL] : within normal limits [de-identified] : hypervigilant/mild paranoid response to news [de-identified] : concerned about memory and attention [de-identified] : psychological eval and neurological eval completed at [facility] in 2019
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
## 2024-04-23 ENCOUNTER — APPOINTMENT (OUTPATIENT)
Dept: PSYCHIATRY | Facility: CLINIC | Age: 69
End: 2024-04-23
Payer: COMMERCIAL

## 2024-04-23 PROCEDURE — 90847 FAMILY PSYTX W/PT 50 MIN: CPT | Mod: 95

## 2024-04-23 PROCEDURE — 90833 PSYTX W PT W E/M 30 MIN: CPT | Mod: 95

## 2024-04-23 PROCEDURE — 99214 OFFICE O/P EST MOD 30 MIN: CPT | Mod: 95

## 2024-04-23 NOTE — REASON FOR VISIT
[Patient preference] : as per patient preference [Continuing, patient not seen in-person within last 12 months (provide details below)] : Telehealth services are continuing, patient not seen in-person within last 12 months.  [Telehealth (audio & video) - Individual/Group] : This visit was provided via telehealth using real-time 2-way audio visual technology. [Medical Office: ([facility])___] : The provider was located at the medical office in [unfilled]. [Home] : The patient, [unfilled], was located at home, [unfilled], at the time of the visit. [Verbal consent obtained from patient/other participant(s)] : Verbal consent for telehealth/telephonic services obtained from patient/other participant(s) [Patient] : Patient [FreeTextEntry4] : 11:00a [FreeTextEntry5] : 11:30a [FreeTextEntry1] : Psychiatric Follow Up

## 2024-04-23 NOTE — PHYSICAL EXAM
[Average] : average [Cooperative] : cooperative [Anxious] : anxious [Labile] : labile [Clear] : clear [Linear/Goal Directed] : linear/goal directed [WNL] : within normal limits [de-identified] : at times tearful

## 2024-04-23 NOTE — PLAN
[No] : No [Medication education provided] : Medication education provided. [Rationale for medication choices, possible risks/precautions, benefits, alternative treatment choices, and consequences of non-treatment discussed] : Rationale for medication choices, possible risks/precautions, benefits, alternative treatment choices, and consequences of non-treatment discussed with patient/family/caregiver  [Order(s) for medication placed in [facility] EHR] : Medication [FreeTextEntry4] : Discussion of relationship dynamics that result in anxiety [FreeTextEntry5] : #) Continue buspirone 10mg PO BID #) Continue fluoxetine 80mg PO QDaily #) Continue propranolol 60mg PO BID PRN anxiety (On 11/7) #) Continue I/G therapy #) f/u in 1 month #) Call 911 or go to ER in case of emergency

## 2024-04-23 NOTE — HISTORY OF PRESENT ILLNESS
[Not Applicable] : Not applicable [FreeTextEntry1] : Patient is seen and evaluated. Interval history reviewed. She shares that propranolol is helpful PRN for anxiety. She is only taking buspirone once daily. Reports having 'personality conflicts" at her job. She is making good use of therapy with new therapist, Dr. Card. She takes propranolol daily with occasional extra dose for break through anxiety.  [FreeTextEntry2] : See [facility] [FreeTextEntry3] : See [facility]

## 2024-04-23 NOTE — SOCIAL HISTORY
Claudia Lyons, SUKHDEV   5/19/2021 12:54 PM CDT Back to Top      Spoke to and notified pt of results and orders. Patient understands.Outside lab report     Chacho Oliveros MD  NPI Number(s): [id number]  Primary Group Name   [facility], [place] [address]   Phone Number [phone]    Fax [phone]     Emerson Montero MD   5/19/2021 10:25 AM CDT       Kidney function has declined further with creatinine going from 1.33 to 1.72 and GFR down from 52 to 38.  We need to look for a reason for this and he needs a renal U/S and a nephrology consult, Dr. Oliveros et al.  I will put in these orders.  Ok to be on same meds for now.       Patient had questions about his worsening GFR and if it was serious or not. He is scheduled for his Renal US on 06/01/2021 and called Dr. Oliveros's office. Dr. Oliveros cannot get him in until 07/28/2021. Patient will call oDmo's office again and try to get in to see one of his partners. If not, I advised him to call us back and let us know and we can direct him to another Nephrology practice to get him in sooner. He agrees with plan of care and all questions answered at this time.   [No Known Substance Use] : no known substance use [FreeTextEntry1] : Domiciled in [place] with  and the younger of two adopted daughters from [place] (older daughter is away at college).  Family history of alcohol use disorder.  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

## 2024-04-23 NOTE — DISCUSSION/SUMMARY
[FreeTextEntry1] : 68 year old , domiciled with family, employed  woman with history of Borderline Personality Disorder, JAMESON, who continues to report affective instability, destabilization, tearfulness, and feelings of low self-worth in the setting of multiple psychosocial stressors.  Time spent: prescribing meds, documentation. Discussed interpersonal challenges, career plans. Shared decision to continue meds at current doses. Pt only take half tablet of propranolol and buspirone twice daily. Shared decision to continue fluoxetine at current doses given good tolerance and response. Patient is aware of risks vs benefits of medications and potential side effects.    Psychotherpay: 16 mins Psychoeducation, supportive counseling, shared decision making. Discussed interpersonal conflicts at work and skills to encourage effective communication.

## 2024-04-29 NOTE — PLAN
[Emotionally Focused Couples Therapy] : Emotionally Focused Couples Therapy [de-identified] : Patient and partner discussed feelings related to parenting. Patient and partner expressed support for each other's views. Patient and partner discussed the challenges of working and living in [place] at this time in there lives. Emotionally focused therapy interventions used throughout the session. Patient and  were engaged and receptive to interventions used and ended the session in good emotional and behavioral control.  [FreeTextEntry1] : Biweekly couples therapy

## 2024-04-29 NOTE — REASON FOR VISIT
[Patient preference] : as per patient preference [Telehealth (audio & video) - Couple/Family] : This visit was provided via telehealth using real-time 2-way audio visual technology.  [Other Location: e.g. Home (Enter Location, City,State)___] : The provider was located at [unfilled]. [Home] : The patient, [unfilled], was located at home, [unfilled], at the time of the visit. [Verbal consent obtained from patient/other participant(s)] : Verbal consent for telehealth/telephonic services obtained from patient/other participant(s) [FreeTextEntry4] : 6:30pm [FreeTextEntry1] : James [place] [FreeTextEntry5] : 7:15pm [Patient with collateral] : Patient with collateral  [Spouse] : spouse

## 2024-04-30 ENCOUNTER — APPOINTMENT (OUTPATIENT)
Dept: PSYCHIATRY | Facility: CLINIC | Age: 69
End: 2024-04-30

## 2024-05-03 ENCOUNTER — APPOINTMENT (OUTPATIENT)
Dept: PSYCHIATRY | Facility: CLINIC | Age: 69
End: 2024-05-03
Payer: COMMERCIAL

## 2024-05-03 PROCEDURE — 90832 PSYTX W PT 30 MINUTES: CPT | Mod: 95

## 2024-05-06 NOTE — REASON FOR VISIT
[Continuity of care] : to ensure continuity of care [Telehealth (audio & video) - Individual/Group] : This visit was provided via telehealth using real-time 2-way audio visual technology. [Other Location: e.g. Home (Enter Location, City,State)___] : The provider was located at [unfilled]. [Home] : The patient, [unfilled], was located at home, [unfilled], at the time of the visit. [Verbal consent obtained from patient/other participant(s)] : Verbal consent for telehealth/telephonic services obtained from patient/other participant(s) [FreeTextEntry4] : 1:00 pm [FreeTextEntry5] : 1:30 pm [Patient] : Patient [FreeTextEntry1] : anxiety, sadness, trauma hx

## 2024-05-06 NOTE — END OF VISIT
[Duration of Psychotherapy Visit (minutes spent in synchronous communication): ____] : Duration of Psychotherapy Visit (minutes spent in synchronous communication): [unfilled] [Individual Psychotherapy for 16-37 minutes] : Individual Psychotherapy for 16-37 minutes [Teletherapy Service Provided] : The services provided in this session were delivered via tele-therapy [FreeTextEntry3] : Home ([place], [place]) [FreeTextEntry5] : Home ([place], [place]) [Licensed Clinician] : Licensed Clinician

## 2024-05-06 NOTE — PLAN
[FreeTextEntry2] : 1. develop coping strategies for depressive and anxious symptoms 2. develop narrative to assist in processing traumatic memories Once weekly psychodynamic treatment (with elements of DBT as necessary) with Dr. Card  [Dialectical Behavior Therapy] : Dialectical Behavior Therapy  [Psychodynamic Therapy] : Psychodynamic Therapy  [de-identified] : Ms. De La Rosa arrived on time for a rescheduled individual session of remote psychotherapy. We explored an upcoming meeting with her work supervisor. Ms. De La Rosa had requested a third party be present and had written a letter. We reviewed the letter and she felt it would be productive to send the letter in advance of the meeting. She also explored her diagnosis of borderline personality disorder and we related that to early trauma. The difficulty of working with authority figures placed in that context. Ms. De La Rosa ended session in good emotional and behavioral control. [Recommended Frequency of Visits: ____] : Recommended frequency of visits: [unfilled] [Return in ____ week(s)] : Return in [unfilled] week(s) [FreeTextEntry1] : 1. Continue individual psychotherapy with Dr. Card. 2. Continue medication management with Dr. Adams. 3. Continue couples therapy with Mr. Palomino. 4. Explore DBT Skills group work with Dr. Wilhelm. 5. Treatment plan and IAP to be revised in August 2024.

## 2024-05-06 NOTE — PHYSICAL EXAM
[Average] : average [Cooperative] : cooperative [Depressed] : depressed [Anxious] : anxious [Full] : full [Clear] : clear [Linear/Goal Directed] : linear/goal directed [Preoccupations/Ruminations] : preoccupations/ruminations [Depressive] : depressive [None Reported] : none reported [WNL] : within normal limits [de-identified] : hypervigilant/mild paranoid response to news [de-identified] : concerned about memory and attention [de-identified] : psychological eval and neurological eval completed at [facility] in 2019

## 2024-05-07 ENCOUNTER — APPOINTMENT (OUTPATIENT)
Dept: PSYCHIATRY | Facility: CLINIC | Age: 69
End: 2024-05-07
Payer: MEDICARE

## 2024-05-07 PROCEDURE — 90834 PSYTX W PT 45 MINUTES: CPT | Mod: 95
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
## 2024-05-07 NOTE — PHYSICAL EXAM
[Average] : average [Cooperative] : cooperative [Depressed] : depressed [Anxious] : anxious [Full] : full [Clear] : clear [Linear/Goal Directed] : linear/goal directed [Preoccupations/Ruminations] : preoccupations/ruminations [Depressive] : depressive [None Reported] : none reported [WNL] : within normal limits [de-identified] : hypervigilant/mild paranoid response to news [de-identified] : concerned about memory and attention [de-identified] : psychological eval and neurological eval completed at [facility] in 2019

## 2024-05-07 NOTE — PLAN
[FreeTextEntry2] : 1. develop coping strategies for depressive and anxious symptoms 2. develop narrative to assist in processing traumatic memories Once weekly psychodynamic treatment (with elements of DBT as necessary) with Dr. Card  [Dialectical Behavior Therapy] : Dialectical Behavior Therapy  [Psychodynamic Therapy] : Psychodynamic Therapy  [de-identified] : Ms. De La Rosa arrived on time for a weekly remote session of individual psychotherapy. We explored her feelings about a meeting with her work supervisor. We noticed self-critical and punitive thoughts that were being activated. 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 Ms. De La Rosa ended session in good emotional and behavioral control. [Recommended Frequency of Visits: ____] : Recommended frequency of visits: [unfilled] [Return in ____ week(s)] : Return in [unfilled] week(s) [FreeTextEntry1] : 1. Continue individual psychotherapy with Dr. Card. 2. Continue medication management with Dr. Adams. 3. Continue couples therapy with Mr. Palomino. 4. Explore DBT Skills group work with Dr. Wilhelm. 5. Treatment plan and IAP to be revised in August 2024.

## 2024-05-07 NOTE — END OF VISIT
[Duration of Psychotherapy Visit (minutes spent in synchronous communication): ____] : Duration of Psychotherapy Visit (minutes spent in synchronous communication): [unfilled] [Individual Psychotherapy for 38-52 minutes] : Individual Psychotherapy for 38 - 52 minutes [Teletherapy Service Provided] : The services provided in this session were delivered via tele-therapy [FreeTextEntry3] : Home ([place], [place]) [FreeTextEntry5] : Home ([place], [place]) [Licensed Clinician] : Licensed Clinician

## 2024-05-08 ENCOUNTER — APPOINTMENT (OUTPATIENT)
Dept: PSYCHIATRY | Facility: CLINIC | Age: 69
End: 2024-05-08
Payer: MEDICARE

## 2024-05-08 PROCEDURE — 90847 FAMILY PSYTX W/PT 50 MIN: CPT | Mod: 95
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
## 2024-05-14 ENCOUNTER — APPOINTMENT (OUTPATIENT)
Dept: PSYCHIATRY | Facility: CLINIC | Age: 69
End: 2024-05-14
Payer: COMMERCIAL

## 2024-05-14 PROCEDURE — 90834 PSYTX W PT 45 MINUTES: CPT | Mod: 95
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
## 2024-05-14 NOTE — PLAN
[Dialectical Behavior Therapy] : Dialectical Behavior Therapy  [Psychodynamic Therapy] : Psychodynamic Therapy  [Recommended Frequency of Visits: ____] : Recommended frequency of visits: [unfilled] [Return in ____ week(s)] : Return in [unfilled] week(s) [FreeTextEntry2] : 1. develop coping strategies for depressive and anxious symptoms 2. develop narrative to assist in processing traumatic memories Once weekly psychodynamic treatment (with elements of DBT as necessary) with Dr. Card  [de-identified] : Ms. De La Rosa arrived on time for a weekly remote session of individual psychotherapy. 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 We explored ways this was different from talking with her work supervisor. We also explored possible ways she could transition these skills into a private art camp. Ms. De La Rosa ended session in good emotional and behavioral control. [FreeTextEntry1] : 1. Continue individual psychotherapy with Dr. Card. 2. Continue medication management with Dr. Adams. 3. Continue couples therapy with Mr. Palomino. 4. Explore DBT Skills group work with Dr. Wilhelm. 5. Treatment plan and IAP to be revised in August 2024.

## 2024-05-14 NOTE — PHYSICAL EXAM
[Average] : average [Cooperative] : cooperative [Depressed] : depressed [Anxious] : anxious [Full] : full [Clear] : clear [Linear/Goal Directed] : linear/goal directed [Preoccupations/Ruminations] : preoccupations/ruminations [Depressive] : depressive [None Reported] : none reported [WNL] : within normal limits [de-identified] : hypervigilant/mild paranoid response to news [de-identified] : concerned about memory and attention [de-identified] : psychological eval and neurological eval completed at [facility] in 2019

## 2024-05-14 NOTE — END OF VISIT
[Duration of Psychotherapy Visit (minutes spent in synchronous communication): ____] : Duration of Psychotherapy Visit (minutes spent in synchronous communication): [unfilled] [Individual Psychotherapy for 38-52 minutes] : Individual Psychotherapy for 38 - 52 minutes [Teletherapy Service Provided] : The services provided in this session were delivered via tele-therapy [Licensed Clinician] : Licensed Clinician [FreeTextEntry3] : Home ([place], [place]) [FreeTextEntry5] : Home ([place], [place])

## 2024-05-21 ENCOUNTER — APPOINTMENT (OUTPATIENT)
Dept: PSYCHIATRY | Facility: CLINIC | Age: 69
End: 2024-05-21
Payer: MEDICARE

## 2024-05-21 PROCEDURE — 90834 PSYTX W PT 45 MINUTES: CPT | Mod: 95

## 2024-05-21 NOTE — PLAN
[Psychodynamic Therapy] : Psychodynamic Therapy  [Recommended Frequency of Visits: ____] : Recommended frequency of visits: [unfilled] [Return in ____ week(s)] : Return in [unfilled] week(s) [FreeTextEntry2] : 1. develop coping strategies for depressive and anxious symptoms 2. develop narrative to assist in processing traumatic memories Once weekly psychodynamic treatment (with elements of DBT as necessary) with Dr. Card Couples therapy with Mr. Palomino. Medication management with Dr. Wilhelm  [de-identified] : Ms. De La Rosa arrived on time for a weekly remote session of individual psychotherapy. 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 Accomplishments rarely left her feeling fulfilled. We explored the idea of covert narcissism as a possible way to view this empty feeling. Ms. De La Rosa ended session in good emotional and behavioral control. [FreeTextEntry1] : 1. Continue individual psychotherapy with Dr. Card. 2. Continue medication management with Dr. Adams. 3. Continue couples therapy with Mr. Palomino. 4. Treatment plan and IAP to be revised in August 2024.
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
## 2024-05-21 NOTE — PHYSICAL EXAM
[Average] : average [Cooperative] : cooperative [Depressed] : depressed [Anxious] : anxious [Full] : full [Clear] : clear [Linear/Goal Directed] : linear/goal directed [Preoccupations/Ruminations] : preoccupations/ruminations [Depressive] : depressive [None Reported] : none reported [WNL] : within normal limits [de-identified] : hypervigilant/mild paranoid response to news [de-identified] : concerned about memory and attention [de-identified] : psychological eval and neurological eval completed at [facility] in 2019

## 2024-05-21 NOTE — END OF VISIT
[Duration of Psychotherapy Visit (minutes spent in synchronous communication): ____] : Duration of Psychotherapy Visit (minutes spent in synchronous communication): [unfilled] [Individual Psychotherapy for 38-52 minutes] : Individual Psychotherapy for 38 - 52 minutes [Teletherapy Service Provided] : The services provided in this session were delivered via tele-therapy [Licensed Clinician] : Licensed Clinician [FreeTextEntry3] : Home ([place], [place]) [FreeTextEntry5] : Home ([place], [place])

## 2024-05-28 ENCOUNTER — APPOINTMENT (OUTPATIENT)
Dept: PSYCHIATRY | Facility: CLINIC | Age: 69
End: 2024-05-28
Payer: COMMERCIAL

## 2024-05-28 PROCEDURE — 90834 PSYTX W PT 45 MINUTES: CPT | Mod: 95
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
## 2024-05-28 NOTE — PHYSICAL EXAM
[Average] : average [Cooperative] : cooperative [Depressed] : depressed [Anxious] : anxious [Full] : full [Clear] : clear [Linear/Goal Directed] : linear/goal directed [Preoccupations/Ruminations] : preoccupations/ruminations [Depressive] : depressive [None Reported] : none reported [WNL] : within normal limits [de-identified] : hypervigilant/mild paranoid response to news [de-identified] : concerned about memory and attention [de-identified] : psychological eval and neurological eval completed at [facility] in 2019

## 2024-05-28 NOTE — END OF VISIT
[Duration of Psychotherapy Visit (minutes spent in synchronous communication): ____] : Duration of Psychotherapy Visit (minutes spent in synchronous communication): [unfilled] [Individual Psychotherapy for 38-52 minutes] : Individual Psychotherapy for 38 - 52 minutes [Teletherapy Service Provided] : The services provided in this session were delivered via tele-therapy [Licensed Clinician] : Licensed Clinician [FreeTextEntry3] : Home ([place], [place]) [FreeTextEntry5] : Home ([place], [place])

## 2024-05-28 NOTE — PLAN
[Psychodynamic Therapy] : Psychodynamic Therapy  [Recommended Frequency of Visits: ____] : Recommended frequency of visits: [unfilled] [Return in ____ week(s)] : Return in [unfilled] week(s) [FreeTextEntry2] : 1. develop coping strategies for depressive and anxious symptoms 2. develop narrative to assist in processing traumatic memories Once weekly psychodynamic treatment (with elements of DBT as necessary) with Dr. Card Couples therapy with Mr. Palomino. Medication management with Dr. Wilhelm  [de-identified] : Ms. eD La Rosa arrived on time for a weekly remote session of individual psychotherapy. Her older sister Emilie and her  visited for the long weekend. She her feelings about the visit and how it related to patterns when they were children. She noted a similarity in her sensitivity and what she regarded as paranoia by her mother, a fear of being judged by others. She also explored some frustration with her current work situation and how to move foward. Ms. De La Rosa ended session in good emotional and behavioral control. [FreeTextEntry1] : 1. Continue individual psychotherapy with Dr. Card. 2. Continue medication management with Dr. Adams. 3. Continue couples therapy with Mr. Palomino. 4. Treatment plan and IAP to be revised in August 2024.

## 2024-06-04 ENCOUNTER — APPOINTMENT (OUTPATIENT)
Dept: PSYCHIATRY | Facility: CLINIC | Age: 69
End: 2024-06-04
Payer: MEDICARE

## 2024-06-04 PROCEDURE — 90847 FAMILY PSYTX W/PT 50 MIN: CPT | Mod: 95

## 2024-06-04 PROCEDURE — 90834 PSYTX W PT 45 MINUTES: CPT | Mod: 95

## 2024-06-04 PROCEDURE — 99214 OFFICE O/P EST MOD 30 MIN: CPT | Mod: 95

## 2024-06-04 PROCEDURE — 90833 PSYTX W PT W E/M 30 MIN: CPT | Mod: 95

## 2024-06-04 PROCEDURE — G2211 COMPLEX E/M VISIT ADD ON: CPT | Mod: 95

## 2024-06-04 RX ORDER — PROPRANOLOL HYDROCHLORIDE 60 MG/1
60 TABLET ORAL TWICE DAILY
Qty: 180 | Refills: 0 | Status: ACTIVE | COMMUNITY
Start: 2023-11-07 | End: 1900-01-01

## 2024-06-04 RX ORDER — METHYLPHENIDATE HYDROCHLORIDE 5 MG/1
5 TABLET ORAL
Qty: 60 | Refills: 0 | Status: ACTIVE | COMMUNITY
Start: 2024-06-04 | End: 1900-01-01

## 2024-06-04 RX ORDER — BUSPIRONE HYDROCHLORIDE 10 MG/1
10 TABLET ORAL
Qty: 180 | Refills: 0 | Status: ACTIVE | COMMUNITY
Start: 2023-12-05 | End: 1900-01-01

## 2024-06-04 RX ORDER — FLUOXETINE HYDROCHLORIDE 40 MG/1
40 CAPSULE ORAL DAILY
Qty: 180 | Refills: 0 | Status: ACTIVE | COMMUNITY

## 2024-06-04 NOTE — END OF VISIT
[Duration of Psychotherapy Visit (minutes spent in synchronous communication): ____] : Duration of Psychotherapy Visit (minutes spent in synchronous communication): [unfilled] [Individual Psychotherapy for 38-52 minutes] : Individual Psychotherapy for 38 - 52 minutes [Teletherapy Service Provided] : The services provided in this session were delivered via tele-therapy [FreeTextEntry3] : Home ([place], [place]) [FreeTextEntry5] : Home ([place], [place]) [Licensed Clinician] : Licensed Clinician

## 2024-06-04 NOTE — SOCIAL HISTORY
[No Known Substance Use] : no known substance use [FreeTextEntry1] : Domiciled in [place] with  and the younger of two adopted daughters from [place] (older daughter is away at college).  Family history of alcohol use disorder.  

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
## 2024-06-04 NOTE — PHYSICAL EXAM
[Average] : average [Cooperative] : cooperative [Anxious] : anxious [Labile] : labile [Clear] : clear [Linear/Goal Directed] : linear/goal directed [WNL] : within normal limits [de-identified] : at times tearful

## 2024-06-04 NOTE — HISTORY OF PRESENT ILLNESS
[Not Applicable] : Not applicable [FreeTextEntry1] : Patient is seen and evaluated. Interval history reviewed. She reports overall improvement. She shares that propranolol is helpful PRN for anxiety takes half a tab morning and evening and one mid-day when needed. Buspirone has also been helpful - she is now taking it twice daily. She continues to have difficulty with concentration which impacts her ability to be effective which ultimately impacts her self-esteem.  [FreeTextEntry2] : See PPHx [FreeTextEntry3] : See PPHx

## 2024-06-04 NOTE — PHYSICAL EXAM
[Average] : average [Cooperative] : cooperative [Depressed] : depressed [Anxious] : anxious [Full] : full [Clear] : clear [Linear/Goal Directed] : linear/goal directed [Preoccupations/Ruminations] : preoccupations/ruminations [Depressive] : depressive [None Reported] : none reported [WNL] : within normal limits [de-identified] : hypervigilant/mild paranoid response to news [de-identified] : concerned about memory and attention [de-identified] : psychological eval and neurological eval completed at [facility] in 2019

## 2024-06-04 NOTE — PLAN
[No] : No [Medication education provided] : Medication education provided. [Rationale for medication choices, possible risks/precautions, benefits, alternative treatment choices, and consequences of non-treatment discussed] : Rationale for medication choices, possible risks/precautions, benefits, alternative treatment choices, and consequences of non-treatment discussed with patient/family/caregiver  [Order(s) for medication placed in [facility] EHR] : Medication [FreeTextEntry4] : Discussion of relationship dynamics that result in anxiety [FreeTextEntry5] : #) Initiate methylphenidate 5mg PO Qam - taken as needed #) Continue buspirone 10mg PO BID #) Continue fluoxetine 80mg PO QDaily #) Continue propranolol 60mg PO BID PRN anxiety (On 11/7) #) Continue I/G therapy #) f/u in 1 month #) Call 911 or go to ER in case of emergency
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
## 2024-06-04 NOTE — REASON FOR VISIT
[Patient preference] : as per patient preference [Continuing, patient not seen in-person within last 12 months (provide details below)] : Telehealth services are continuing, patient not seen in-person within last 12 months.  [Telehealth (audio & video) - Individual/Group] : This visit was provided via telehealth using real-time 2-way audio visual technology. [Medical Office: ([facility])___] : The provider was located at the medical office in [unfilled]. [Home] : The patient, [unfilled], was located at home, [unfilled], at the time of the visit. [Verbal consent obtained from patient/other participant(s)] : Verbal consent for telehealth/telephonic services obtained from patient/other participant(s) [Patient] : Patient [FreeTextEntry4] : 11:00a [FreeTextEntry5] : 11:31a [FreeTextEntry1] : Psychiatric Follow Up

## 2024-06-05 NOTE — PLAN
[Emotionally Focused Couples Therapy] : Emotionally Focused Couples Therapy [de-identified] : Patient and  discussed prioritizing spending time with each other, while trying to balance their jobs and need for rest and relaxation. Emotionally focused therapy interventions used throughout the session. Patient and  were engaged and receptive to interventions used and ended the session in good emotional and behavioral control.  [FreeTextEntry1] : Biweekly couples therapy

## 2024-06-11 ENCOUNTER — APPOINTMENT (OUTPATIENT)
Dept: PSYCHIATRY | Facility: CLINIC | Age: 69
End: 2024-06-11
Payer: COMMERCIAL

## 2024-06-11 PROCEDURE — 90834 PSYTX W PT 45 MINUTES: CPT | Mod: 95
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
## 2024-06-11 NOTE — PHYSICAL EXAM
[Average] : average [Cooperative] : cooperative [Depressed] : depressed [Anxious] : anxious [Full] : full [Clear] : clear [Linear/Goal Directed] : linear/goal directed [Preoccupations/Ruminations] : preoccupations/ruminations [Depressive] : depressive [None Reported] : none reported [WNL] : within normal limits [de-identified] : hypervigilant/mild paranoid response to news [de-identified] : concerned about memory and attention [de-identified] : psychological eval and neurological eval completed at [facility] in 2019

## 2024-06-11 NOTE — REASON FOR VISIT
[Continuity of care] : to ensure continuity of care [Telehealth (audio & video) - Individual/Group] : This visit was provided via telehealth using real-time 2-way audio visual technology. [Other Location: e.g. Home (Enter Location, City,State)___] : The provider was located at [unfilled]. [Home] : The patient, [unfilled], was located at home, [unfilled], at the time of the visit. [Verbal consent obtained from patient/other participant(s)] : Verbal consent for telehealth/telephonic services obtained from patient/other participant(s) [FreeTextEntry4] : 10:10 am [FreeTextEntry5] : 10:48 am [Patient] : Patient [FreeTextEntry1] : anxiety, sadness, trauma hx

## 2024-06-11 NOTE — END OF VISIT
[Duration of Psychotherapy Visit (minutes spent in synchronous communication): ____] : Duration of Psychotherapy Visit (minutes spent in synchronous communication): [unfilled] [Individual Psychotherapy for 38-52 minutes] : Individual Psychotherapy for 38 - 52 minutes [Teletherapy Service Provided] : The services provided in this session were delivered via tele-therapy [FreeTextEntry3] : Home ([place], [place]) [FreeTextEntry5] : Home ([place], [place]) [Licensed Clinician] : Licensed Clinician

## 2024-06-11 NOTE — PLAN
[FreeTextEntry2] : 1. develop coping strategies for depressive and anxious symptoms 2. develop narrative to assist in processing traumatic memories Once weekly psychodynamic treatment (with elements of DBT as necessary) with Dr. Card Couple therapy with Mr. Palomino. Medication management with Dr. Wilhelm  [Dialectical Behavior Therapy] : Dialectical Behavior Therapy  [Psychodynamic Therapy] : Psychodynamic Therapy  [de-identified] : Ms. De La Rosa arrived a few minutes late for a weekly remote session of individual psychotherapy. She had met with a group member from her online [employer] (Adult Children of [employer]) group. She found it very meaningful to meet in person. We explored the possibility that we could meet in person as well. 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 Feelings validated and insight-oriented and DBT skills explored.Ms. De La Rosa ended session in good emotional and behavioral control. [Recommended Frequency of Visits: ____] : Recommended frequency of visits: [unfilled] [Return in ____ week(s)] : Return in [unfilled] week(s) [FreeTextEntry1] : 1. Continue individual psychotherapy with Dr. Card. 2. Continue medication management with Dr. Adams. 3. Continue couple therapy with Mr. Palomino. 4. Treatment plan and IAP to be revised in August 2024.

## 2024-06-14 RX ORDER — GABAPENTIN 100 MG/1
100 CAPSULE ORAL 3 TIMES DAILY
Qty: 90 | Refills: 1 | Status: ACTIVE | COMMUNITY
Start: 1900-01-01 | End: 1900-01-01

## 2024-06-18 ENCOUNTER — APPOINTMENT (OUTPATIENT)
Dept: PSYCHIATRY | Facility: CLINIC | Age: 69
End: 2024-06-18
Payer: MEDICARE

## 2024-06-18 DIAGNOSIS — F43.23 ADJUSTMENT DISORDER WITH MIXED ANXIETY AND DEPRESSED MOOD: ICD-10-CM

## 2024-06-18 PROCEDURE — 90834 PSYTX W PT 45 MINUTES: CPT | Mod: 95

## 2024-06-18 PROCEDURE — 90847 FAMILY PSYTX W/PT 50 MIN: CPT | Mod: 95

## 2024-06-18 NOTE — END OF VISIT
[Duration of Psychotherapy Visit (minutes spent in synchronous communication): ____] : Duration of Psychotherapy Visit (minutes spent in synchronous communication): [unfilled] [Individual Psychotherapy for 38-52 minutes] : Individual Psychotherapy for 38 - 52 minutes [Teletherapy Service Provided] : The services provided in this session were delivered via tele-therapy [FreeTextEntry3] : Home ([place], [place]) [FreeTextEntry5] : Home ([place], [place]) [Licensed Clinician] : Licensed Clinician

## 2024-06-18 NOTE — PLAN
[FreeTextEntry2] : 1. develop coping strategies for depressive and anxious symptoms 2. develop narrative to assist in processing traumatic memories Once weekly psychodynamic treatment (with elements of DBT as necessary) with Dr. Card Couple therapy with Mr. Palomino. Medication management with Dr. Wilhelm  [Dialectical Behavior Therapy] : Dialectical Behavior Therapy  [Psychodynamic Therapy] : Psychodynamic Therapy  [Psychoeducation] : Psychoeducation  [de-identified] : Ms. De La Rosa arrived on time for a weekly remote session of individual psychotherapy. 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 We spent some time exploring her continuing struggle with symptoms of PTSD in terms of flashbacks, negative automatic thoughts, emotional dysregulation and nightmares. She was curious about EMDR and TF-CBT has possible add-ons to address these continuing symptoms. Feelings validated, psychoeducation provided and insight-oriented interventiosn offered.Ms. De La Rosa ended session in good emotional and behavioral control. [Recommended Frequency of Visits: ____] : Recommended frequency of visits: [unfilled] [Return in ____ week(s)] : Return in [unfilled] week(s) [FreeTextEntry1] : 1. Continue individual psychotherapy with Dr. Card. 2. Continue medication management with Dr. Adams. 3. Continue couple therapy with Mr. Palomino. 4. Treatment plan and IAP to be revised in August 2024.

## 2024-06-18 NOTE — PHYSICAL EXAM
[Average] : average [Cooperative] : cooperative [Depressed] : depressed [Anxious] : anxious [Full] : full [Clear] : clear [Linear/Goal Directed] : linear/goal directed [Preoccupations/Ruminations] : preoccupations/ruminations [Depressive] : depressive [None Reported] : none reported [WNL] : within normal limits [de-identified] : hypervigilant/mild paranoid response to news [de-identified] : concerned about memory and attention [de-identified] : psychological eval and neurological eval completed at [facility] in 2019
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
## 2024-06-19 PROBLEM — F43.23 ADJUSTMENT DISORDER WITH MIXED ANXIETY AND DEPRESSED MOOD: Status: ACTIVE | Noted: 2021-03-05

## 2024-06-19 NOTE — PLAN
[Emotionally Focused Couples Therapy] : Emotionally Focused Couples Therapy [de-identified] : Patient and  discussed need to communicate more effectively outside of therapy and the need to spend more quality time together. Emotionally focused therapy interventions used throughout the session. Patient and  were engaged and receptive to interventions used and ended the session in good emotional and behavioral control.  [FreeTextEntry1] : Biweekly couples therapy

## 2024-06-25 ENCOUNTER — APPOINTMENT (OUTPATIENT)
Dept: PSYCHIATRY | Facility: CLINIC | Age: 69
End: 2024-06-25
Payer: COMMERCIAL

## 2024-06-25 PROCEDURE — 90834 PSYTX W PT 45 MINUTES: CPT | Mod: 95

## 2024-07-02 ENCOUNTER — APPOINTMENT (OUTPATIENT)
Dept: PSYCHIATRY | Facility: CLINIC | Age: 69
End: 2024-07-02
Payer: MEDICARE

## 2024-07-02 DIAGNOSIS — R41.840 ATTENTION AND CONCENTRATION DEFICIT: ICD-10-CM

## 2024-07-02 PROBLEM — F60.3 BORDERLINE PERSONALITY DISORDER: Status: ACTIVE | Noted: 2020-12-30

## 2024-07-02 PROBLEM — F34.1 DYSTHYMIC DISORDER: Status: ACTIVE | Noted: 2021-02-01

## 2024-07-02 PROBLEM — F41.1 GENERALIZED ANXIETY DISORDER: Status: ACTIVE | Noted: 2020-12-30

## 2024-07-02 PROCEDURE — 90847 FAMILY PSYTX W/PT 50 MIN: CPT | Mod: 95

## 2024-07-02 PROCEDURE — 90834 PSYTX W PT 45 MINUTES: CPT | Mod: 95

## 2024-07-09 ENCOUNTER — APPOINTMENT (OUTPATIENT)
Dept: PSYCHIATRY | Facility: CLINIC | Age: 69
End: 2024-07-09
Payer: COMMERCIAL

## 2024-07-09 DIAGNOSIS — F43.23 ADJUSTMENT DISORDER WITH MIXED ANXIETY AND DEPRESSED MOOD: ICD-10-CM

## 2024-07-09 PROCEDURE — 90834 PSYTX W PT 45 MINUTES: CPT | Mod: 95

## 2024-07-16 ENCOUNTER — APPOINTMENT (OUTPATIENT)
Dept: PSYCHIATRY | Facility: CLINIC | Age: 69
End: 2024-07-16

## 2024-07-23 ENCOUNTER — APPOINTMENT (OUTPATIENT)
Dept: PSYCHIATRY | Facility: CLINIC | Age: 69
End: 2024-07-23
Payer: COMMERCIAL

## 2024-07-23 DIAGNOSIS — F34.1 DYSTHYMIC DISORDER: ICD-10-CM

## 2024-07-23 PROCEDURE — 90834 PSYTX W PT 45 MINUTES: CPT | Mod: 95

## 2024-07-23 NOTE — END OF VISIT
[Duration of Psychotherapy Visit (minutes spent in synchronous communication): ____] : Duration of Psychotherapy Visit (minutes spent in synchronous communication): [unfilled] [Individual Psychotherapy for 38-52 minutes] : Individual Psychotherapy for 38 - 52 minutes [Teletherapy Service Provided] : The services provided in this session were delivered via tele-therapy [FreeTextEntry3] : Home ([place], [place]) [FreeTextEntry5] : Home ([place], [place]) [Licensed Clinician] : Licensed Clinician

## 2024-07-23 NOTE — PHYSICAL EXAM
[Average] : average [Cooperative] : cooperative [Depressed] : depressed [Anxious] : anxious [Full] : full [Clear] : clear [Linear/Goal Directed] : linear/goal directed [Preoccupations/Ruminations] : preoccupations/ruminations [Depressive] : depressive [None Reported] : none reported [WNL] : within normal limits [de-identified] : hypervigilant/mild paranoid response to news [de-identified] : concerned about memory and attention [de-identified] : psychological eval and neurological eval completed at [facility] in 2019
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
## 2024-07-23 NOTE — PLAN
[FreeTextEntry2] : 1. develop coping strategies for depressive and anxious symptoms 2. develop narrative to assist in processing traumatic memories Once weekly psychodynamic treatment (with elements of DBT as necessary) with Dr. Card Couple therapy with Mr. Palomino. Medication management with Dr. Wilhelm  [Dialectical Behavior Therapy] : Dialectical Behavior Therapy  [Psychodynamic Therapy] : Psychodynamic Therapy  [de-identified] : Ms. De La Rosa arrived on time for a weekly remote session of individual psychotherapy. 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 She has anxiety about her partner's snf path and we explored the challenge of balancing his needs with hers.  Feelings validated and insight-oriented interventions and DBT skills offered. Ms. De La Rosa ended session in good emotional and behavioral control. [Recommended Frequency of Visits: ____] : Recommended frequency of visits: [unfilled] [Return in ____ week(s)] : Return in [unfilled] week(s) [FreeTextEntry1] : 1. Continue individual psychotherapy with Dr. Card. 2. Continue medication management with Dr. Adams. 3. Continue couple therapy with Mr. Palomino. 4. Treatment plan and IAP to be revised in August 2024.

## 2024-07-30 ENCOUNTER — APPOINTMENT (OUTPATIENT)
Dept: PSYCHIATRY | Facility: CLINIC | Age: 69
End: 2024-07-30
Payer: MEDICARE

## 2024-07-30 PROCEDURE — 90834 PSYTX W PT 45 MINUTES: CPT | Mod: 95

## 2024-07-30 NOTE — PHYSICAL EXAM
[Average] : average [Cooperative] : cooperative [Depressed] : depressed [Anxious] : anxious [Full] : full [Clear] : clear [Linear/Goal Directed] : linear/goal directed [Preoccupations/Ruminations] : preoccupations/ruminations [Depressive] : depressive [None Reported] : none reported [WNL] : within normal limits [de-identified] : hypervigilant/mild paranoid response to news [de-identified] : concerned about memory and attention [de-identified] : psychological eval and neurological eval completed at [facility] in 2019

## 2024-07-30 NOTE — END OF VISIT
[Duration of Psychotherapy Visit (minutes spent in synchronous communication): ____] : Duration of Psychotherapy Visit (minutes spent in synchronous communication): [unfilled] [Individual Psychotherapy for 38-52 minutes] : Individual Psychotherapy for 38 - 52 minutes [Teletherapy Service Provided] : The services provided in this session were delivered via tele-therapy [Licensed Clinician] : Licensed Clinician [FreeTextEntry3] : Home ([place], [place]) [FreeTextEntry5] : Home ([place], [place])
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
## 2024-07-30 NOTE — PLAN
[Dialectical Behavior Therapy] : Dialectical Behavior Therapy  [Psychodynamic Therapy] : Psychodynamic Therapy  [Recommended Frequency of Visits: ____] : Recommended frequency of visits: [unfilled] [Return in ____ week(s)] : Return in [unfilled] week(s) [FreeTextEntry2] : 1. develop coping strategies for depressive and anxious symptoms 2. develop narrative to assist in processing traumatic memories Once weekly psychodynamic treatment (with elements of DBT as necessary) with Dr. Card Couple therapy with Mr. Palomino. Medication management with Dr. Wilhelm  [de-identified] : Ms. De La Rosa arrived on time for a weekly remote session of individual psychotherapy. She explored a positive experience attending a recovery yuly recommended by a colleague. This led to an exploration of the role of validation without the hindrance of Latter-day hierarchy. Feelings validated and insight-oriented interventions and DBT skills offered. Ms. De La Rosa ended session in good emotional and behavioral control. [FreeTextEntry1] : 1. Continue individual psychotherapy with Dr. Card. 2. Continue medication management with Dr. Adams. 3. Continue couple therapy with Mr. Palomino. 4. Treatment plan and IAP to be revised in August 2024.

## 2024-07-31 ENCOUNTER — APPOINTMENT (OUTPATIENT)
Dept: PSYCHIATRY | Facility: CLINIC | Age: 69
End: 2024-07-31
Payer: MEDICARE

## 2024-07-31 DIAGNOSIS — F43.23 ADJUSTMENT DISORDER WITH MIXED ANXIETY AND DEPRESSED MOOD: ICD-10-CM

## 2024-07-31 PROCEDURE — 90847 FAMILY PSYTX W/PT 50 MIN: CPT | Mod: 95

## 2024-08-01 ENCOUNTER — OUTPATIENT (OUTPATIENT)
Dept: OUTPATIENT SERVICES | Facility: HOSPITAL | Age: 69
LOS: 1 days | Discharge: HOME | End: 2024-08-01

## 2024-08-02 NOTE — PLAN
[Emotionally Focused Couples Therapy] : Emotionally Focused Couples Therapy [de-identified] : Patient described feeling bored and lonely in senior living. Patient and  discussed new challenges as they have gotten older and how they can navigate them together. Emotionally focused therapy interventions used throughout the session. Patient and  were engaged and receptive to interventions used and ended the session in good emotional and behavioral control.  [FreeTextEntry1] : Biweekly couples therapy

## 2024-08-06 ENCOUNTER — APPOINTMENT (OUTPATIENT)
Dept: PSYCHIATRY | Facility: CLINIC | Age: 69
End: 2024-08-06

## 2024-08-06 PROCEDURE — 90832 PSYTX W PT 30 MINUTES: CPT | Mod: 95

## 2024-08-06 NOTE — PHYSICAL EXAM
[Average] : average [Cooperative] : cooperative [Depressed] : depressed [Anxious] : anxious [Full] : full [Clear] : clear [Linear/Goal Directed] : linear/goal directed [Preoccupations/Ruminations] : preoccupations/ruminations [Depressive] : depressive [None Reported] : none reported [WNL] : within normal limits [de-identified] : hypervigilant/mild paranoid response to news [de-identified] : concerned about memory and attention [de-identified] : psychological eval and neurological eval completed at [facility] in 2019

## 2024-08-06 NOTE — REASON FOR VISIT
[Continuity of care] : to ensure continuity of care [Telehealth (audio & video) - Individual/Group] : This visit was provided via telehealth using real-time 2-way audio visual technology. [Other Location: e.g. Home (Enter Location, City,State)___] : The provider was located at [unfilled]. [Home] : The patient, [unfilled], was located at home, [unfilled], at the time of the visit. [Verbal consent obtained from patient/other participant(s)] : Verbal consent for telehealth/telephonic services obtained from patient/other participant(s) [FreeTextEntry4] : 4:30 pm [FreeTextEntry5] : 5:00 pm [Patient] : Patient [FreeTextEntry1] : anxiety, sadness, trauma hx

## 2024-08-06 NOTE — PLAN
[FreeTextEntry2] : 1. develop coping strategies for depressive and anxious symptoms 2. develop narrative to assist in processing traumatic memories Once weekly psychodynamic treatment (with elements of DBT as necessary) with Dr. Card Couple therapy with Mr. Palomino. Medication management with Dr. Wilhelm  [Dialectical Behavior Therapy] : Dialectical Behavior Therapy  [Psychodynamic Therapy] : Psychodynamic Therapy  [de-identified] : Ms. De La Rosa arrived on time for a rescheduled weekly remote session of individual psychotherapy. 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 Her relationship with her partner was also explored. Feelings validated and insight-oriented interventions and insight-oriented interventions offered. Ms. De La Rosa ended session in good emotional and behavioral control. [Recommended Frequency of Visits: ____] : Recommended frequency of visits: [unfilled] [Return in ____ week(s)] : Return in [unfilled] week(s) [FreeTextEntry1] : 1. Continue individual psychotherapy with Dr. Card. 2. Continue medication management with Dr. Adams. 3. Continue couple therapy with Mr. Palomino. 4. Treatment plan and IAP to be revised in August 2024.

## 2024-08-06 NOTE — END OF VISIT
[Duration of Psychotherapy Visit (minutes spent in synchronous communication): ____] : Duration of Psychotherapy Visit (minutes spent in synchronous communication): [unfilled] [Individual Psychotherapy for 16-37 minutes] : Individual Psychotherapy for 16-37 minutes [Teletherapy Service Provided] : The services provided in this session were delivered via tele-therapy [FreeTextEntry3] : Home ([place], [place]) [FreeTextEntry5] : Home ([place], [place]) [Licensed Clinician] : Licensed Clinician
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
## 2024-08-13 ENCOUNTER — APPOINTMENT (OUTPATIENT)
Dept: PSYCHIATRY | Facility: CLINIC | Age: 69
End: 2024-08-13

## 2024-08-13 ENCOUNTER — APPOINTMENT (OUTPATIENT)
Dept: PSYCHIATRY | Facility: CLINIC | Age: 69
End: 2024-08-13
Payer: MEDICARE

## 2024-08-13 DIAGNOSIS — F43.23 ADJUSTMENT DISORDER WITH MIXED ANXIETY AND DEPRESSED MOOD: ICD-10-CM

## 2024-08-13 PROCEDURE — 90847 FAMILY PSYTX W/PT 50 MIN: CPT | Mod: 95

## 2024-08-13 NOTE — HISTORY OF PRESENT ILLNESS
[FreeTextEntry1] : Patient is seen and evaluated. Interval history reviewed. 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 She has been struggling with interpersonal relationships at work. She reports increased negative self talking/thinking and mild paranoia at times in response to co-workers and work stress. She ran out of propranolol 2 weeks ago and has been experiencing elevated levels of anxiety since that time. . She denies SI/HI. [Not Applicable] : Not applicable [FreeTextEntry2] : See PPHx [FreeTextEntry3] : See PPHx Abdominal pain

## 2024-08-14 NOTE — PLAN
[Emotionally Focused Couples Therapy] : Emotionally Focused Couples Therapy [de-identified] : Patient described her need to plan things with her . Patient and  explored ways to make it easier for  to participate more fully in planning and satisfying patient's needs. Emotionally focused therapy interventions used throughout the session. Patient and  were engaged and receptive to interventions used and ended the session in good emotional and behavioral control.  [FreeTextEntry1] : Biweekly couples therapy

## 2024-08-20 ENCOUNTER — APPOINTMENT (OUTPATIENT)
Dept: PSYCHIATRY | Facility: CLINIC | Age: 69
End: 2024-08-20

## 2024-08-21 NOTE — END OF VISIT
Chaperone present during sensitive exam:                                                                       ILaureen MA, was present as chaperone during the sensitive exam.   [Duration of Psychotherapy Visit (minutes spent in synchronous communication): ____] : Duration of Psychotherapy Visit (minutes spent in synchronous communication): [unfilled] [Individual Psychotherapy for 38-52 minutes] : Individual Psychotherapy for 38 - 52 minutes [Teletherapy Service Provided] : The services provided in this session were delivered via tele-therapy [FreeTextEntry3] : home [FreeTextEntry5] : PIPER Nicholson

## 2024-08-27 ENCOUNTER — APPOINTMENT (OUTPATIENT)
Dept: PSYCHIATRY | Facility: CLINIC | Age: 69
End: 2024-08-27

## 2024-08-27 ENCOUNTER — NON-APPOINTMENT (OUTPATIENT)
Age: 69
End: 2024-08-27

## 2024-08-27 DIAGNOSIS — F34.1 DYSTHYMIC DISORDER: ICD-10-CM

## 2024-08-27 DIAGNOSIS — F43.23 ADJUSTMENT DISORDER WITH MIXED ANXIETY AND DEPRESSED MOOD: ICD-10-CM

## 2024-08-27 DIAGNOSIS — F60.3 BORDERLINE PERSONALITY DISORDER: ICD-10-CM

## 2024-08-27 DIAGNOSIS — F41.1 GENERALIZED ANXIETY DISORDER: ICD-10-CM

## 2024-09-03 ENCOUNTER — APPOINTMENT (OUTPATIENT)
Dept: PSYCHIATRY | Facility: CLINIC | Age: 69
End: 2024-09-03

## 2024-09-04 ENCOUNTER — APPOINTMENT (OUTPATIENT)
Dept: PSYCHIATRY | Facility: CLINIC | Age: 69
End: 2024-09-04
Payer: MEDICARE

## 2024-09-04 PROCEDURE — 90847 FAMILY PSYTX W/PT 50 MIN: CPT | Mod: 95

## 2024-09-05 NOTE — PLAN
[Emotionally Focused Couples Therapy] : Emotionally Focused Couples Therapy [de-identified] : Patient and  discussed the emotional challenges the patient feels in correction, and what their future may look like. Patient and partner explored whether moving out of the city is an option and what it would take. Emotionally focused therapy interventions used throughout the session. Patient and  were engaged and receptive to interventions used and ended the session in good emotional and behavioral control.  [FreeTextEntry1] : Biweekly couples therapy

## 2024-09-08 ENCOUNTER — NON-APPOINTMENT (OUTPATIENT)
Age: 69
End: 2024-09-08

## 2024-09-09 ENCOUNTER — APPOINTMENT (OUTPATIENT)
Dept: PSYCHIATRY | Facility: CLINIC | Age: 69
End: 2024-09-09
Payer: MEDICARE

## 2024-09-09 PROCEDURE — 90832 PSYTX W PT 30 MINUTES: CPT | Mod: 95

## 2024-09-10 ENCOUNTER — APPOINTMENT (OUTPATIENT)
Dept: PSYCHIATRY | Facility: CLINIC | Age: 69
End: 2024-09-10

## 2024-09-10 NOTE — DISCUSSION/SUMMARY
Duplicate request.    [FreeTextEntry1] : 68 year old , domiciled with family, employed  woman with history of Borderline Personality Disorder, JAMESON, who continues to report affective instability, destabilization, tearfulness, and feelings of low self-worth in the setting of multiple psychosocial stressors.  Time spent: prescribing meds, documentation. Shared decision to reintroduce methylphenidate given perisstent issues with concentration impacting functioning. Patient is aware of risks vs benefits of medications and potential side effects.    Psychotherpay: 16 mins Psychoeducation, supportive counseling, shared decision making. Discussed interpersonal conflicts at work and skills to encourage effective communication.

## 2024-09-11 ENCOUNTER — APPOINTMENT (OUTPATIENT)
Dept: PSYCHIATRY | Facility: CLINIC | Age: 69
End: 2024-09-11
Payer: MEDICARE

## 2024-09-11 DIAGNOSIS — F43.23 ADJUSTMENT DISORDER WITH MIXED ANXIETY AND DEPRESSED MOOD: ICD-10-CM

## 2024-09-11 PROCEDURE — 90847 FAMILY PSYTX W/PT 50 MIN: CPT | Mod: 95

## 2024-09-11 NOTE — PLAN
[Emotionally Focused Couples Therapy] : Emotionally Focused Couples Therapy [de-identified] : Patient discussed how she was feeling after the presidential debate last night. Patient's  discussed how a toxic environment at work has effected him and and explored how he wants to handle his work life in the future. Emotionally focused therapy interventions used throughout the session. Patient and  were engaged and receptive to interventions used and ended the session in good emotional and behavioral control.  [FreeTextEntry1] : Biweekly couples therapy

## 2024-09-18 ENCOUNTER — APPOINTMENT (OUTPATIENT)
Dept: PSYCHIATRY | Facility: CLINIC | Age: 69
End: 2024-09-18
Payer: COMMERCIAL

## 2024-09-18 PROCEDURE — 90834 PSYTX W PT 45 MINUTES: CPT | Mod: 95

## 2024-09-18 NOTE — PHYSICAL EXAM
[Cooperative] : cooperative [Euthymic] : euthymic [Depressed] : depressed [Anxious] : anxious [Full] : full [Clear] : clear [Linear/Goal Directed] : linear/goal directed [Preoccupations/Ruminations] : preoccupations/ruminations [Depressive] : depressive [None Reported] : none reported [Average] : average [WNL] : within normal limits [de-identified] : hypervigilant/mild paranoid response to news [de-identified] : concerned about memory and attention [de-identified] : psychological eval and neurological eval completed at [facility] in 2019
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
## 2024-09-18 NOTE — PHYSICAL EXAM
[Cooperative] : cooperative [Euthymic] : euthymic [Depressed] : depressed [Anxious] : anxious [Full] : full [Clear] : clear [Linear/Goal Directed] : linear/goal directed [Preoccupations/Ruminations] : preoccupations/ruminations [Depressive] : depressive [None Reported] : none reported [Average] : average [WNL] : within normal limits [de-identified] : hypervigilant/mild paranoid response to news [de-identified] : concerned about memory and attention [de-identified] : psychological eval and neurological eval completed at [facility] in 2019
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
## 2024-09-18 NOTE — PHYSICAL EXAM
[Cooperative] : cooperative [Euthymic] : euthymic [Depressed] : depressed [Anxious] : anxious [Full] : full [Clear] : clear [Linear/Goal Directed] : linear/goal directed [Preoccupations/Ruminations] : preoccupations/ruminations [Depressive] : depressive [None Reported] : none reported [Average] : average [WNL] : within normal limits [de-identified] : hypervigilant/mild paranoid response to news [de-identified] : concerned about memory and attention [de-identified] : psychological eval and neurological eval completed at [facility] in 2019
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
## 2024-09-18 NOTE — END OF VISIT
[Duration of Psychotherapy Visit (minutes spent in synchronous communication): ____] : Duration of Psychotherapy Visit (minutes spent in synchronous communication): [unfilled] [Individual Psychotherapy for 16-37 minutes] : Individual Psychotherapy for 16-37 minutes [Family Therapy With Client Present] : Family Therapy With Client Present  [Teletherapy Service Provided] : The services provided in this session were delivered via tele-therapy [After Hours] : After Hours [Licensed Clinician] : Licensed Clinician [FreeTextEntry3] : Home ([place], [place]) [FreeTextEntry5] : Home ([place], [place]) [FreeTextEntry6] :  was present and a participant in couples counseling

## 2024-09-18 NOTE — DISCUSSION/SUMMARY
[Plan Revision] : Plan Revision [Articulate] : articulate [Attempting to realize their potential] : Attempting to realize their potential [Insightful] : insightful [Creative] : creative [Intelligent] : intelligent [Motivated to participate in treatment] : motivated to participate in treatment [Housing stability] : housing stability [Mental Health] : Mental Health [Continued - Progress made] : Continued - Progress made: [every ___ weeks] : every [unfilled] weeks [Improvement in symptoms as evidenced by:] : Improvement in symptoms as evidenced by: [Yes] : Yes [Psychiatric Provider/Prescriber] : Psychiatric Provider/Prescriber [Therapist] : Therapist [Other: ____] : [unfilled] [FreeTextEntry2] : February 2025 [FreeTextEntry3] : 6/19/2008 [FreeTextEntry9] : raised in [employer] family [FreeTextEntry1] : mood stability [FreeTextEntry4] : Explore material around my parents, leaving home at 16 and not forgiving myself, feeling alone since then, easily feeling judged, difficulty adusting to lack of klonopin when upset [de-identified] : increase coping skills for distress tolerance including self-validation [de-identified] : develop narrative to help process significant life experiences [FreeTextEntry5] : individual therapy integrating psychodynamic and DBT approaches medication management consider group therapy for DBT Skills couples therapy with Kenn - going well, James and I have been going through difficult [de-identified] : Dr. Adams [de-identified] : Dr. Card [de-identified] : Couples with Kenn Palomino LCSW  [de-identified] : Decreased frequency of anxiety

## 2024-09-18 NOTE — DISCUSSION/SUMMARY
[Plan Revision] : Plan Revision [Articulate] : articulate [Attempting to realize their potential] : Attempting to realize their potential [Insightful] : insightful [Creative] : creative [Intelligent] : intelligent [Motivated to participate in treatment] : motivated to participate in treatment [Housing stability] : housing stability [Mental Health] : Mental Health [Continued - Progress made] : Continued - Progress made: [every ___ weeks] : every [unfilled] weeks [Improvement in symptoms as evidenced by:] : Improvement in symptoms as evidenced by: [Yes] : Yes [Psychiatric Provider/Prescriber] : Psychiatric Provider/Prescriber [Therapist] : Therapist [Other: ____] : [unfilled] [FreeTextEntry2] : February 2025 [FreeTextEntry3] : 6/19/2008 [FreeTextEntry9] : raised in [employer] family [FreeTextEntry1] : mood stability [FreeTextEntry4] : Explore material around my parents, leaving home at 16 and not forgiving myself, feeling alone since then, easily feeling judged, difficulty adusting to lack of klonopin when upset [de-identified] : increase coping skills for distress tolerance including self-validation [de-identified] : develop narrative to help process significant life experiences [FreeTextEntry5] : individual therapy integrating psychodynamic and DBT approaches medication management consider group therapy for DBT Skills couples therapy with Kenn - going well, James and I have been going through difficult [de-identified] : Dr. Adams [de-identified] : Dr. Card [de-identified] : Couples with Kenn Palomino LCSW  [de-identified] : Decreased frequency of anxiety

## 2024-09-18 NOTE — PLAN
[Dialectical Behavior Therapy] : Dialectical Behavior Therapy  [Psychodynamic Therapy] : Psychodynamic Therapy  [Recommended Frequency of Visits: ____] : Recommended frequency of visits: [unfilled] [Return in ____ week(s)] : Return in [unfilled] week(s) [FreeTextEntry2] : 1. develop coping strategies for depressive and anxious symptoms 2. develop narrative to assist in processing traumatic memories Once weekly psychodynamic treatment (with elements of DBT as necessary) with Dr. Card Couple therapy with Mr. Palomino. Medication management with Dr. Wilhelm  [de-identified] : Ms. De La Rosa arrived on time for a rescheduled weekly remote session of individual psychotherapy. 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 She explores pros/cons of seeking a reference from her former boss for an online care-giving site. She opted in the end to wait and send a note by email. Feelings validated and DBT-oriented and insight-oriented interventions offered. Ms. De La Rosa ended session in good emotional and behavioral control. [FreeTextEntry1] : 1. Continue individual psychotherapy with Dr. Card. 2. Continue medication management with Dr. Adams. 3. Continue couple therapy with Mr. Palomino. 4. Treatment plan and IAP to be revised in September 2024.

## 2024-09-18 NOTE — RISK ASSESSMENT
[Yes] : Yes [Eating disorder] : eating disorder [Alcohol/Substance Use disorders] : alcohol/substance use disorders [Cluster B Personality disorder/traits] : cluster B personality disorder/traits [Depressed mood/Anhedonia] : depressed mood/anhedonia [None known] : None known [Identifies reasons for living] : identifies reasons for living [Supportive social network of family or friends] : supportive social network of family or friends [Positive therapeutic relationships] : positive therapeutic relationships [Responsibility to children, family, or others] : responsibility to children, family, or others [Engaged in work or school] : engaged in work or school [Low acute suicide risk] : Low acute suicide risk [No] : No [Not clinically indicated] : Safety Plan completed/updated (for individuals at risk): Not clinically indicated [FreeTextEntry8] : C-SSRS = 1, passive suicidal ideation but no active ideation, intent or plan [TextBox_32] : Pt endorses passive SI but denies active ideation, plan or intent.

## 2024-09-18 NOTE — END OF VISIT
[Duration of Psychotherapy Visit (minutes spent in synchronous communication): ____] : Duration of Psychotherapy Visit (minutes spent in synchronous communication): [unfilled] [Individual Psychotherapy for 16-37 minutes] : Individual Psychotherapy for 16-37 minutes [Family Therapy With Client Present] : Family Therapy With Client Present  [Teletherapy Service Provided] : The services provided in this session were delivered via tele-therapy [After Hours] : After Hours [Licensed Clinician] : Licensed Clinician [FreeTextEntry3] : Home ([place], [place]) [FreeTextEntry5] : Home ([place], [place]) [FreeTextEntry6] :  was present and a participant in couples counseling

## 2024-09-18 NOTE — REASON FOR VISIT
[Patient preference] : as per patient preference [Continuity of care] : to ensure continuity of care [Telehealth (audio & video) - Individual/Group] : This visit was provided via telehealth using real-time 2-way audio visual technology. [Other Location: e.g. Home (Enter Location, City,State)___] : The provider was located at [unfilled]. [Home] : The patient, [unfilled], was located at home, [unfilled], at the time of the visit. [Spouse] : spouse [Verbal consent obtained from patient/other participant(s)] : Verbal consent for telehealth/telephonic services obtained from patient/other participant(s) [Patient] : Patient [FreeTextEntry4] : 11:00 am [FreeTextEntry5] : 11:45 am [FreeTextEntry1] : anxiety, sadness, trauma hx

## 2024-09-18 NOTE — PLAN
[Dialectical Behavior Therapy] : Dialectical Behavior Therapy  [Psychodynamic Therapy] : Psychodynamic Therapy  [Recommended Frequency of Visits: ____] : Recommended frequency of visits: [unfilled] [Return in ____ week(s)] : Return in [unfilled] week(s) [FreeTextEntry2] : 1. develop coping strategies for depressive and anxious symptoms 2. develop narrative to assist in processing traumatic memories Once weekly psychodynamic treatment (with elements of DBT as necessary) with Dr. Card Couple therapy with Mr. Palomino. Medication management with Dr. Wilhelm  [de-identified] : Ms. De La Rosa arrived on time for a rescheduled weekly remote session of individual psychotherapy. 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 She explores pros/cons of seeking a reference from her former boss for an online care-giving site. She opted in the end to wait and send a note by email. Feelings validated and DBT-oriented and insight-oriented interventions offered. Ms. De La Rosa ended session in good emotional and behavioral control. [FreeTextEntry1] : 1. Continue individual psychotherapy with Dr. Card. 2. Continue medication management with Dr. Adams. 3. Continue couple therapy with Mr. Palomino. 4. Treatment plan and IAP to be revised in September 2024.

## 2024-09-18 NOTE — REASON FOR VISIT
[Patient preference] : as per patient preference [Continuity of care] : to ensure continuity of care [Telehealth (audio & video) - Individual/Group] : This visit was provided via telehealth using real-time 2-way audio visual technology. [Other Location: e.g. Home (Enter Location, City,State)___] : The provider was located at [unfilled]. [Home] : The patient, [unfilled], was located at home, [unfilled], at the time of the visit. [Verbal consent obtained from patient/other participant(s)] : Verbal consent for telehealth/telephonic services obtained from patient/other participant(s) [Patient] : Patient [Spouse] : spouse [FreeTextEntry4] : 12:00 pm [FreeTextEntry5] : 12:30 pm [FreeTextEntry1] : anxiety, sadness, trauma hx

## 2024-09-18 NOTE — PLAN
[Dialectical Behavior Therapy] : Dialectical Behavior Therapy  [Psychodynamic Therapy] : Psychodynamic Therapy  [Recommended Frequency of Visits: ____] : Recommended frequency of visits: [unfilled] [Return in ____ week(s)] : Return in [unfilled] week(s) [FreeTextEntry2] : 1. develop coping strategies for depressive and anxious symptoms 2. develop narrative to assist in processing traumatic memories Once weekly psychodynamic treatment (with elements of DBT as necessary) with Dr. Card Couple therapy with Mr. Palomino. Medication management with Dr. Adams  [de-identified] : Ms. De La Rosa arrived on time for a weekly remote session of individual psychotherapy. She reported starting to go to in-person art classes again, and was "humbled" by feeling her skills had diminished. 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 Feelings validated and DBT-oriented and insight-oriented interventions offered. Ms. De La Rosa ended session in good emotional and behavioral control. [FreeTextEntry1] : 1. Continue individual psychotherapy with Dr. Card. 2. Continue medication management with Dr. Adams. 3. Continue couple therapy with Mr. Palomino. 4. Treatment plan and IAP to be revised in September 2024.

## 2024-09-18 NOTE — DISCUSSION/SUMMARY
[Plan Revision] : Plan Revision [Articulate] : articulate [Attempting to realize their potential] : Attempting to realize their potential [Insightful] : insightful [Creative] : creative [Intelligent] : intelligent [Motivated to participate in treatment] : motivated to participate in treatment [Housing stability] : housing stability [Mental Health] : Mental Health [Continued - Progress made] : Continued - Progress made: [every ___ weeks] : every [unfilled] weeks [Improvement in symptoms as evidenced by:] : Improvement in symptoms as evidenced by: [Yes] : Yes [Psychiatric Provider/Prescriber] : Psychiatric Provider/Prescriber [Therapist] : Therapist [Other: ____] : [unfilled] [FreeTextEntry2] : February 2025 [FreeTextEntry3] : 6/19/2008 [FreeTextEntry9] : raised in Islam [employer] family [FreeTextEntry1] : mood stability [FreeTextEntry4] : Explore material around my parents, leaving home at 16 and not forgiving myself, feeling alone since then, easily feeling judged, difficulty adusting to lack of klonopin when upset [de-identified] : increase coping skills for distress tolerance including self-validation [de-identified] : develop narrative to help process significant life experiences [FreeTextEntry5] : individual therapy integrating psychodynamic and DBT approaches medication management consider group therapy for DBT Skills couples therapy with Kenn - going well, James and I have been going through difficult [de-identified] : Dr. Adams [de-identified] : Dr. Card [de-identified] : Couples with Kenn Palomino LCSW  [de-identified] : Decreased frequency of anxiety

## 2024-09-18 NOTE — END OF VISIT
[Duration of Psychotherapy Visit (minutes spent in synchronous communication): ____] : Duration of Psychotherapy Visit (minutes spent in synchronous communication): [unfilled] [Individual Psychotherapy for 38-52 minutes] : Individual Psychotherapy for 38 - 52 minutes [Teletherapy Service Provided] : The services provided in this session were delivered via tele-therapy [After Hours] : After Hours [Licensed Clinician] : Licensed Clinician [FreeTextEntry3] : Home ([place], [place]) [FreeTextEntry5] : Home ([place], [place])

## 2024-09-25 ENCOUNTER — APPOINTMENT (OUTPATIENT)
Dept: PSYCHIATRY | Facility: CLINIC | Age: 69
End: 2024-09-25
Payer: MEDICARE

## 2024-09-25 PROCEDURE — 90837 PSYTX W PT 60 MINUTES: CPT | Mod: 95

## 2024-09-25 NOTE — PLAN
[Dialectical Behavior Therapy] : Dialectical Behavior Therapy  [Psychodynamic Therapy] : Psychodynamic Therapy  [Recommended Frequency of Visits: ____] : Recommended frequency of visits: [unfilled] [Return in ____ week(s)] : Return in [unfilled] week(s) [FreeTextEntry2] : Goal (In patient's words): "I'd like to be able to forgive myself." and "I've been trying to stabilize [from stopping klonopin] for such a long time.".   Objective A: increase coping skills for distress tolerance including self-validation   Objective B: engage in meaningful experiences with groups and with partner   Intervention(s): individual therapy on weekly basis for 45 minute duration (up to 60 minutes if needed) integrating psychodynamic and DBT approaches medication management every three months couple therapy for 60 minutes on weekly basis   [de-identified] : Ms. De La Rosa arrived on time for a weekly remote session of individual psychotherapy. 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 We also explored her desire for validation from others and how challenging she found it to engage in meaningful self-validation, which she linked to her upbringing. Feelings validated and DBT-oriented and insight-oriented interventions offered. Treatment plan completed. Ms. De La Rosa ended session in good emotional and behavioral control. [FreeTextEntry1] : 1. Continue individual psychotherapy with Dr. Card. 2. Continue medication management with Dr. Adams. 3. Continue couple therapy with Mr. Palomino. 4. Treatment plan and IAP to be revised in September 2024.

## 2024-09-25 NOTE — REASON FOR VISIT
[Patient preference] : as per patient preference [Continuity of care] : to ensure continuity of care [Telehealth (audio & video) - Individual/Group] : This visit was provided via telehealth using real-time 2-way audio visual technology. [Other Location: e.g. Home (Enter Location, City,State)___] : The provider was located at [unfilled]. [Home] : The patient, [unfilled], was located at home, [unfilled], at the time of the visit. [Spouse] : spouse [Verbal consent obtained from patient/other participant(s)] : Verbal consent for telehealth/telephonic services obtained from patient/other participant(s) [Patient] : Patient [FreeTextEntry4] : 11:00 am [FreeTextEntry5] : 11:55 am [FreeTextEntry1] : anxiety, sadness, trauma hx
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
## 2024-09-25 NOTE — PHYSICAL EXAM
[Cooperative] : cooperative [Euthymic] : euthymic [Depressed] : depressed [Anxious] : anxious [Full] : full [Clear] : clear [Linear/Goal Directed] : linear/goal directed [Preoccupations/Ruminations] : preoccupations/ruminations [Depressive] : depressive [None Reported] : none reported [Average] : average [WNL] : within normal limits [de-identified] : hypervigilant/mild paranoid response to news [de-identified] : psychological eval and neurological eval completed at [facility] in 2019 [de-identified] : concerned about memory and attention

## 2024-09-25 NOTE — END OF VISIT
[Duration of Psychotherapy Visit (minutes spent in synchronous communication): ____] : Duration of Psychotherapy Visit (minutes spent in synchronous communication): [unfilled] [Teletherapy Service Provided] : The services provided in this session were delivered via tele-therapy [After Hours] : After Hours [Licensed Clinician] : Licensed Clinician [Individual Psychotherapy for 53+ minutes] : Individual Psychotherapy for 53+ minutes  [FreeTextEntry3] : Home ([place], [place]) [FreeTextEntry5] : Home ([place], [place])

## 2024-10-02 ENCOUNTER — APPOINTMENT (OUTPATIENT)
Dept: PSYCHIATRY | Facility: CLINIC | Age: 69
End: 2024-10-02
Payer: MEDICARE

## 2024-10-02 PROCEDURE — 90834 PSYTX W PT 45 MINUTES: CPT | Mod: 95

## 2024-10-03 NOTE — END OF VISIT
[Teletherapy Service Provided] : The services provided in this session were delivered via tele-therapy [After Hours] : After Hours [Licensed Clinician] : Licensed Clinician [Duration of Psychotherapy Visit (minutes spent in synchronous communication): ____] : Duration of Psychotherapy Visit (minutes spent in synchronous communication): [unfilled] [Individual Psychotherapy for 38-52 minutes] : Individual Psychotherapy for 38 - 52 minutes [FreeTextEntry3] : Home ([place], [place]) [FreeTextEntry5] : Home ([place], [place])

## 2024-10-03 NOTE — PLAN
[Dialectical Behavior Therapy] : Dialectical Behavior Therapy  [Psychodynamic Therapy] : Psychodynamic Therapy  [Recommended Frequency of Visits: ____] : Recommended frequency of visits: [unfilled] [Return in ____ week(s)] : Return in [unfilled] week(s) [FreeTextEntry2] : Goal (In patient's words): "I'd like to be able to forgive myself." and "I've been trying to stabilize [from stopping klonopin] for such a long time.".   Objective A: increase coping skills for distress tolerance including self-validation   Objective B: engage in meaningful experiences with groups and with partner   Intervention(s): individual therapy on weekly basis for 45 minute duration (up to 60 minutes if needed) integrating psychodynamic and DBT approaches medication management every three months couple therapy for 60 minutes on weekly basis   [de-identified] : Ms. De La Rosa arrived on time for a weekly remote session of individual psychotherapy. She explored a desire to be meeting more in person. We discussed the idea of her coming into the clinic for our sessions and decided to try it for our next session. 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 Then she perceived a change in the environment at work, and it may have influenced her decision to retire. Feelings validated and DBT-oriented and insight-oriented interventions offered. Treatment plan completed. Ms. De La Rosa ended session in good emotional and behavioral control. [FreeTextEntry1] : 1. Continue individual psychotherapy with Dr. Card. 2. Continue medication management with Dr. Adams. 3. Continue couple therapy with Mr. Palomino. 4. Treatment plan and IAP to be revised in February 2025.

## 2024-10-03 NOTE — PHYSICAL EXAM
[Cooperative] : cooperative [Euthymic] : euthymic [Depressed] : depressed [Anxious] : anxious [Full] : full [Clear] : clear [Linear/Goal Directed] : linear/goal directed [Preoccupations/Ruminations] : preoccupations/ruminations [Depressive] : depressive [None Reported] : none reported [Average] : average [WNL] : within normal limits [de-identified] : hypervigilant/mild paranoid response to news [de-identified] : concerned about memory and attention [de-identified] : psychological eval and neurological eval completed at [facility] in 2019
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
## 2024-10-09 ENCOUNTER — APPOINTMENT (OUTPATIENT)
Dept: PSYCHIATRY | Facility: CLINIC | Age: 69
End: 2024-10-09
Payer: MEDICARE

## 2024-10-09 ENCOUNTER — APPOINTMENT (OUTPATIENT)
Dept: PSYCHIATRY | Facility: CLINIC | Age: 69
End: 2024-10-09

## 2024-10-09 DIAGNOSIS — R41.840 ATTENTION AND CONCENTRATION DEFICIT: ICD-10-CM

## 2024-10-09 PROCEDURE — 99214 OFFICE O/P EST MOD 30 MIN: CPT | Mod: 95

## 2024-10-09 PROCEDURE — 90833 PSYTX W PT W E/M 30 MIN: CPT | Mod: 95

## 2024-10-09 PROCEDURE — G2211 COMPLEX E/M VISIT ADD ON: CPT | Mod: 95

## 2024-10-09 PROCEDURE — 90834 PSYTX W PT 45 MINUTES: CPT | Mod: 95

## 2024-10-16 ENCOUNTER — APPOINTMENT (OUTPATIENT)
Dept: PSYCHIATRY | Facility: CLINIC | Age: 69
End: 2024-10-16
Payer: MEDICARE

## 2024-10-16 DIAGNOSIS — F43.23 ADJUSTMENT DISORDER WITH MIXED ANXIETY AND DEPRESSED MOOD: ICD-10-CM

## 2024-10-16 PROCEDURE — 90847 FAMILY PSYTX W/PT 50 MIN: CPT | Mod: 95

## 2024-10-16 PROCEDURE — 90834 PSYTX W PT 45 MINUTES: CPT | Mod: 95

## 2024-10-21 ENCOUNTER — APPOINTMENT (OUTPATIENT)
Dept: PSYCHIATRY | Facility: CLINIC | Age: 69
End: 2024-10-21
Payer: MEDICARE

## 2024-10-21 PROCEDURE — 90834 PSYTX W PT 45 MINUTES: CPT | Mod: 95

## 2024-10-30 ENCOUNTER — APPOINTMENT (OUTPATIENT)
Dept: PSYCHIATRY | Facility: CLINIC | Age: 69
End: 2024-10-30
Payer: MEDICARE

## 2024-10-30 ENCOUNTER — NON-APPOINTMENT (OUTPATIENT)
Age: 69
End: 2024-10-30

## 2024-10-30 DIAGNOSIS — F43.23 ADJUSTMENT DISORDER WITH MIXED ANXIETY AND DEPRESSED MOOD: ICD-10-CM

## 2024-10-30 PROCEDURE — 90834 PSYTX W PT 45 MINUTES: CPT | Mod: 95

## 2024-10-30 PROCEDURE — 90847 FAMILY PSYTX W/PT 50 MIN: CPT | Mod: 95

## 2024-11-06 ENCOUNTER — APPOINTMENT (OUTPATIENT)
Dept: PSYCHIATRY | Facility: CLINIC | Age: 69
End: 2024-11-06
Payer: MEDICARE

## 2024-11-06 PROCEDURE — 90834 PSYTX W PT 45 MINUTES: CPT | Mod: 95

## 2024-11-06 PROCEDURE — G2211 COMPLEX E/M VISIT ADD ON: CPT | Mod: 95

## 2024-11-06 PROCEDURE — 90833 PSYTX W PT W E/M 30 MIN: CPT | Mod: 95

## 2024-11-06 PROCEDURE — 99214 OFFICE O/P EST MOD 30 MIN: CPT | Mod: 95

## 2024-11-13 ENCOUNTER — NON-APPOINTMENT (OUTPATIENT)
Age: 69
End: 2024-11-13

## 2024-11-13 ENCOUNTER — APPOINTMENT (OUTPATIENT)
Dept: PSYCHIATRY | Facility: CLINIC | Age: 69
End: 2024-11-13
Payer: MEDICARE

## 2024-11-13 DIAGNOSIS — F43.23 ADJUSTMENT DISORDER WITH MIXED ANXIETY AND DEPRESSED MOOD: ICD-10-CM

## 2024-11-13 PROCEDURE — 90847 FAMILY PSYTX W/PT 50 MIN: CPT | Mod: 95

## 2024-11-13 PROCEDURE — 90834 PSYTX W PT 45 MINUTES: CPT | Mod: 95

## 2024-11-20 ENCOUNTER — APPOINTMENT (OUTPATIENT)
Dept: PSYCHIATRY | Facility: CLINIC | Age: 69
End: 2024-11-20
Payer: MEDICARE

## 2024-11-20 DIAGNOSIS — F41.1 GENERALIZED ANXIETY DISORDER: ICD-10-CM

## 2024-11-20 DIAGNOSIS — F60.3 BORDERLINE PERSONALITY DISORDER: ICD-10-CM

## 2024-11-20 DIAGNOSIS — F34.1 DYSTHYMIC DISORDER: ICD-10-CM

## 2024-11-20 PROCEDURE — 90834 PSYTX W PT 45 MINUTES: CPT | Mod: 95

## 2024-11-27 ENCOUNTER — APPOINTMENT (OUTPATIENT)
Dept: PSYCHIATRY | Facility: CLINIC | Age: 69
End: 2024-11-27

## 2024-12-04 ENCOUNTER — APPOINTMENT (OUTPATIENT)
Dept: PSYCHIATRY | Facility: CLINIC | Age: 69
End: 2024-12-04

## 2024-12-04 ENCOUNTER — APPOINTMENT (OUTPATIENT)
Dept: PSYCHIATRY | Facility: CLINIC | Age: 69
End: 2024-12-04
Payer: MEDICARE

## 2024-12-04 PROCEDURE — 90834 PSYTX W PT 45 MINUTES: CPT | Mod: 95

## 2024-12-11 ENCOUNTER — APPOINTMENT (OUTPATIENT)
Dept: PSYCHIATRY | Facility: CLINIC | Age: 69
End: 2024-12-11
Payer: MEDICARE

## 2024-12-11 PROCEDURE — 90834 PSYTX W PT 45 MINUTES: CPT | Mod: 95

## 2024-12-18 ENCOUNTER — APPOINTMENT (OUTPATIENT)
Dept: PSYCHIATRY | Facility: CLINIC | Age: 69
End: 2024-12-18
Payer: MEDICARE

## 2024-12-18 DIAGNOSIS — F60.3 BORDERLINE PERSONALITY DISORDER: ICD-10-CM

## 2024-12-18 DIAGNOSIS — F43.23 ADJUSTMENT DISORDER WITH MIXED ANXIETY AND DEPRESSED MOOD: ICD-10-CM

## 2024-12-18 DIAGNOSIS — F34.1 DYSTHYMIC DISORDER: ICD-10-CM

## 2024-12-18 DIAGNOSIS — F41.1 GENERALIZED ANXIETY DISORDER: ICD-10-CM

## 2024-12-18 PROCEDURE — 90834 PSYTX W PT 45 MINUTES: CPT | Mod: 95

## 2024-12-18 PROCEDURE — 99214 OFFICE O/P EST MOD 30 MIN: CPT | Mod: 95

## 2024-12-18 PROCEDURE — G2211 COMPLEX E/M VISIT ADD ON: CPT | Mod: 95

## 2024-12-18 PROCEDURE — 90833 PSYTX W PT W E/M 30 MIN: CPT | Mod: 95

## 2024-12-24 ENCOUNTER — APPOINTMENT (OUTPATIENT)
Dept: PSYCHIATRY | Facility: CLINIC | Age: 69
End: 2024-12-24

## 2024-12-31 ENCOUNTER — APPOINTMENT (OUTPATIENT)
Dept: PSYCHIATRY | Facility: CLINIC | Age: 69
End: 2024-12-31

## 2025-01-17 ENCOUNTER — NON-APPOINTMENT (OUTPATIENT)
Age: 70
End: 2025-01-17

## 2025-01-22 ENCOUNTER — APPOINTMENT (OUTPATIENT)
Dept: PSYCHIATRY | Facility: CLINIC | Age: 70
End: 2025-01-22
Payer: MEDICARE

## 2025-01-22 DIAGNOSIS — F60.3 BORDERLINE PERSONALITY DISORDER: ICD-10-CM

## 2025-01-22 PROCEDURE — 90833 PSYTX W PT W E/M 30 MIN: CPT | Mod: 95

## 2025-01-22 PROCEDURE — G2211 COMPLEX E/M VISIT ADD ON: CPT | Mod: 95

## 2025-01-22 PROCEDURE — 99214 OFFICE O/P EST MOD 30 MIN: CPT | Mod: 95

## 2025-01-22 PROCEDURE — 90834 PSYTX W PT 45 MINUTES: CPT | Mod: 95

## 2025-01-29 ENCOUNTER — APPOINTMENT (OUTPATIENT)
Dept: PSYCHIATRY | Facility: CLINIC | Age: 70
End: 2025-01-29
Payer: MEDICARE

## 2025-01-29 ENCOUNTER — NON-APPOINTMENT (OUTPATIENT)
Age: 70
End: 2025-01-29

## 2025-01-29 PROCEDURE — 90834 PSYTX W PT 45 MINUTES: CPT | Mod: 95

## 2025-02-04 ENCOUNTER — APPOINTMENT (OUTPATIENT)
Dept: PSYCHIATRY | Facility: CLINIC | Age: 70
End: 2025-02-04
Payer: MEDICARE

## 2025-02-04 DIAGNOSIS — F43.23 ADJUSTMENT DISORDER WITH MIXED ANXIETY AND DEPRESSED MOOD: ICD-10-CM

## 2025-02-04 PROCEDURE — 90834 PSYTX W PT 45 MINUTES: CPT | Mod: 95

## 2025-02-11 ENCOUNTER — APPOINTMENT (OUTPATIENT)
Dept: PSYCHIATRY | Facility: CLINIC | Age: 70
End: 2025-02-11
Payer: MEDICARE

## 2025-02-11 PROCEDURE — 90834 PSYTX W PT 45 MINUTES: CPT | Mod: 95

## 2025-02-21 ENCOUNTER — APPOINTMENT (OUTPATIENT)
Dept: PSYCHIATRY | Facility: CLINIC | Age: 70
End: 2025-02-21
Payer: MEDICARE

## 2025-02-21 DIAGNOSIS — F41.1 GENERALIZED ANXIETY DISORDER: ICD-10-CM

## 2025-02-21 PROCEDURE — 90834 PSYTX W PT 45 MINUTES: CPT | Mod: 95

## 2025-02-24 ENCOUNTER — NON-APPOINTMENT (OUTPATIENT)
Age: 70
End: 2025-02-24

## 2025-02-24 ENCOUNTER — APPOINTMENT (OUTPATIENT)
Dept: PSYCHIATRY | Facility: CLINIC | Age: 70
End: 2025-02-24

## 2025-02-27 ENCOUNTER — APPOINTMENT (OUTPATIENT)
Dept: PSYCHIATRY | Facility: CLINIC | Age: 70
End: 2025-02-27
Payer: MEDICARE

## 2025-02-27 PROCEDURE — 90834 PSYTX W PT 45 MINUTES: CPT | Mod: 95

## 2025-03-06 ENCOUNTER — NON-APPOINTMENT (OUTPATIENT)
Age: 70
End: 2025-03-06

## 2025-03-07 ENCOUNTER — APPOINTMENT (OUTPATIENT)
Dept: PSYCHIATRY | Facility: CLINIC | Age: 70
End: 2025-03-07
Payer: MEDICARE

## 2025-03-07 DIAGNOSIS — F60.3 BORDERLINE PERSONALITY DISORDER: ICD-10-CM

## 2025-03-07 PROCEDURE — 90834 PSYTX W PT 45 MINUTES: CPT | Mod: 95

## 2025-03-14 ENCOUNTER — APPOINTMENT (OUTPATIENT)
Dept: PSYCHIATRY | Facility: CLINIC | Age: 70
End: 2025-03-14
Payer: MEDICARE

## 2025-03-14 PROCEDURE — 90834 PSYTX W PT 45 MINUTES: CPT | Mod: 95

## 2025-03-21 ENCOUNTER — APPOINTMENT (OUTPATIENT)
Dept: PSYCHIATRY | Facility: CLINIC | Age: 70
End: 2025-03-21
Payer: MEDICARE

## 2025-03-21 DIAGNOSIS — F60.3 BORDERLINE PERSONALITY DISORDER: ICD-10-CM

## 2025-03-21 PROCEDURE — 90834 PSYTX W PT 45 MINUTES: CPT | Mod: 95

## 2025-03-28 ENCOUNTER — APPOINTMENT (OUTPATIENT)
Dept: PSYCHIATRY | Facility: CLINIC | Age: 70
End: 2025-03-28
Payer: MEDICARE

## 2025-03-28 DIAGNOSIS — F43.23 ADJUSTMENT DISORDER WITH MIXED ANXIETY AND DEPRESSED MOOD: ICD-10-CM

## 2025-03-28 PROCEDURE — 90834 PSYTX W PT 45 MINUTES: CPT | Mod: 95

## 2025-04-01 ENCOUNTER — APPOINTMENT (OUTPATIENT)
Dept: PSYCHIATRY | Facility: CLINIC | Age: 70
End: 2025-04-01
Payer: MEDICARE

## 2025-04-01 DIAGNOSIS — F34.1 DYSTHYMIC DISORDER: ICD-10-CM

## 2025-04-01 DIAGNOSIS — F41.1 GENERALIZED ANXIETY DISORDER: ICD-10-CM

## 2025-04-01 PROCEDURE — 90833 PSYTX W PT W E/M 30 MIN: CPT | Mod: 95

## 2025-04-01 PROCEDURE — G2211 COMPLEX E/M VISIT ADD ON: CPT | Mod: 95

## 2025-04-01 PROCEDURE — 99214 OFFICE O/P EST MOD 30 MIN: CPT | Mod: 95

## 2025-04-04 ENCOUNTER — APPOINTMENT (OUTPATIENT)
Dept: PSYCHIATRY | Facility: CLINIC | Age: 70
End: 2025-04-04

## 2025-04-07 ENCOUNTER — APPOINTMENT (OUTPATIENT)
Dept: PSYCHIATRY | Facility: CLINIC | Age: 70
End: 2025-04-07
Payer: MEDICARE

## 2025-04-07 PROCEDURE — 90834 PSYTX W PT 45 MINUTES: CPT | Mod: 95

## 2025-04-11 ENCOUNTER — APPOINTMENT (OUTPATIENT)
Dept: PSYCHIATRY | Facility: CLINIC | Age: 70
End: 2025-04-11
Payer: MEDICARE

## 2025-04-11 PROCEDURE — 90834 PSYTX W PT 45 MINUTES: CPT | Mod: 95

## 2025-04-16 ENCOUNTER — APPOINTMENT (OUTPATIENT)
Dept: PSYCHIATRY | Facility: CLINIC | Age: 70
End: 2025-04-16
Payer: MEDICARE

## 2025-04-16 DIAGNOSIS — F43.23 ADJUSTMENT DISORDER WITH MIXED ANXIETY AND DEPRESSED MOOD: ICD-10-CM

## 2025-04-16 DIAGNOSIS — F60.3 BORDERLINE PERSONALITY DISORDER: ICD-10-CM

## 2025-04-16 PROCEDURE — 90834 PSYTX W PT 45 MINUTES: CPT | Mod: 95

## 2025-04-18 ENCOUNTER — APPOINTMENT (OUTPATIENT)
Dept: PSYCHIATRY | Facility: CLINIC | Age: 70
End: 2025-04-18

## 2025-04-24 ENCOUNTER — APPOINTMENT (OUTPATIENT)
Dept: PSYCHIATRY | Facility: CLINIC | Age: 70
End: 2025-04-24

## 2025-05-02 ENCOUNTER — APPOINTMENT (OUTPATIENT)
Dept: PSYCHIATRY | Facility: CLINIC | Age: 70
End: 2025-05-02
Payer: MEDICARE

## 2025-05-02 PROCEDURE — 90834 PSYTX W PT 45 MINUTES: CPT | Mod: 95

## 2025-05-07 ENCOUNTER — APPOINTMENT (OUTPATIENT)
Dept: PSYCHIATRY | Facility: CLINIC | Age: 70
End: 2025-05-07
Payer: MEDICARE

## 2025-05-07 PROCEDURE — 99214 OFFICE O/P EST MOD 30 MIN: CPT | Mod: 95

## 2025-05-07 PROCEDURE — G2211 COMPLEX E/M VISIT ADD ON: CPT | Mod: 95

## 2025-05-07 PROCEDURE — 90833 PSYTX W PT W E/M 30 MIN: CPT | Mod: 95

## 2025-05-07 RX ORDER — BUPROPION HYDROCHLORIDE 150 MG/1
150 TABLET, EXTENDED RELEASE ORAL
Qty: 30 | Refills: 0 | Status: ACTIVE | COMMUNITY
Start: 2025-05-07 | End: 1900-01-01

## 2025-05-09 ENCOUNTER — APPOINTMENT (OUTPATIENT)
Dept: PSYCHIATRY | Facility: CLINIC | Age: 70
End: 2025-05-09
Payer: MEDICARE

## 2025-05-09 DIAGNOSIS — F43.23 ADJUSTMENT DISORDER WITH MIXED ANXIETY AND DEPRESSED MOOD: ICD-10-CM

## 2025-05-09 PROCEDURE — 90834 PSYTX W PT 45 MINUTES: CPT | Mod: 95

## 2025-05-12 ENCOUNTER — APPOINTMENT (OUTPATIENT)
Dept: PSYCHIATRY | Facility: CLINIC | Age: 70
End: 2025-05-12

## 2025-05-16 ENCOUNTER — APPOINTMENT (OUTPATIENT)
Dept: PSYCHIATRY | Facility: CLINIC | Age: 70
End: 2025-05-16
Payer: MEDICARE

## 2025-05-16 PROCEDURE — 90834 PSYTX W PT 45 MINUTES: CPT | Mod: 95

## 2025-05-19 ENCOUNTER — APPOINTMENT (OUTPATIENT)
Dept: PSYCHIATRY | Facility: CLINIC | Age: 70
End: 2025-05-19

## 2025-05-23 ENCOUNTER — APPOINTMENT (OUTPATIENT)
Dept: PSYCHIATRY | Facility: CLINIC | Age: 70
End: 2025-05-23
Payer: MEDICARE

## 2025-05-23 PROCEDURE — 90834 PSYTX W PT 45 MINUTES: CPT | Mod: 95

## 2025-05-30 ENCOUNTER — APPOINTMENT (OUTPATIENT)
Dept: PSYCHIATRY | Facility: CLINIC | Age: 70
End: 2025-05-30
Payer: MEDICARE

## 2025-05-30 PROCEDURE — 90834 PSYTX W PT 45 MINUTES: CPT | Mod: 95

## 2025-06-02 ENCOUNTER — APPOINTMENT (OUTPATIENT)
Dept: PSYCHIATRY | Facility: CLINIC | Age: 70
End: 2025-06-02

## 2025-06-03 ENCOUNTER — APPOINTMENT (OUTPATIENT)
Dept: PSYCHIATRY | Facility: CLINIC | Age: 70
End: 2025-06-03

## 2025-06-03 ENCOUNTER — NON-APPOINTMENT (OUTPATIENT)
Age: 70
End: 2025-06-03

## 2025-06-04 ENCOUNTER — APPOINTMENT (OUTPATIENT)
Dept: PSYCHIATRY | Facility: CLINIC | Age: 70
End: 2025-06-04
Payer: MEDICARE

## 2025-06-04 PROCEDURE — 99214 OFFICE O/P EST MOD 30 MIN: CPT | Mod: 95

## 2025-06-04 PROCEDURE — G2211 COMPLEX E/M VISIT ADD ON: CPT | Mod: 95

## 2025-06-04 PROCEDURE — 90833 PSYTX W PT W E/M 30 MIN: CPT | Mod: 95

## 2025-06-09 ENCOUNTER — APPOINTMENT (OUTPATIENT)
Dept: PSYCHIATRY | Facility: CLINIC | Age: 70
End: 2025-06-09

## 2025-06-13 ENCOUNTER — APPOINTMENT (OUTPATIENT)
Dept: PSYCHIATRY | Facility: CLINIC | Age: 70
End: 2025-06-13
Payer: MEDICARE

## 2025-06-13 PROCEDURE — 90832 PSYTX W PT 30 MINUTES: CPT | Mod: 95

## 2025-06-16 ENCOUNTER — APPOINTMENT (OUTPATIENT)
Dept: PSYCHIATRY | Facility: CLINIC | Age: 70
End: 2025-06-16

## 2025-06-20 ENCOUNTER — APPOINTMENT (OUTPATIENT)
Dept: PSYCHIATRY | Facility: CLINIC | Age: 70
End: 2025-06-20
Payer: MEDICARE

## 2025-06-20 PROCEDURE — 90834 PSYTX W PT 45 MINUTES: CPT | Mod: 95

## 2025-06-23 ENCOUNTER — APPOINTMENT (OUTPATIENT)
Dept: PSYCHIATRY | Facility: CLINIC | Age: 70
End: 2025-06-23

## 2025-06-27 ENCOUNTER — APPOINTMENT (OUTPATIENT)
Dept: PSYCHIATRY | Facility: CLINIC | Age: 70
End: 2025-06-27
Payer: MEDICARE

## 2025-06-27 PROCEDURE — 90834 PSYTX W PT 45 MINUTES: CPT | Mod: 95

## 2025-06-30 ENCOUNTER — APPOINTMENT (OUTPATIENT)
Dept: PSYCHIATRY | Facility: CLINIC | Age: 70
End: 2025-06-30

## 2025-07-02 ENCOUNTER — NON-APPOINTMENT (OUTPATIENT)
Age: 70
End: 2025-07-02

## 2025-07-02 ENCOUNTER — APPOINTMENT (OUTPATIENT)
Dept: PSYCHIATRY | Facility: CLINIC | Age: 70
End: 2025-07-02
Payer: MEDICARE

## 2025-07-02 PROCEDURE — 90834 PSYTX W PT 45 MINUTES: CPT | Mod: 95

## 2025-07-02 PROCEDURE — G2211 COMPLEX E/M VISIT ADD ON: CPT | Mod: 95

## 2025-07-02 PROCEDURE — 90833 PSYTX W PT W E/M 30 MIN: CPT | Mod: 95

## 2025-07-02 PROCEDURE — 99214 OFFICE O/P EST MOD 30 MIN: CPT | Mod: 95

## 2025-07-07 ENCOUNTER — APPOINTMENT (OUTPATIENT)
Dept: PSYCHIATRY | Facility: CLINIC | Age: 70
End: 2025-07-07

## 2025-07-11 ENCOUNTER — APPOINTMENT (OUTPATIENT)
Dept: PSYCHIATRY | Facility: CLINIC | Age: 70
End: 2025-07-11
Payer: MEDICARE

## 2025-07-11 PROCEDURE — 90834 PSYTX W PT 45 MINUTES: CPT | Mod: 95

## 2025-07-14 ENCOUNTER — APPOINTMENT (OUTPATIENT)
Dept: PSYCHIATRY | Facility: CLINIC | Age: 70
End: 2025-07-14

## 2025-07-18 ENCOUNTER — APPOINTMENT (OUTPATIENT)
Dept: PSYCHIATRY | Facility: CLINIC | Age: 70
End: 2025-07-18
Payer: MEDICARE

## 2025-07-18 PROCEDURE — 90834 PSYTX W PT 45 MINUTES: CPT | Mod: 95

## 2025-07-21 ENCOUNTER — APPOINTMENT (OUTPATIENT)
Dept: PSYCHIATRY | Facility: CLINIC | Age: 70
End: 2025-07-21

## 2025-07-25 ENCOUNTER — APPOINTMENT (OUTPATIENT)
Dept: PSYCHIATRY | Facility: CLINIC | Age: 70
End: 2025-07-25
Payer: MEDICARE

## 2025-07-25 PROCEDURE — 90834 PSYTX W PT 45 MINUTES: CPT | Mod: 95

## 2025-07-28 ENCOUNTER — APPOINTMENT (OUTPATIENT)
Dept: PSYCHIATRY | Facility: CLINIC | Age: 70
End: 2025-07-28

## 2025-08-01 ENCOUNTER — APPOINTMENT (OUTPATIENT)
Dept: PSYCHIATRY | Facility: CLINIC | Age: 70
End: 2025-08-01
Payer: MEDICARE

## 2025-08-01 PROCEDURE — 90834 PSYTX W PT 45 MINUTES: CPT | Mod: 95

## 2025-08-04 ENCOUNTER — APPOINTMENT (OUTPATIENT)
Dept: PSYCHIATRY | Facility: CLINIC | Age: 70
End: 2025-08-04

## 2025-08-08 ENCOUNTER — APPOINTMENT (OUTPATIENT)
Dept: PSYCHIATRY | Facility: CLINIC | Age: 70
End: 2025-08-08
Payer: MEDICARE

## 2025-08-08 PROCEDURE — 90834 PSYTX W PT 45 MINUTES: CPT | Mod: 95

## 2025-08-11 ENCOUNTER — APPOINTMENT (OUTPATIENT)
Dept: PSYCHIATRY | Facility: CLINIC | Age: 70
End: 2025-08-11

## 2025-08-13 ENCOUNTER — APPOINTMENT (OUTPATIENT)
Dept: PSYCHIATRY | Facility: CLINIC | Age: 70
End: 2025-08-13
Payer: MEDICARE

## 2025-08-13 DIAGNOSIS — F60.3 BORDERLINE PERSONALITY DISORDER: ICD-10-CM

## 2025-08-13 DIAGNOSIS — R41.840 ATTENTION AND CONCENTRATION DEFICIT: ICD-10-CM

## 2025-08-13 DIAGNOSIS — F34.1 DYSTHYMIC DISORDER: ICD-10-CM

## 2025-08-13 DIAGNOSIS — F41.1 GENERALIZED ANXIETY DISORDER: ICD-10-CM

## 2025-08-13 PROCEDURE — 99214 OFFICE O/P EST MOD 30 MIN: CPT | Mod: 95

## 2025-08-13 PROCEDURE — G2211 COMPLEX E/M VISIT ADD ON: CPT | Mod: 95

## 2025-08-13 PROCEDURE — 90833 PSYTX W PT W E/M 30 MIN: CPT | Mod: 95

## 2025-08-15 ENCOUNTER — APPOINTMENT (OUTPATIENT)
Dept: PSYCHIATRY | Facility: CLINIC | Age: 70
End: 2025-08-15

## 2025-08-15 ENCOUNTER — NON-APPOINTMENT (OUTPATIENT)
Age: 70
End: 2025-08-15

## 2025-08-18 ENCOUNTER — APPOINTMENT (OUTPATIENT)
Dept: PSYCHIATRY | Facility: CLINIC | Age: 70
End: 2025-08-18

## 2025-08-22 ENCOUNTER — APPOINTMENT (OUTPATIENT)
Dept: PSYCHIATRY | Facility: CLINIC | Age: 70
End: 2025-08-22
Payer: MEDICARE

## 2025-08-22 PROCEDURE — 90834 PSYTX W PT 45 MINUTES: CPT | Mod: 95

## 2025-08-25 ENCOUNTER — APPOINTMENT (OUTPATIENT)
Dept: PSYCHIATRY | Facility: CLINIC | Age: 70
End: 2025-08-25

## 2025-08-27 ENCOUNTER — APPOINTMENT (OUTPATIENT)
Dept: PSYCHIATRY | Facility: CLINIC | Age: 70
End: 2025-08-27
Payer: MEDICARE

## 2025-08-27 PROCEDURE — 90834 PSYTX W PT 45 MINUTES: CPT | Mod: 95

## 2025-09-05 ENCOUNTER — APPOINTMENT (OUTPATIENT)
Dept: PSYCHIATRY | Facility: CLINIC | Age: 70
End: 2025-09-05
Payer: MEDICARE

## 2025-09-05 DIAGNOSIS — F34.1 DYSTHYMIC DISORDER: ICD-10-CM

## 2025-09-05 DIAGNOSIS — F60.3 BORDERLINE PERSONALITY DISORDER: ICD-10-CM

## 2025-09-05 DIAGNOSIS — F41.1 GENERALIZED ANXIETY DISORDER: ICD-10-CM

## 2025-09-05 PROCEDURE — 90834 PSYTX W PT 45 MINUTES: CPT | Mod: 95

## 2025-09-08 ENCOUNTER — APPOINTMENT (OUTPATIENT)
Dept: PSYCHIATRY | Facility: CLINIC | Age: 70
End: 2025-09-08

## 2025-09-12 ENCOUNTER — APPOINTMENT (OUTPATIENT)
Dept: PSYCHIATRY | Facility: CLINIC | Age: 70
End: 2025-09-12
Payer: MEDICARE

## 2025-09-12 DIAGNOSIS — F41.1 GENERALIZED ANXIETY DISORDER: ICD-10-CM

## 2025-09-12 DIAGNOSIS — F34.1 DYSTHYMIC DISORDER: ICD-10-CM

## 2025-09-12 DIAGNOSIS — F60.3 BORDERLINE PERSONALITY DISORDER: ICD-10-CM

## 2025-09-12 PROCEDURE — 90834 PSYTX W PT 45 MINUTES: CPT | Mod: 95

## 2025-09-15 ENCOUNTER — APPOINTMENT (OUTPATIENT)
Dept: PSYCHIATRY | Facility: CLINIC | Age: 70
End: 2025-09-15

## 2025-09-19 ENCOUNTER — APPOINTMENT (OUTPATIENT)
Dept: PSYCHIATRY | Facility: CLINIC | Age: 70
End: 2025-09-19